# Patient Record
Sex: FEMALE | Race: BLACK OR AFRICAN AMERICAN | NOT HISPANIC OR LATINO | Employment: FULL TIME | URBAN - METROPOLITAN AREA
[De-identification: names, ages, dates, MRNs, and addresses within clinical notes are randomized per-mention and may not be internally consistent; named-entity substitution may affect disease eponyms.]

---

## 2017-11-08 ENCOUNTER — ALLSCRIPTS OFFICE VISIT (OUTPATIENT)
Dept: OTHER | Facility: OTHER | Age: 37
End: 2017-11-08

## 2017-11-08 ENCOUNTER — APPOINTMENT (OUTPATIENT)
Dept: LAB | Facility: CLINIC | Age: 37
End: 2017-11-08
Payer: COMMERCIAL

## 2017-11-08 DIAGNOSIS — Z34.90 ENCOUNTER FOR SUPERVISION OF NORMAL PREGNANCY: ICD-10-CM

## 2017-11-08 LAB — B-HCG SERPL-ACNC: 2156 MIU/ML

## 2017-11-08 PROCEDURE — 84702 CHORIONIC GONADOTROPIN TEST: CPT

## 2017-11-08 PROCEDURE — 36415 COLL VENOUS BLD VENIPUNCTURE: CPT

## 2017-11-09 ENCOUNTER — GENERIC CONVERSION - ENCOUNTER (OUTPATIENT)
Dept: OTHER | Facility: OTHER | Age: 37
End: 2017-11-09

## 2017-11-10 NOTE — PROCEDURES
Assessment    1  Pregnancy, location unknown (V22 2) (Z34 90)    Plan  FamHx: Family history of breast cancer    · Genetics Counselor Referral Co-Management  Genetic Counselor  Consult:Breast Cancer  Status: Hold For - Scheduling,Retrospective By Protocol Authorization Requested for: [id number]   Ordered; For: FamHx: Family history of breast cancer; Ordered By: Davis Pearce Performed:  Due: [id number]; Last Updated By: Nohemy Bundy; 11/8/2017 4:43:26 PM  Care Summary provided  : Yes  Pregnancy, location unknown    · (1) HCG QUANT; Status:Active; Requested ZXZ:[id number];    Perform:[facility]; VCR:[id number]; Ordered;location unknown; Ordered By:Emelina Mercedes;    Discussion/Summary  Discussion Summary: This patient had her  It at the end of August or possibly in the middle of September  she has a history of ectopic pregnancy with left salpingectomy  at todays examination by ultrasound no intrauterine pregnancy was noted  plan is to check an HCG level next patient is aware that there is a possibility of an ectopic pregnancy  Active Problems  1  Migraine (346 90) (G43 909)   2  Pregnancy, location unknown (V22 2) (Z34 90)    Current Meds    1  Dulera 100-5 MCG/ACT Inhalation Aerosol; Therapy: (Recorded:08Nov2017) to Recorded   2  Promethazine-Codeine 6 25-10 MG/5ML Oral Syrup; Therapy: (Recorded:08Nov2017) to Recorded   3  Symbicort 80-4 5 MCG/ACT Inhalation Aerosol; Therapy: (Recorded:08Nov2017) to Recorded    Allergies    1  No Known Drug Allergies   2  No Known Drug Allergies    Results/Data  [id number] Transvaginal Ultrasound OB [facility]:  Procedure: [id number] pregnant uterus real time with image documentation, fetal and maternal evaluation, first trimester (<14 weeks 0 days), transvaginal approach: single or first gestation  -- The study was done today in the office  Indication: EDC gestational age 9 3/8 weeks  Exam indication: amenorrhea     Findings:  Number of gestational sacs and fetuses: no sac  Gestational sac/fetal measurements appropriate for gestation: no sac  Exam of maternal uterus and adnexa: prominent endometrial stripe  Impression: unknown location of pregnancy        Signatures   Electronically signed by : JOVANNY Mayberry ; Nov 8 2017  4:47PM EST                       (Author)

## 2017-11-11 ENCOUNTER — APPOINTMENT (OUTPATIENT)
Dept: LAB | Facility: CLINIC | Age: 37
End: 2017-11-11
Payer: COMMERCIAL

## 2017-11-11 DIAGNOSIS — Z34.90 ENCOUNTER FOR SUPERVISION OF NORMAL PREGNANCY: ICD-10-CM

## 2017-11-11 LAB — B-HCG SERPL-ACNC: 4370 MIU/ML

## 2017-11-11 PROCEDURE — 36415 COLL VENOUS BLD VENIPUNCTURE: CPT

## 2017-11-11 PROCEDURE — 84702 CHORIONIC GONADOTROPIN TEST: CPT

## 2017-11-14 ENCOUNTER — HOSPITAL ENCOUNTER (OUTPATIENT)
Dept: ULTRASOUND IMAGING | Facility: HOSPITAL | Age: 37
Discharge: HOME | End: 2017-11-14
Payer: COMMERCIAL

## 2017-11-14 DIAGNOSIS — Z34.90 ENCOUNTER FOR SUPERVISION OF NORMAL PREGNANCY: ICD-10-CM

## 2017-11-14 PROCEDURE — 76801 OB US < 14 WKS SINGLE FETUS: CPT

## 2017-11-15 ENCOUNTER — GENERIC CONVERSION - ENCOUNTER (OUTPATIENT)
Dept: OTHER | Facility: OTHER | Age: 37
End: 2017-11-15

## 2017-12-14 ENCOUNTER — GENERIC CONVERSION - ENCOUNTER (OUTPATIENT)
Dept: OTHER | Facility: OTHER | Age: 37
End: 2017-12-14

## 2017-12-29 ENCOUNTER — ALLSCRIPTS OFFICE VISIT (OUTPATIENT)
Dept: OTHER | Facility: OTHER | Age: 37
End: 2017-12-29

## 2017-12-29 ENCOUNTER — GENERIC CONVERSION - ENCOUNTER (OUTPATIENT)
Dept: OTHER | Facility: OTHER | Age: 37
End: 2017-12-29

## 2017-12-29 DIAGNOSIS — Z87.59 PERSONAL HISTORY OF OTHER COMPLICATIONS OF PREGNANCY, CHILDBIRTH AND THE PUERPERIUM: ICD-10-CM

## 2017-12-29 DIAGNOSIS — Z34.90 ENCOUNTER FOR SUPERVISION OF NORMAL PREGNANCY: ICD-10-CM

## 2018-01-03 ENCOUNTER — GENERIC CONVERSION - ENCOUNTER (OUTPATIENT)
Dept: OTHER | Facility: OTHER | Age: 38
End: 2018-01-03

## 2018-01-03 PROCEDURE — 87491 CHLMYD TRACH DNA AMP PROBE: CPT | Performed by: OBSTETRICS & GYNECOLOGY

## 2018-01-03 PROCEDURE — G0145 SCR C/V CYTO,THINLAYER,RESCR: HCPCS | Performed by: OBSTETRICS & GYNECOLOGY

## 2018-01-03 PROCEDURE — 87591 N.GONORRHOEAE DNA AMP PROB: CPT | Performed by: OBSTETRICS & GYNECOLOGY

## 2018-01-03 PROCEDURE — 87624 HPV HI-RISK TYP POOLED RSLT: CPT | Performed by: OBSTETRICS & GYNECOLOGY

## 2018-01-04 ENCOUNTER — LAB REQUISITION (OUTPATIENT)
Dept: LAB | Facility: HOSPITAL | Age: 38
End: 2018-01-04
Payer: COMMERCIAL

## 2018-01-04 DIAGNOSIS — Z34.91 ENCOUNTER FOR SUPERVISION OF NORMAL PREGNANCY IN FIRST TRIMESTER: ICD-10-CM

## 2018-01-08 ENCOUNTER — GENERIC CONVERSION - ENCOUNTER (OUTPATIENT)
Dept: OTHER | Facility: OTHER | Age: 38
End: 2018-01-08

## 2018-01-09 ENCOUNTER — HOSPITAL ENCOUNTER (EMERGENCY)
Facility: HOSPITAL | Age: 38
Discharge: HOME | End: 2018-01-09
Attending: EMERGENCY MEDICINE | Admitting: EMERGENCY MEDICINE
Payer: COMMERCIAL

## 2018-01-09 VITALS
OXYGEN SATURATION: 97 % | TEMPERATURE: 98.4 F | WEIGHT: 187 LBS | HEART RATE: 124 BPM | SYSTOLIC BLOOD PRESSURE: 137 MMHG | RESPIRATION RATE: 20 BRPM | DIASTOLIC BLOOD PRESSURE: 77 MMHG

## 2018-01-09 DIAGNOSIS — J45.901 ASTHMA EXACERBATION: ICD-10-CM

## 2018-01-09 DIAGNOSIS — J06.9 UPPER RESPIRATORY INFECTION: Primary | ICD-10-CM

## 2018-01-09 LAB
CHLAMYDIA DNA CVX QL NAA+PROBE: NORMAL
N GONORRHOEA DNA GENITAL QL NAA+PROBE: NORMAL

## 2018-01-09 PROCEDURE — 99284 EMERGENCY DEPT VISIT MOD MDM: CPT

## 2018-01-09 PROCEDURE — 94640 AIRWAY INHALATION TREATMENT: CPT

## 2018-01-09 RX ORDER — ALBUTEROL SULFATE 2.5 MG/3ML
5 SOLUTION RESPIRATORY (INHALATION) ONCE
Status: COMPLETED | OUTPATIENT
Start: 2018-01-09 | End: 2018-01-09

## 2018-01-09 RX ADMIN — ALBUTEROL SULFATE 5 MG: 2.5 SOLUTION RESPIRATORY (INHALATION) at 20:28

## 2018-01-09 RX ADMIN — IPRATROPIUM BROMIDE 0.5 MG: 0.5 SOLUTION RESPIRATORY (INHALATION) at 20:28

## 2018-01-10 LAB — HPV RRNA GENITAL QL NAA+PROBE: NORMAL

## 2018-01-10 NOTE — ED PROVIDER NOTES
History  Chief Complaint   Patient presents with    Shortness of Breath     Pt states she has been having increased SOB for 2 weeks ago with a productive cough  Pt went to PCP and was given Augmentin and Prednisone with no relief  Pt has hx of asthma  Pt has not had any relief with medications at home     Patient presents to the emergency department with a history of asthma with 2 weeks of sob  She has also had URI symptoms and was placed on Augmentin and prednisone by a family physician  She states the medicines do not seem to be working  She denies fever chills or rash  She denies nausea vomiting  She has a nonproductive cough  She also denies diarrhea  She is also approximately 14 weeks pregnant and denies vaginal discharge or bleeding but does state since she has been coughing that she has mild chest discomfort and belly pain with coughing  She denies back pain and also denies trauma fall or injury to her abdomen  She had an ultrasound in mid December which showed a 10 week single intrauterine pregnancy  None       Past Medical History:   Diagnosis Date    Asthma     Meningitis     Migraines        Past Surgical History:   Procedure Laterality Date    TUBAL LIGATION Left        History reviewed  No pertinent family history  I have reviewed and agree with the history as documented  Social History   Substance Use Topics    Smoking status: Never Smoker    Smokeless tobacco: Never Used    Alcohol use No        Review of Systems   Constitutional: Negative  Negative for activity change, appetite change, chills, diaphoresis, fatigue and fever  HENT: Positive for rhinorrhea  Negative for congestion, drooling, sinus pain, sinus pressure, sneezing and trouble swallowing  Eyes: Negative  Negative for photophobia and visual disturbance  Respiratory: Negative  Negative for chest tightness, shortness of breath, wheezing and stridor  Cardiovascular: Positive for chest pain  Negative for palpitations and leg swelling  Gastrointestinal: Positive for abdominal pain  Negative for anal bleeding, blood in stool, constipation, diarrhea, nausea, rectal pain and vomiting  Endocrine: Negative  Genitourinary: Negative  Negative for difficulty urinating, dysuria, flank pain, frequency, hematuria, pelvic pain, urgency, vaginal bleeding, vaginal discharge and vaginal pain  Musculoskeletal: Negative  Negative for back pain, myalgias, neck pain and neck stiffness  Skin: Negative  Negative for rash and wound  Allergic/Immunologic: Negative  Neurological: Negative  Negative for dizziness, tremors, seizures, syncope, facial asymmetry, speech difficulty, weakness, light-headedness, numbness and headaches  Hematological: Negative  Psychiatric/Behavioral: Negative  Physical Exam  ED Triage Vitals   Temperature Pulse Respirations Blood Pressure SpO2   01/09/18 1851 01/09/18 1850 01/09/18 1850 01/09/18 1850 01/09/18 1850   98 4 °F (36 9 °C) (!) 124 20 137/77 97 %      Temp Source Heart Rate Source Patient Position - Orthostatic VS BP Location FiO2 (%)   01/09/18 1851 01/09/18 1850 01/09/18 1850 01/09/18 1850 --   Oral Monitor Sitting Left arm       Pain Score       01/09/18 1850       7           Orthostatic Vital Signs  Vitals:    01/09/18 1850   BP: 137/77   Pulse: (!) 124   Patient Position - Orthostatic VS: Sitting       Physical Exam   Constitutional: She is oriented to person, place, and time  She appears well-developed and well-nourished  Nontoxic appearance without obvious respiratory distress  Patient looks comfortable sitting upright in the stretcher  HENT:   Head: Normocephalic and atraumatic  Right Ear: External ear normal    Left Ear: External ear normal    Mouth/Throat: Oropharynx is clear and moist    Eyes: Conjunctivae and EOM are normal  Pupils are equal, round, and reactive to light  Neck: Normal range of motion  Neck supple     Cardiovascular: Normal rate, regular rhythm, normal heart sounds and intact distal pulses  Pulmonary/Chest: Effort normal  No respiratory distress  She has wheezes  She has no rales  She exhibits no tenderness  Abdominal: Soft  Bowel sounds are normal  She exhibits no distension and no mass  There is tenderness  There is no rebound and no guarding  No hernia  Mild generalized abdominal tenderness but no peritoneal signs   Musculoskeletal: Normal range of motion  She exhibits no edema, tenderness or deformity  Neurological: She is alert and oriented to person, place, and time  She has normal reflexes  She displays normal reflexes  No cranial nerve deficit or sensory deficit  She exhibits normal muscle tone  Coordination normal    Skin: Skin is warm and dry  No rash noted  No erythema  No pallor  Psychiatric: She has a normal mood and affect  Her behavior is normal  Judgment and thought content normal    Nursing note and vitals reviewed  ED Medications  Medications   albuterol inhalation solution 5 mg (5 mg Nebulization Given 1/9/18 2028)   ipratropium (ATROVENT) 0 02 % inhalation solution 0 5 mg (0 5 mg Nebulization Given 1/9/18 2028)       Diagnostic Studies  Results Reviewed     None                 No orders to display              Procedures  Procedures       Phone Contacts  ED Phone Contact    ED Course  ED Course as of Jan 09 2057   Tue Jan 09, 2018 2047 Patient is stable forDischarge  She feels significantly improved after the albuterol treatment  I discussed signs and symptoms that would require return to the emergency department                                  MDM  CritCare Time    Disposition  Final diagnoses:   Upper respiratory infection   Asthma exacerbation     Time reflects when diagnosis was documented in both MDM as applicable and the Disposition within this note     Time User Action Codes Description Comment    1/9/2018  8:47 PM Guillermina Haley [J06 9] Upper respiratory infection     1/9/2018  8:47 Myra Stewart Add [J45 901] Asthma exacerbation       ED Disposition     ED Disposition Condition Comment    Discharge  Erinn Gunderson discharge to home/self care  Condition at discharge: Stable        Follow-up Information     Follow up With Specialties Details Why Contact Info    Private physicians  Schedule an appointment as soon as possible for a visit          Patient's Medications   Discharge Prescriptions    IPRATROPIUM (ATROVENT) 0 02 % NEBULIZER SOLUTION    Take 2 5 mL by nebulization 4 (four) times a day       Start Date: 1/9/2018  End Date: --       Order Dose: 0 5 mg       Quantity: 75 mL    Refills: 0     No discharge procedures on file      ED Provider  Electronically Signed by           Nallely Marquez MD  01/09/18 9681       Nallely Marquez MD  01/09/18 1523

## 2018-01-10 NOTE — DISCHARGE INSTRUCTIONS
Asthma, Ambulatory Care   GENERAL INFORMATION:   Asthma  is a lung disease that makes breathing difficult  Chronic inflammation and reactions to triggers narrow the airways in your lungs  Asthma can become life-threatening if it is not managed  Common symptoms include the following:   · Coughing     · Wheezing     · Shortness of breath     · Chest tightness  Seek immediate care for the following symptoms:   · Severe shortness of breath    · Blue or gray lips or nails    · Skin around your neck and ribs pulls in with each breath    · Shortness of breath, even after you take your short-term medicine as directed     · Peak flow numbers in the red zone of your asthma action plan  Treatment for asthma  will depend on how severe it is  Medicine may decrease inflammation, open airways, and make it easier to breathe  Medicines may be inhaled, taken as a pill, or injected  Short-term medicines relieve your symptoms quickly  Long-term medicines are used to prevent future attacks  You may also need medicine to help control your allergies  Manage and prevent future asthma attacks:   · Follow your asthma action pan  This is a written plan that you and your healthcare provider create  It explains which medicine you need and when to change doses if necessary  It also explains how you can monitor symptoms and use a peak flow meter  The meter measures how well your lungs are working  · Manage other health conditions , such as allergies, acid reflux, and sleep apnea  · Identify and avoid triggers  These may include pets, dust mites, mold, and cockroaches  · Do not smoke and avoid others who smoke  If you smoke, it is never too late to quit  Ask your healthcare provider if you need help quitting  · Ask about a flu vaccine  The flu can make your asthma worse  You may need a yearly flu shot  Follow up with your healthcare provider as directed:   You will need to return to make sure your medicine is working and your symptoms are controlled  You may be referred to an asthma or allergy specialist  Mleecio Haider may be asked to keep a record of your peak flow values and bring it with you to your appointments  Write down your questions so you remember to ask them during your visits  CARE AGREEMENT:   You have the right to help plan your care  Learn about your health condition and how it may be treated  Discuss treatment options with your caregivers to decide what care you want to receive  You always have the right to refuse treatment  The above information is an  only  It is not intended as medical advice for individual conditions or treatments  Talk to your doctor, nurse or pharmacist before following any medical regimen to see if it is safe and effective for you  © 2014 [address] is for End User's use only and may not be sold, redistributed or otherwise used for commercial purposes  All illustrations and images included in CareNotes® are the copyrighted property of [employer] D [employer] M [employer]  or Anatoly Varma  Upper Respiratory Infection, Ambulatory Care   GENERAL INFORMATION:   An upper respiratory infection  is also called a common cold  It can affect your nose, throat, ears, and sinuses  Common symptoms include the following:   · Runny or stuffy nose    · Sneezing and coughing    · Sore throat or hoarseness    · Red, watery, and sore eyes    · Tiredness or restlessness    · Chills and fever    · Headache, body aches, or sore muscles  Seek immediate care for the following symptoms:   · Headaches or a stiff neck    · Bright lights hurt your eyes    · Chest pain or trouble breathing  Treatment for an upper respiratory infection  may include any of the following:  · Decongestants  help decrease nasal congestion and improve your breathing  Do not use decongestant sprays for more than a few days  · Cough suppressants  help decrease coughing   Ask your healthcare provider which type of cough medicine is best for you  Some cough medicines need a doctor's order  · NSAIDs  help decrease swelling and pain or fever  This medicine is available with or without a doctor's order  NSAIDs can cause stomach bleeding or kidney problems in certain people  If you take blood thinner medicine, always ask your healthcare provider if NSAIDs are safe for you  Always read the medicine label and follow directions  Care for an upper respiratory infection:   · Rest  until your fever is gone or you feel better  · Drink liquids as directed to prevent dehydration  You may need to drink 8 to 10 cups of liquid each day  Good liquids to drink include water, ginger ale, tea, or fruit juices  · Gargle  with warm salt water to help your sore throat feel better  Mix ¼ teaspoon salt with 1 cup warm water  You may also suck on hard candy or throat lozenges  · Saline nasal drops  help loosen your nasal congestion  They can be bought without a doctor's order  · Take a warm bath or shower  to help decrease body aches and help you breathe easier  · Use a cool-mist humidifier  to increase air moisture and make it easier for you to breathe  Prevent the spread of germs:   · Avoid others for the first 2 to 3 days of your cold  Germs are easily spread during this time  · Do not share food, drinks,  towels, or personal items with others  · Wash your hands often  Use soap and water  Wash your hands after you use the bathroom, change a child's diapers, or sneeze  Wash your hands before you prepare or eat food  Cover your mouth and nose with a tissue when you sneeze or cough  Follow up with your healthcare provider as directed:  Write down your questions so you remember to ask them during your visits  CARE AGREEMENT:   You have the right to help plan your care  Learn about your health condition and how it may be treated  Discuss treatment options with your caregivers to decide what care you want to receive   You always have the right to refuse treatment  The above information is an  only  It is not intended as medical advice for individual conditions or treatments  Talk to your doctor, nurse or pharmacist before following any medical regimen to see if it is safe and effective for you  © 2014 [address] is for End User's use only and may not be sold, redistributed or otherwise used for commercial purposes  All illustrations and images included in CareNotes® are the copyrighted property of A [employer] M [employer] Inc  or Anatoly Varma

## 2018-01-10 NOTE — ED NOTES
D/c instructions reviewed with pt, pt verbalized understanding and has no further questions at this time       Claudetta Allen, RN  01/09/18 8471

## 2018-01-10 NOTE — ED NOTES
D/c and medication reviewed with pt  Pt verbalized understanding and has no further questions or complaints at this time  Pt ambulatory off unit with steady gait       Carmen Araujo RN  01/09/18 0731

## 2018-01-11 LAB
LAB AP GYN PRIMARY INTERPRETATION: NORMAL
Lab: NORMAL
PATH INTERP SPEC-IMP: NORMAL

## 2018-01-13 NOTE — MISCELLANEOUS
Message   Recorded as Task   Date: 11/08/2017 04:47 PM, Created By: Trisha Dose   Task Name: Review Note   Assigned To: Jorge Gayle   Regarding Patient: Allyson Redmond, Status: In Progress   FranAnder Osgood - 08 Nov 2017 4:47 PM     TASK CREATED  Eve Sanders - 08 Nov 2017 4:56 PM     TASK IN PROGRESS   Andrew Boateng - 08 Nov 2017 4:57 PM     TASK EDITED  Unknown location of patients pregnancy  She has hx of Ectopic  Will go for HCG  Shira,Jan - 09 Nov 2017 10:01 AM     TASK EDITED  pts' quant from 11/8/17 is 2156  Dr Manasa Roblero notified  Active Problems    1  Migraine (346 90) (G43 909)   2  Pregnancy, location unknown (V22 2) (Z34 90)    Current Meds   1  Dulera 100-5 MCG/ACT Inhalation Aerosol; Therapy: (Recorded:08Nov2017) to Recorded   2  Promethazine-Codeine 6 25-10 MG/5ML Oral Syrup; Therapy: (Recorded:08Nov2017) to Recorded   3  Symbicort 80-4 5 MCG/ACT Inhalation Aerosol; Therapy: (Recorded:08Nov2017) to Recorded    Allergies    1  No Known Drug Allergies   2   No Known Drug Allergies    Signatures   Electronically signed by : Stefany Stewart RN; Nov 9 2017 10:02AM EST                       (Author)

## 2018-01-14 VITALS
DIASTOLIC BLOOD PRESSURE: 74 MMHG | SYSTOLIC BLOOD PRESSURE: 126 MMHG | HEIGHT: 66 IN | WEIGHT: 184 LBS | BODY MASS INDEX: 29.57 KG/M2

## 2018-01-15 NOTE — MISCELLANEOUS
Message  per dr Jessa Espinal to go for repeat HCG sat 11/11/17 -ordered entered into [employer]  called pt, advised to report to [facility] this sat prior to 12 noon  pt verbalized understanding  Active Problems    1  Migraine (346 90) (G43 909)   2  Pregnancy, location unknown (V22 2) (Z34 90)    Current Meds   1  Dulera 100-5 MCG/ACT Inhalation Aerosol; Therapy: (Recorded:08Nov2017) to Recorded   2  Promethazine-Codeine 6 25-10 MG/5ML Oral Syrup; Therapy: (Recorded:08Nov2017) to Recorded   3  Symbicort 80-4 5 MCG/ACT Inhalation Aerosol; Therapy: (Recorded:08Nov2017) to Recorded    Allergies    1  No Known Drug Allergies   2   No Known Drug Allergies    Signatures   Electronically signed by : Gil Nuno RN; Nov 9 2017  1:02PM EST                       (Author)

## 2018-01-23 NOTE — MISCELLANEOUS
Message  Per patient request, faxed dental letter to her place of employment at [phone]  Active Problems    1  Amenorrhea (626 0) (N91 2)   2  Asthma (493 90) (J90 689)   3  Encounter for supervision of normal pregnancy in first trimester (V22 1) (Z34 91)   4  Migraine (346 90) (D73 049)   5  PCOS (polycystic ovarian syndrome) (256 4) (E28 2)   6  Pregnancy, location unknown (V22 2) (Z34 90)   7  Pregnancy, obstetrical care (V22 1) (Z34 90)   8  Yeast vaginitis (112 1) (Q27 3)    Current Meds   1  Aspirin 81 MG TABS; Therapy: (AQWFRTDI:[id number]) to Recorded   2  Augmentin TABS (Amoxicillin-Pot Clavulanate); Therapy: (Recorded:56Qoq8096) to Recorded   3  Dulera 100-5 MCG/ACT Inhalation Aerosol; Therapy: (9577 9424) to Recorded   4  PredniSONE 10 MG Oral Tablet; Therapy: 09MQQ3865 to Recorded   5  Prenate DHA 28-0 6-0 4-300 MG Oral Capsule; Therapy: 40JIU2325 to Recorded   6  Spiriva HandiHaler 18 MCG Inhalation Capsule; Therapy: (Keisha Ringer) to Recorded   7  Terconazole 0 8 % Vaginal Cream; INSERT 1 APPLICATORFUL INTRAVAGINALLY AT   BEDTIME; Therapy: 84OAW2428 to (Evaluate:06Jan2018)  Requested for: 11SWS6093; Last   Rx:03Jan2018 Ordered   8  Tessalon Perles 100 MG Oral Capsule (Benzonatate); Therapy: (Keisha Ringer) to Recorded   9  Ventolin 90 MCG/ACT AERS (Albuterol); Therapy: (Recorded:08Hnv1131) to Recorded    Allergies    1  No Known Drug Allergies   2   No Known Drug Allergies    Signatures   Electronically signed by : Tena Ward, ; Jan 8 2018  3:45PM EST                       (Author)

## 2018-01-24 VITALS
WEIGHT: 193 LBS | HEIGHT: 66 IN | SYSTOLIC BLOOD PRESSURE: 128 MMHG | BODY MASS INDEX: 31.02 KG/M2 | HEIGHT: 66 IN | DIASTOLIC BLOOD PRESSURE: 76 MMHG | SYSTOLIC BLOOD PRESSURE: 126 MMHG | WEIGHT: 192 LBS | BODY MASS INDEX: 30.86 KG/M2 | DIASTOLIC BLOOD PRESSURE: 84 MMHG

## 2018-01-24 VITALS
SYSTOLIC BLOOD PRESSURE: 130 MMHG | BODY MASS INDEX: 30.7 KG/M2 | WEIGHT: 191 LBS | DIASTOLIC BLOOD PRESSURE: 90 MMHG | HEIGHT: 66 IN

## 2018-01-31 ENCOUNTER — ROUTINE PRENATAL (OUTPATIENT)
Dept: OBGYN CLINIC | Facility: MEDICAL CENTER | Age: 38
End: 2018-01-31

## 2018-01-31 VITALS — BODY MASS INDEX: 30.67 KG/M2 | WEIGHT: 190 LBS | DIASTOLIC BLOOD PRESSURE: 78 MMHG | SYSTOLIC BLOOD PRESSURE: 120 MMHG

## 2018-01-31 DIAGNOSIS — Z87.59 HISTORY OF ECTOPIC PREGNANCY: ICD-10-CM

## 2018-01-31 DIAGNOSIS — J45.909 MODERATE ASTHMA, UNSPECIFIED WHETHER COMPLICATED, UNSPECIFIED WHETHER PERSISTENT: ICD-10-CM

## 2018-01-31 DIAGNOSIS — O09.522 ADVANCED MATERNAL AGE IN MULTIGRAVIDA, SECOND TRIMESTER: ICD-10-CM

## 2018-01-31 DIAGNOSIS — Z34.82 ENCOUNTER FOR SUPERVISION OF NORMAL PREGNANCY IN MULTIGRAVIDA IN SECOND TRIMESTER: Primary | ICD-10-CM

## 2018-01-31 DIAGNOSIS — O09.292 HISTORY OF PRE-ECLAMPSIA IN PRIOR PREGNANCY, CURRENTLY PREGNANT IN SECOND TRIMESTER: ICD-10-CM

## 2018-01-31 DIAGNOSIS — O09.892 HISTORY OF PRETERM DELIVERY, CURRENTLY PREGNANT IN SECOND TRIMESTER: ICD-10-CM

## 2018-01-31 PROBLEM — G43.909 MIGRAINE: Status: ACTIVE | Noted: 2017-11-08

## 2018-01-31 PROBLEM — E28.2 PCOS (POLYCYSTIC OVARIAN SYNDROME): Status: ACTIVE | Noted: 2018-01-03

## 2018-01-31 PROCEDURE — PNV: Performed by: NURSE PRACTITIONER

## 2018-01-31 RX ORDER — ALBUTEROL SULFATE 90 UG/1
1 AEROSOL, METERED RESPIRATORY (INHALATION) EVERY 6 HOURS
COMMUNITY
Start: 2017-12-19 | End: 2018-06-08 | Stop reason: HOSPADM

## 2018-01-31 RX ORDER — PREDNISONE 10 MG/1
TABLET ORAL
Refills: 0 | Status: ON HOLD | COMMUNITY
Start: 2018-01-09 | End: 2018-06-04 | Stop reason: DRUGHIGH

## 2018-01-31 RX ORDER — MONTELUKAST SODIUM 10 MG/1
TABLET ORAL
COMMUNITY
Start: 2017-07-18 | End: 2018-06-08 | Stop reason: HOSPADM

## 2018-01-31 RX ORDER — ALBUTEROL SULFATE 2.5 MG/3ML
SOLUTION RESPIRATORY (INHALATION) EVERY 6 HOURS PRN
COMMUNITY
Start: 2017-07-18

## 2018-01-31 RX ORDER — ALBUTEROL SULFATE 90 UG/1
AEROSOL, METERED RESPIRATORY (INHALATION)
COMMUNITY
End: 2018-06-08 | Stop reason: HOSPADM

## 2018-01-31 NOTE — ASSESSMENT & PLAN NOTE
Denies OB complaints  Not yet aware of FM - reassurance provided  Denies ctx/LOF/VB  Encouraged to complete prenatal and baseline preE labs at her earliest convenience; also encouraged to schedule MFM consult and L2 - she agrees to do so  Declines flu vaccine  Reviewed sx to report

## 2018-01-31 NOTE — ASSESSMENT & PLAN NOTE
Reviewed risks of AMA  The patient has been counseled on aneuploidy screening options at prior visit and declines

## 2018-01-31 NOTE — ASSESSMENT & PLAN NOTE
H/o preE with preg #1 in 2003  Baseline labs ordered and she will complete  Reviewed sx of preE and reasons to call

## 2018-01-31 NOTE — ASSESSMENT & PLAN NOTE
The patient describe mod disease with frequent exacerbations and URI  Followed by Pulm  She reports she has rescue inhaler with refills   She DECLINES flu vaccine - reports this has caused asthma exacerbations in the past

## 2018-01-31 NOTE — PROGRESS NOTES
Problem List Items Addressed This Visit     Asthma     The patient describe mod disease with frequent exacerbations and URI  Followed by Pulm  She reports she has rescue inhaler with refills  She DECLINES flu vaccine - reports this has caused asthma exacerbations in the past           Relevant Medications    albuterol (2 5 mg/3 mL) 0 083 % nebulizer solution    albuterol (PROVENTIL HFA,VENTOLIN HFA) 90 mcg/act inhaler    Mometasone Furo-Formoterol Fum (DULERA) 100-5 MCG/ACT AERO    montelukast (SINGULAIR) 10 mg tablet    albuterol (VENTOLIN HFA) 90 mcg/act inhaler    Encounter for supervision of normal pregnancy in multigravida in second trimester - Primary     Denies OB complaints  Not yet aware of FM - reassurance provided  Denies ctx/LOF/VB  Encouraged to complete prenatal and baseline preE labs at her earliest convenience; also encouraged to schedule MFM consult and L2 - she agrees to do so  Declines flu vaccine  Reviewed sx to report  History of pre-eclampsia in prior pregnancy, currently pregnant in second trimester     H/o preE with preg #1 in   Baseline labs ordered and she will complete  Reviewed sx of preE and reasons to call  History of  delivery, currently pregnant in second trimester     H/o PROM ->  at 36w with preg #6 in 2016  Pt reports no h/o infection or preceding PTL  MFM consult encouraged  Advised low threshold for calling with complaints  Advanced maternal age in multigravida, second trimester     Reviewed risks of AMA  The patient has been counseled on aneuploidy screening options at prior visit and declines           History of ectopic pregnancy

## 2018-01-31 NOTE — PROGRESS NOTES
I have reviewed the notes, assessments, and/or procedures performed by Samreen Boss, I concur with her/his documentation of Nita Pham

## 2018-01-31 NOTE — ASSESSMENT & PLAN NOTE
H/o PROM ->  at 36w with preg #6 in 2016  Pt reports no h/o infection or preceding PTL  MFM consult encouraged  Advised low threshold for calling with complaints

## 2018-03-07 NOTE — PROGRESS NOTES
Education  Baby & Me Education 1st Trimester:   First Trimester Education provided:   benefits of breastfeeding, importance of exclusive breastfeeding, early initiation of breastfeeding, exclusive breastfeeding for the first 6 months and Pregnancy Essentials Reference Guide given   The patient is planning on breastfeeding  Prenatal education provided by: Nina Underwood MA      Signatures   Electronically signed by :  Ollie Bridges, ; Dec 29 2017  1:57PM EST                       (Co-author)

## 2018-06-04 ENCOUNTER — HOSPITAL ENCOUNTER (EMERGENCY)
Facility: HOSPITAL | Age: 38
End: 2018-06-04
Attending: EMERGENCY MEDICINE | Admitting: EMERGENCY MEDICINE
Payer: MEDICAID

## 2018-06-04 ENCOUNTER — HOSPITAL ENCOUNTER (INPATIENT)
Facility: HOSPITAL | Age: 38
LOS: 4 days | Discharge: HOME | DRG: 566 | End: 2018-06-08
Attending: OBSTETRICS & GYNECOLOGY | Admitting: OBSTETRICS & GYNECOLOGY
Payer: MEDICAID

## 2018-06-04 VITALS
WEIGHT: 206.79 LBS | OXYGEN SATURATION: 95 % | SYSTOLIC BLOOD PRESSURE: 143 MMHG | RESPIRATION RATE: 26 BRPM | TEMPERATURE: 98.4 F | HEART RATE: 140 BPM | BODY MASS INDEX: 33.38 KG/M2 | DIASTOLIC BLOOD PRESSURE: 81 MMHG

## 2018-06-04 DIAGNOSIS — J45.909 ASTHMA AFFECTING PREGNANCY, ANTEPARTUM: Primary | ICD-10-CM

## 2018-06-04 DIAGNOSIS — J45.51 SEVERE PERSISTENT ASTHMA WITH ACUTE EXACERBATION: ICD-10-CM

## 2018-06-04 DIAGNOSIS — D64.9 ANEMIA: ICD-10-CM

## 2018-06-04 DIAGNOSIS — J45.909 ASTHMA, UNSPECIFIED ASTHMA SEVERITY, UNSPECIFIED WHETHER COMPLICATED, UNSPECIFIED WHETHER PERSISTENT: Primary | ICD-10-CM

## 2018-06-04 DIAGNOSIS — O99.519 ASTHMA AFFECTING PREGNANCY, ANTEPARTUM: Primary | ICD-10-CM

## 2018-06-04 DIAGNOSIS — K21.9 GERD (GASTROESOPHAGEAL REFLUX DISEASE): ICD-10-CM

## 2018-06-04 DIAGNOSIS — J30.2 SEASONAL ALLERGIC RHINITIS: ICD-10-CM

## 2018-06-04 LAB
ABO GROUP BLD: NORMAL
ANION GAP SERPL CALCULATED.3IONS-SCNC: 14 MMOL/L (ref 4–13)
BASOPHILS # BLD AUTO: 0.02 THOUSANDS/ΜL (ref 0–0.1)
BASOPHILS NFR BLD AUTO: 0 % (ref 0–1)
BLD GP AB SCN SERPL QL: NEGATIVE
BUN SERPL-MCNC: 4 MG/DL (ref 5–25)
CALCIUM SERPL-MCNC: 9 MG/DL (ref 8.3–10.1)
CHLORIDE SERPL-SCNC: 106 MMOL/L (ref 100–108)
CO2 SERPL-SCNC: 21 MMOL/L (ref 21–32)
CREAT SERPL-MCNC: 0.81 MG/DL (ref 0.6–1.3)
EOSINOPHIL # BLD AUTO: 0.02 THOUSAND/ΜL (ref 0–0.61)
EOSINOPHIL NFR BLD AUTO: 0 % (ref 0–6)
ERYTHROCYTE [DISTWIDTH] IN BLOOD BY AUTOMATED COUNT: 13.8 % (ref 11.6–15.1)
GFR SERPL CREATININE-BSD FRML MDRD: 108 ML/MIN/1.73SQ M
GLUCOSE SERPL-MCNC: 89 MG/DL (ref 65–140)
HCT VFR BLD AUTO: 30.2 % (ref 34.8–46.1)
HGB BLD-MCNC: 9.7 G/DL (ref 11.5–15.4)
IMM GRANULOCYTES # BLD AUTO: 0.07 THOUSAND/UL (ref 0–0.2)
IMM GRANULOCYTES NFR BLD AUTO: 1 % (ref 0–2)
LYMPHOCYTES # BLD AUTO: 1.8 THOUSANDS/ΜL (ref 0.6–4.47)
LYMPHOCYTES NFR BLD AUTO: 15 % (ref 14–44)
MCH RBC QN AUTO: 27 PG (ref 26.8–34.3)
MCHC RBC AUTO-ENTMCNC: 32.1 G/DL (ref 31.4–37.4)
MCV RBC AUTO: 84 FL (ref 82–98)
MONOCYTES # BLD AUTO: 0.91 THOUSAND/ΜL (ref 0.17–1.22)
MONOCYTES NFR BLD AUTO: 8 % (ref 4–12)
NEUTROPHILS # BLD AUTO: 9.24 THOUSANDS/ΜL (ref 1.85–7.62)
NEUTS SEG NFR BLD AUTO: 76 % (ref 43–75)
NRBC BLD AUTO-RTO: 0 /100 WBCS
PLATELET # BLD AUTO: 222 THOUSANDS/UL (ref 149–390)
PMV BLD AUTO: 10.9 FL (ref 8.9–12.7)
POTASSIUM SERPL-SCNC: 3 MMOL/L (ref 3.5–5.3)
RBC # BLD AUTO: 3.59 MILLION/UL (ref 3.81–5.12)
RH BLD: POSITIVE
SODIUM SERPL-SCNC: 141 MMOL/L (ref 136–145)
SPECIMEN EXPIRATION DATE: NORMAL
TROPONIN I SERPL-MCNC: <0.02 NG/ML
WBC # BLD AUTO: 12.06 THOUSAND/UL (ref 4.31–10.16)

## 2018-06-04 PROCEDURE — 99254 IP/OBS CNSLTJ NEW/EST MOD 60: CPT | Performed by: INTERNAL MEDICINE

## 2018-06-04 PROCEDURE — 86900 BLOOD TYPING SEROLOGIC ABO: CPT | Performed by: OBSTETRICS & GYNECOLOGY

## 2018-06-04 PROCEDURE — 86850 RBC ANTIBODY SCREEN: CPT | Performed by: OBSTETRICS & GYNECOLOGY

## 2018-06-04 PROCEDURE — 80048 BASIC METABOLIC PNL TOTAL CA: CPT | Performed by: EMERGENCY MEDICINE

## 2018-06-04 PROCEDURE — 99285 EMERGENCY DEPT VISIT HI MDM: CPT

## 2018-06-04 PROCEDURE — 86901 BLOOD TYPING SEROLOGIC RH(D): CPT | Performed by: OBSTETRICS & GYNECOLOGY

## 2018-06-04 PROCEDURE — 99232 SBSQ HOSP IP/OBS MODERATE 35: CPT | Performed by: OBSTETRICS & GYNECOLOGY

## 2018-06-04 PROCEDURE — 94640 AIRWAY INHALATION TREATMENT: CPT

## 2018-06-04 PROCEDURE — 96375 TX/PRO/DX INJ NEW DRUG ADDON: CPT

## 2018-06-04 PROCEDURE — 96365 THER/PROPH/DIAG IV INF INIT: CPT

## 2018-06-04 PROCEDURE — 84484 ASSAY OF TROPONIN QUANT: CPT | Performed by: EMERGENCY MEDICINE

## 2018-06-04 PROCEDURE — 4A1HXCZ MONITORING OF PRODUCTS OF CONCEPTION, CARDIAC RATE, EXTERNAL APPROACH: ICD-10-PCS | Performed by: OBSTETRICS & GYNECOLOGY

## 2018-06-04 PROCEDURE — 94760 N-INVAS EAR/PLS OXIMETRY 1: CPT

## 2018-06-04 PROCEDURE — 93005 ELECTROCARDIOGRAM TRACING: CPT

## 2018-06-04 PROCEDURE — 36415 COLL VENOUS BLD VENIPUNCTURE: CPT | Performed by: EMERGENCY MEDICINE

## 2018-06-04 PROCEDURE — 85025 COMPLETE CBC W/AUTO DIFF WBC: CPT | Performed by: EMERGENCY MEDICINE

## 2018-06-04 RX ORDER — ALBUTEROL SULFATE 2.5 MG/3ML
2.5 SOLUTION RESPIRATORY (INHALATION) EVERY 4 HOURS PRN
Status: DISCONTINUED | OUTPATIENT
Start: 2018-06-04 | End: 2018-06-04

## 2018-06-04 RX ORDER — ALBUTEROL SULFATE 90 UG/1
2 AEROSOL, METERED RESPIRATORY (INHALATION) EVERY 6 HOURS PRN
COMMUNITY
Start: 2017-07-07 | End: 2018-06-08 | Stop reason: HOSPADM

## 2018-06-04 RX ORDER — SODIUM CHLORIDE, SODIUM LACTATE, POTASSIUM CHLORIDE, CALCIUM CHLORIDE 600; 310; 30; 20 MG/100ML; MG/100ML; MG/100ML; MG/100ML
125 INJECTION, SOLUTION INTRAVENOUS CONTINUOUS
Status: DISCONTINUED | OUTPATIENT
Start: 2018-06-04 | End: 2018-06-04

## 2018-06-04 RX ORDER — POTASSIUM CHLORIDE 20 MEQ/1
40 TABLET, EXTENDED RELEASE ORAL ONCE
Status: COMPLETED | OUTPATIENT
Start: 2018-06-04 | End: 2018-06-04

## 2018-06-04 RX ORDER — DOCUSATE SODIUM 100 MG/1
100 CAPSULE, LIQUID FILLED ORAL 2 TIMES DAILY
Status: DISCONTINUED | OUTPATIENT
Start: 2018-06-04 | End: 2018-06-08 | Stop reason: HOSPADM

## 2018-06-04 RX ORDER — ALBUTEROL SULFATE 2.5 MG/3ML
5 SOLUTION RESPIRATORY (INHALATION) ONCE
Status: COMPLETED | OUTPATIENT
Start: 2018-06-04 | End: 2018-06-04

## 2018-06-04 RX ORDER — LORATADINE 10 MG/1
10 TABLET ORAL DAILY
Status: DISCONTINUED | OUTPATIENT
Start: 2018-06-04 | End: 2018-06-08 | Stop reason: HOSPADM

## 2018-06-04 RX ORDER — MAGNESIUM SULFATE HEPTAHYDRATE 40 MG/ML
2 INJECTION, SOLUTION INTRAVENOUS ONCE
Status: COMPLETED | OUTPATIENT
Start: 2018-06-04 | End: 2018-06-04

## 2018-06-04 RX ORDER — BUDESONIDE AND FORMOTEROL FUMARATE DIHYDRATE 160; 4.5 UG/1; UG/1
2 AEROSOL RESPIRATORY (INHALATION) 2 TIMES DAILY
Status: DISCONTINUED | OUTPATIENT
Start: 2018-06-04 | End: 2018-06-04

## 2018-06-04 RX ORDER — 0.9 % SODIUM CHLORIDE 0.9 %
3 VIAL (ML) INJECTION AS NEEDED
Status: DISCONTINUED | OUTPATIENT
Start: 2018-06-04 | End: 2018-06-04 | Stop reason: HOSPADM

## 2018-06-04 RX ORDER — CALCIUM CARBONATE 200(500)MG
1000 TABLET,CHEWABLE ORAL 3 TIMES DAILY PRN
Status: DISCONTINUED | OUTPATIENT
Start: 2018-06-04 | End: 2018-06-08 | Stop reason: HOSPADM

## 2018-06-04 RX ORDER — PREDNISONE 20 MG/1
20 TABLET ORAL DAILY
Status: DISCONTINUED | OUTPATIENT
Start: 2018-06-04 | End: 2018-06-04

## 2018-06-04 RX ORDER — FERROUS SULFATE 325(65) MG
325 TABLET ORAL
Status: DISCONTINUED | OUTPATIENT
Start: 2018-06-04 | End: 2018-06-08 | Stop reason: HOSPADM

## 2018-06-04 RX ORDER — METHYLPREDNISOLONE SODIUM SUCCINATE 40 MG/ML
40 INJECTION, POWDER, LYOPHILIZED, FOR SOLUTION INTRAMUSCULAR; INTRAVENOUS EVERY 8 HOURS SCHEDULED
Status: DISCONTINUED | OUTPATIENT
Start: 2018-06-04 | End: 2018-06-07

## 2018-06-04 RX ORDER — BUDESONIDE 0.5 MG/2ML
0.5 INHALANT ORAL
Status: DISCONTINUED | OUTPATIENT
Start: 2018-06-04 | End: 2018-06-07

## 2018-06-04 RX ORDER — METHYLPREDNISOLONE SODIUM SUCCINATE 125 MG/2ML
125 INJECTION, POWDER, LYOPHILIZED, FOR SOLUTION INTRAMUSCULAR; INTRAVENOUS ONCE
Status: COMPLETED | OUTPATIENT
Start: 2018-06-04 | End: 2018-06-04

## 2018-06-04 RX ORDER — ACETAMINOPHEN 500 MG
1000 TABLET ORAL AS NEEDED
COMMUNITY
End: 2019-03-26

## 2018-06-04 RX ORDER — ALBUTEROL SULFATE 2.5 MG/3ML
5 SOLUTION RESPIRATORY (INHALATION) EVERY 4 HOURS PRN
Status: DISCONTINUED | OUTPATIENT
Start: 2018-06-04 | End: 2018-06-07

## 2018-06-04 RX ORDER — ACETAMINOPHEN 325 MG/1
650 TABLET ORAL EVERY 6 HOURS PRN
Status: DISCONTINUED | OUTPATIENT
Start: 2018-06-04 | End: 2018-06-08 | Stop reason: HOSPADM

## 2018-06-04 RX ORDER — LEVALBUTEROL 1.25 MG/.5ML
1.25 SOLUTION, CONCENTRATE RESPIRATORY (INHALATION)
Status: DISCONTINUED | OUTPATIENT
Start: 2018-06-04 | End: 2018-06-07

## 2018-06-04 RX ORDER — ONDANSETRON 2 MG/ML
4 INJECTION INTRAMUSCULAR; INTRAVENOUS EVERY 6 HOURS PRN
Status: DISCONTINUED | OUTPATIENT
Start: 2018-06-04 | End: 2018-06-08 | Stop reason: HOSPADM

## 2018-06-04 RX ORDER — SODIUM CHLORIDE, SODIUM LACTATE, POTASSIUM CHLORIDE, CALCIUM CHLORIDE 600; 310; 30; 20 MG/100ML; MG/100ML; MG/100ML; MG/100ML
125 INJECTION, SOLUTION INTRAVENOUS CONTINUOUS
Status: DISCONTINUED | OUTPATIENT
Start: 2018-06-04 | End: 2018-06-05

## 2018-06-04 RX ORDER — SODIUM CHLORIDE FOR INHALATION 0.9 %
3 VIAL, NEBULIZER (ML) INHALATION
Status: DISCONTINUED | OUTPATIENT
Start: 2018-06-04 | End: 2018-06-07

## 2018-06-04 RX ORDER — POTASSIUM CHLORIDE 20 MEQ/1
40 TABLET, EXTENDED RELEASE ORAL ONCE
Status: COMPLETED | OUTPATIENT
Start: 2018-06-05 | End: 2018-06-05

## 2018-06-04 RX ORDER — PREDNISONE 20 MG/1
20 TABLET ORAL DAILY
COMMUNITY
End: 2018-06-08 | Stop reason: HOSPADM

## 2018-06-04 RX ADMIN — ALBUTEROL SULFATE 5 MG: 2.5 SOLUTION RESPIRATORY (INHALATION) at 07:31

## 2018-06-04 RX ADMIN — ALBUTEROL SULFATE 5 MG: 2.5 SOLUTION RESPIRATORY (INHALATION) at 05:46

## 2018-06-04 RX ADMIN — SODIUM CHLORIDE, SODIUM LACTATE, POTASSIUM CHLORIDE, AND CALCIUM CHLORIDE 125 ML/HR: .6; .31; .03; .02 INJECTION, SOLUTION INTRAVENOUS at 13:46

## 2018-06-04 RX ADMIN — FERROUS SULFATE TAB 325 MG (65 MG ELEMENTAL FE) 325 MG: 325 (65 FE) TAB at 16:56

## 2018-06-04 RX ADMIN — LEVALBUTEROL HYDROCHLORIDE 1.25 MG: 1.25 SOLUTION, CONCENTRATE RESPIRATORY (INHALATION) at 19:31

## 2018-06-04 RX ADMIN — MAGNESIUM SULFATE HEPTAHYDRATE 2 G: 40 INJECTION, SOLUTION INTRAVENOUS at 05:50

## 2018-06-04 RX ADMIN — METHYLPREDNISOLONE SODIUM SUCCINATE 40 MG: 40 INJECTION, POWDER, FOR SOLUTION INTRAMUSCULAR; INTRAVENOUS at 17:00

## 2018-06-04 RX ADMIN — ACETAMINOPHEN 650 MG: 325 TABLET ORAL at 21:11

## 2018-06-04 RX ADMIN — BUDESONIDE 0.5 MG: 0.5 INHALANT RESPIRATORY (INHALATION) at 19:31

## 2018-06-04 RX ADMIN — IPRATROPIUM BROMIDE 0.5 MG: 0.5 SOLUTION RESPIRATORY (INHALATION) at 05:46

## 2018-06-04 RX ADMIN — IPRATROPIUM BROMIDE 0.5 MG: 0.5 SOLUTION RESPIRATORY (INHALATION) at 14:22

## 2018-06-04 RX ADMIN — Medication 1 TABLET: at 16:57

## 2018-06-04 RX ADMIN — SODIUM CHLORIDE, SODIUM LACTATE, POTASSIUM CHLORIDE, AND CALCIUM CHLORIDE 125 ML/HR: .6; .31; .03; .02 INJECTION, SOLUTION INTRAVENOUS at 22:07

## 2018-06-04 RX ADMIN — LORATADINE 10 MG: 10 TABLET ORAL at 16:56

## 2018-06-04 RX ADMIN — ACETAMINOPHEN 650 MG: 325 TABLET ORAL at 13:46

## 2018-06-04 RX ADMIN — METHYLPREDNISOLONE SODIUM SUCCINATE 40 MG: 40 INJECTION, POWDER, FOR SOLUTION INTRAMUSCULAR; INTRAVENOUS at 23:23

## 2018-06-04 RX ADMIN — METHYLPREDNISOLONE SODIUM SUCCINATE 125 MG: 125 INJECTION, POWDER, FOR SOLUTION INTRAMUSCULAR; INTRAVENOUS at 05:50

## 2018-06-04 RX ADMIN — ALBUTEROL SULFATE 5 MG: 2.5 SOLUTION RESPIRATORY (INHALATION) at 14:21

## 2018-06-04 RX ADMIN — POTASSIUM CHLORIDE 40 MEQ: 1500 TABLET, EXTENDED RELEASE ORAL at 06:31

## 2018-06-04 RX ADMIN — FAMOTIDINE 20 MG: 10 INJECTION, SOLUTION INTRAVENOUS at 18:55

## 2018-06-04 RX ADMIN — PREDNISONE 20 MG: 20 TABLET ORAL at 13:46

## 2018-06-04 NOTE — CONSULTS
Physician Consult Note - Maternal Fetal Medicine  Lyle Peraza 40 y o  female MRN: [id number]  Unit/Bed#: [facility] 815-01 Encounter: [id number]    Chief Complaint: "I am having an asthma attack"    ASSESSMENT  39 yo [id number] at 34 5 p/w acute asthma exacerbation secondary to very poorly controlled, severely persistent asthma, now HD # 1 and stable  PLAN:   Acute asthma exacerbation:    Albuterol neb prn   Start symbicort   Resume prednisone 20 mg q daily   pulseox 97 @ 11:45 am, maternal  bpm (just had neb tx), RR 28   Eosinophilia: 0% on CBC w/diff   Peak flow q6h   Continuous pulseox  Seasonal allergies: zyrtec  GI PPx: tums, pepcid  h/o 30 hospitalizations for asthma: pulmonary c/s  Anemia: Hb 9 8; start iron bid w/colace prn  IUP: prenatal vitamin, cEFM, external toco   Hypokalemia: K+ 3 0: s/p K-Dur 40; repeat again in am  Diet: regular  Pain mngmt: tylenol, aqua k  IV fluids: LR   DVT px: OOB, SCDs  Dispo: inpt until stabilized    SUBJECTIVE:      Pt presents with worsening SOB, cough and wheezing that has not responded to albuterol MDI or neb  Pt recently ran out of prednisone 20 mg q daily PO on 5/31/18 and was going to refill her Rx today but did not have a chance yet  She presented to the ED in [place] [address] this am for evaluation  Was given albuterol 5 mg neb x2 doses, 0 5 mg atrovent neb, 2 g Mag++, 125 mg solumedrol  She receives pulmonary care through Dr [facility])  She receives Obstetric care through Dr Kunal Burton () in [place]  She reports a h/o multiple hospitalizations for acute asthma exacerbation since moving from [place] to [facility] in 2009  She has never been intubated for asthma  She has tried multiple asthma medications with minimal improvement, including Spiriva, Symbicort, Montelukast, and Atrovent      SOB: yes  cough: yes  wheezing: yes  h/o previous asthma diagnosis: yes  current asthma medications:    Albuterol MDI   Albuterol neb   Cetirizine (to control seasonal allergies)   Prednisone (ran out on 5/31/18; intended to refill today but has not yet had a chance)   Does not take singulair, atrovent, or spiriva  active tobacco use: no  triggers:   allergens: yes - seasonal allergies   cold air: no   exercise: intermittently   viral infection: no   indoor pets: no     Mold visible in any part of your home? no  Have you seen cockroaches or rodents in his or her home in the past month? no  Do symptoms get worse in:   Early spring? (Trees): no   Late spring? (Grasses): no   Late summer to gillian? Ruby Jacey): no   Summer and fall? (Alternaria, Cladosporium, mites): no   Cold months in temperate climates? (Suggests indoor allergens, such as animal dander): no  Does anyone smoke at home or work? no  Does anyone smoke at the child's ? no  Do you use a wood burning stove or fireplace at home? no  Are there unvented stoves or heaters at home? no  Do you have contact with other smells or fumes from perfumes, cleaning agents, or sprays? no  Have there been recent renovations or painting in the home? no  Do you have constant or seasonal nasal congestion, runny nose, and/or postnasal drip? no  Do you have heartburn? yes  Does food sometimes come up into your throat? yes  Have you had coughing, wheezing, or shortness of breath at night in the past four weeks? yes  Do you have wheezing, coughing, or shortness of breath after eating shrimp, dried fruit, or processed potatoes or after drinking beer or wine? Yes - shellfish  Do you use eye drops?  What type? no      Vaginal Bleeding: no  Leaking of Fluid: no  Contractions: no  Fetal Movement: yes    Classification of asthma control:  Symptoms: throughout the day  Nighttime awakenings: 4x/wk  Interference with normal activity: yes  Short-acting beta2-agonist use for symptom control: 3-4x/wk  Diagnosis: Very poorly controlled asthma:    OBJECTIVE:     Spoke to her Obstetric care provider Dr Adriana Reece ([phone]) in [place]  Per Dr Bobby Vasquez, she had no prenatal care until 22 weeks  She has a previous diagnosis of obstructive sleep apnea  BMI 37  AMA  H/o Meningitis 5x (3 viral episodes, 2 bacterial episodes)  PCOS  h/o  @ 35 due to PPROM, Pre-e w/SF, not on 17-hydroxyprogesterone  Last  scan on 5/15/18: EFW 65% @ 30 6 EGA, normal NITO, 1010 BPP, vtx presentation  NST: reactive  Membranes: intact     Vitals:   Patient Vitals for the past 24 hrs:   BP Temp Temp src Pulse Resp SpO2 Height Weight   18 1423 - - - - - 97 % - -   18 1200 - - - (!) 120 - 97 % - -   18 1047 119/74 99 2 °F (37 3 °C) Oral (!) 131 20 95 % 5' 2" (1 575 m) 90 9 kg (200 lb 4 8 oz)         I/O     None          Physical Exam:  General:    No Acute Distress  Cardiovascular: Tachycardia, Regular Rhythm  Lungs:  Inspiratory and expiratory wheezes bilaterally, Tachypnic (RR 28)   Not sitting in tripod position   No visible use of accessory muscles (i e  SCM) while breathing  Abdomen:    soft, non-distended    abdominal tenderness: absent    rebound: absent    guarding: absent   Lower Extremities: Non-Tender  Peak expiratory flow:    Predicted: 490 L / min   Actual: 305 L / min      OB History    Para Term  AB Living   7 2 1   4 2   SAB TAB Ectopic Multiple Live Births   1 2 1 1 2      # Outcome Date GA Lbr Mayur/2nd Weight Sex Delivery Anes PTL Lv   7A             7B Current            6 Term 2016     Vag-Spont   CHAD      Complications: PROM (premature rupture of membranes)   5 SAB 2012           4 TAB 2011           3 Ectopic 2010           2 TAB 2004           1 Para 2003   1928 g (4 lb 4 oz)  Vag-Spont  N CHAD      Complications: Preeclampsia      Obstetric Comments   SPONTANEOUS  COMPLETE   HISTORY OF PRE-ECLAMPSIA     Past Medical History:   Diagnosis Date    Anemia     Asthma     Meningitis     Migraines      Past Surgical History:   Procedure Laterality Date    DILATION AND CURETTAGE OF UTERUS      SURGICALLY INDUCED  BY D&C    ECTOPIC PREGNANCY SURGERY Left     SALPINGECTOMY Left     RESOLVED        There is no immunization history on file for this patient    Allergies   Allergen Reactions    Shellfish-Derived Products Anaphylaxis    Pollen Extract Other (See Comments)         Results from last 7 days  Lab Units 18  0538   WBC Thousand/uL 12 06*   HEMOGLOBIN g/dL 9 7*   MCV fL 84   PLATELETS Thousands/uL 222       Results from last 7 days  Lab Units 18  0538   NEUTROS PCT % 76*   MONOS PCT % 8   EOS PCT % 0         Results from last 7 days  Lab Units 18  0538   SODIUM mmol/L 141   POTASSIUM mmol/L 3 0*   CHLORIDE mmol/L 106   CO2 mmol/L 21   ANION GAP mmol/L 14*   GLUCOSE RANDOM mg/dL 89   BUN mg/dL 4*   CREATININE mg/dL 0 81   EGFR ml/min/1 73sq m 108               Results from last 7 days  Lab Units 18  0538   TROPONIN I ng/mL <0 02           Signature / Title: Mary Lou Sanders MD, Resident - Ob/Gyn    Date: 2018  Time: 11:33 AM

## 2018-06-04 NOTE — H&P
History  & Physical - OB/GYN   Odin Mars 40 y o  (1980) - MRN: [id number]  Unit/Bed#: [facility] 815-01 Encounter: [id number]    ASSESSMENT  39 yo [id number] at 27 8 p/w acute asthma exacerbation secondary to very poorly controlled, severely persistent asthma, now HD # 1 and stable  PLAN:  Acute asthma exacerbation:               Albuterol neb prn              Start symbicort              Resume prednisone 20 mg q daily              pulseox 97 @ 11:45 am, maternal  bpm (just had neb tx), RR 28              Eosinophilia: 0% on CBC w/diff              Peak flow q6h              Continuous pulseox  Seasonal allergies: zyrtec  GI PPx: tums, pepcid  h/o multiple hospitalizations for asthma: pulmonary c/s  Anemia: Hb 9 8; start iron bid w/colace prn  IUP: prenatal vitamin, cEFM, external toco   Hypokalemia: K+ 3 0: s/p K-Dur 40; repeat again in am  Diet: regular  Pain mngmt: tylenol, aqua k  IV fluids: LR   DVT px: OOB, SCDs  Dispo: inpt until stabilized            Chief complaint:  "I am having an asthma attack"    SUBJECTIVE:  HPI:  Please see MFM c/s      OBJECTIVE:    Patient Active Problem List   Diagnosis    Asthma    Migraine    PCOS (polycystic ovarian syndrome)    Encounter for supervision of normal pregnancy in multigravida in second trimester    History of pre-eclampsia in prior pregnancy, currently pregnant in second trimester    History of  delivery, currently pregnant in second trimester    Advanced maternal age in multigravida, second trimester    History of ectopic pregnancy       OB History    Para Term  AB Living   7 2 1   4 2   SAB TAB Ectopic Multiple Live Births   1 2 1 1 2      # Outcome Date GA Lbr Mayur/2nd Weight Sex Delivery Anes PTL Lv   7A             7B Current            6 Term 2016     Vag-Spont   CHAD      Complications: PROM (premature rupture of membranes)   5 SAB            4 TAB 2011           3 Ectopic            2 TAB  1 Para    1928 g (4 lb 4 oz)  Vag-Spont  N CHAD      Complications: Preeclampsia      Obstetric Comments   SPONTANEOUS  COMPLETE   HISTORY OF PRE-ECLAMPSIA       Past Medical History:   Diagnosis Date    Anemia     Asthma     Meningitis     Migraines        Past Surgical History:   Procedure Laterality Date    DILATION AND CURETTAGE OF UTERUS      SURGICALLY INDUCED  BY D&C    ECTOPIC PREGNANCY SURGERY Left     SALPINGECTOMY Left     RESOLVED        Current Facility-Administered Medications on File Prior to Encounter   Medication Dose Route Frequency Provider Last Rate Last Dose    [COMPLETED] albuterol inhalation solution 5 mg  5 mg Nebulization Once Leyla Mauricio MD   5 mg at 18 0546    [COMPLETED] albuterol inhalation solution 5 mg  5 mg Nebulization Once Dwaine Escobedo DO   5 mg at 18 0731    [COMPLETED] ipratropium (ATROVENT) 0 02 % inhalation solution 0 5 mg  0 5 mg Nebulization Once Leyla Mauricio MD   0 5 mg at 18 0546    [COMPLETED] magnesium sulfate 2 g/50 mL IVPB (premix) 2 g  2 g Intravenous Once Leyla Mauricio MD   Stopped at 18 0640    [COMPLETED] methylPREDNISolone sodium succinate (Solu-MEDROL) injection 125 mg  125 mg Intravenous Once Leyla Mauricio MD   125 mg at 18 0550    [COMPLETED] potassium chloride (K-DUR,KLOR-CON) CR tablet 40 mEq  40 mEq Oral Once Leyla Mauricio MD   40 mEq at 18 0631    [DISCONTINUED] sodium chloride (PF) 0 9 % injection 3 mL  3 mL Intravenous PRN Leyla Mauricio MD         Current Outpatient Prescriptions on File Prior to Encounter   Medication Sig Dispense Refill    albuterol (2 5 mg/3 mL) 0 083 % nebulizer solution 3 ml  dx:[id number]      montelukast (SINGULAIR) 10 mg tablet 1 tab(s)      Prenat w/o Z-[id number]-FA-DHA (PRENATE DHA) 28-0 6-0 4-300 MG CAPS Take by mouth      [DISCONTINUED] predniSONE 10 mg tablet 4 TABS DAILY FOR 2 DAYS, 3 TABS DAILY FOR 2 DAYS, 2 TABS DAILY FOR 2 DAYS, 1 TAB DAILY FOR 2 DAYS  0    albuterol (PROVENTIL HFA,VENTOLIN HFA) 90 mcg/act inhaler Inhale 1 puff every 6 (six) hours      albuterol (VENTOLIN HFA) 90 mcg/act inhaler Inhale      ipratropium (ATROVENT) 0 02 % nebulizer solution Take 2 5 mL by nebulization 4 (four) times a day 75 mL 0    tiotropium (SPIRIVA) 18 mcg inhalation capsule Place 18 mcg into inhaler and inhale daily      [DISCONTINUED] Mometasone Furo-Formoterol Fum (DULERA) 100-5 MCG/ACT AERO Inhale         Allergies   Allergen Reactions    Shellfish-Derived Products Anaphylaxis    Pollen Extract Other (See Comments)       Social History     Social History    Marital status: Single     Spouse name: N/A    Number of children: N/A    Years of education: N/A     Occupational History    Not on file  Social History Main Topics    Smoking status: Never Smoker    Smokeless tobacco: Never Used    Alcohol use No    Drug use: No    Sexual activity: Yes     Partners: Male     Other Topics Concern    Not on file     Social History Narrative    ENGAGED TO ME        Labs:     Infectious:    N gonorrhoeae, DNA Probe   Date Value Ref Range Status   01/03/2018 N  gonorrhoeae Amplified DNA Negative N  gonorrhoeae Amplified DNA Negative Final     Chlamydia, DNA Probe   Date Value Ref Range Status   01/03/2018 C  trachomatis Amplified DNA Negative C  trachomatis Amplified DNA Negative Final                       Results from last 7 days  Lab Units 06/04/18  0538   WBC Thousand/uL 12 06*   HEMOGLOBIN g/dL 9 7*   MCV fL 84   PLATELETS Thousands/uL 222       Results from last 7 days  Lab Units 06/04/18  0538   NEUTROS PCT % 76*   MONOS PCT % 8   EOS PCT % 0         Results from last 7 days  Lab Units 06/04/18  0538   SODIUM mmol/L 141   POTASSIUM mmol/L 3 0*   CHLORIDE mmol/L 106   CO2 mmol/L 21   ANION GAP mmol/L 14*   GLUCOSE RANDOM mg/dL 89   BUN mg/dL 4*   CREATININE mg/dL 0 81   EGFR ml/min/1 73sq m 108       Physical Exam:  Vital signs:  Vitals: 06/04/18 1047 06/04/18 1200 06/04/18 1423   BP: 119/74     BP Location: Left arm     Pulse: (!) 131 (!) 120    Resp: 20     Temp: 99 2 °F (37 3 °C)     TempSrc: Oral     SpO2: 95% 97% 97%   Weight: 90 9 kg (200 lb 4 8 oz)     Height: 5' 2" (1 575 m)         General:               No Acute Distress  Cardiovascular: Tachycardia, Regular Rhythm  Lungs:  Inspiratory and expiratory wheezes bilaterally, Tachypnic (RR 28)              Not sitting in tripod position              No visible use of accessory muscles (i e  SCM) while breathing  Abdomen:    gravid              soft, non-distended               abdominal tenderness: absent               rebound: absent               guarding: absent   Lower Extremities: Non-Tender  Peak expiratory flow:               Predicted: 490 L / min              Actual: 305 L / min    NST: reactive  Membranes: intact

## 2018-06-04 NOTE — PROGRESS NOTES
Per EMS with [facility], patient received 2 Albuterol treatment on her way from [facility] to [place]

## 2018-06-04 NOTE — ED PROVIDER NOTES
History  Chief Complaint   Patient presents with    Shortness of Breath     Pt c/o SOB for a few days and is 35 weeks pregnant  Pt states treatments at home aren't working "feels like I'm drowning "      40year-old female  with a history of severe asthma and history of pre-eclampsia, one episode of  presents with worsening dyspnea that has been refractory to her albuterol that she used repeatedly without improvement  Patient has received prenatal care including ultrasounds but is visiting the area from [place]  Patient has been hospitalized numerous times in the past for her asthma but denies any history of intubations  Patient finished a course of corticosteroids a few days ago for her asthma  Patient noted diaphrosis yesterday, no other symptoms  Impression and plan:  Dyspnea with a broad differential   Based on patient's history and physical, concerning for potential acute exacerbation of patient's asthma  Possibly secondary to environmental triggers considering recent visit to the area  Patient has no history of venous thrombus, risk factors for pregnancy but no history makes this less likely considering patient's symptoms are consistent with her prior history of asthma exacerbations  Will treat patient symptomatically for asthma exacerbation and consult with OBGYN for continued management and evaluation as there is no obstetrics availability at [place] and patient will likely require admission considering recent completion of corticosteroids and increasing use of nebulized medications  Patient is mildly hypertensive upon arrival and has a history of preeclampsia  No headache or visual changes  Will continue to monitor and discussed this with OBGYN          Shortness of Breath   Severity:  Severe  Onset quality:  Gradual  Timing:  Constant  Progression:  Worsening  Chronicity:  New  Worsened by:  Exertion  Ineffective treatments:  Inhaler  Associated symptoms: wheezing    Associated symptoms: no abdominal pain, no chest pain, no claudication, no cough, no diaphoresis ( yesterday, resolved), no ear pain, no fever, no headaches, no hemoptysis, no neck pain, no PND, no rash, no sore throat, no sputum production, no syncope, no swollen glands and no vomiting        Prior to Admission Medications   Prescriptions Last Dose Informant Patient Reported? Taking? Prenat w/o E-NJ-Odojemm-FA-DHA (PRENATE DHA) 28-0 6-0 4-300 MG CAPS  Self Yes No   Sig: Take by mouth   albuterol (2 5 mg/3 mL) 0 083 % nebulizer solution  Self Yes No   Sig: 3 ml  dx:[id number]   albuterol (PROVENTIL HFA,VENTOLIN HFA) 90 mcg/act inhaler  Self Yes No   Sig: Inhale 1 puff every 6 (six) hours   albuterol (VENTOLIN HFA) 90 mcg/act inhaler  Self Yes No   Sig: Inhale   ipratropium (ATROVENT) 0 02 % nebulizer solution  Self No No   Sig: Take 2 5 mL by nebulization 4 (four) times a day   montelukast (SINGULAIR) 10 mg tablet  Self Yes No   Si tab(s)   tiotropium (SPIRIVA) 18 mcg inhalation capsule   Yes Yes   Sig: Place 18 mcg into inhaler and inhale daily      Facility-Administered Medications: None       Past Medical History:   Diagnosis Date    Anemia     Asthma     Meningitis     Migraines        Past Surgical History:   Procedure Laterality Date    DILATION AND CURETTAGE OF UTERUS      SURGICALLY INDUCED  BY D&C    ECTOPIC PREGNANCY SURGERY Left     SALPINGECTOMY Left     RESOLVED        Family History   Problem Relation Age of Onset    Breast cancer Mother     Diabetes Mother     Hypertension Mother     Hypertension Father     Asthma Brother     Breast cancer Maternal Grandmother     Leukemia Maternal Grandmother     Hypertension Maternal Grandmother     Hypertension Paternal Grandmother     Heart disease Paternal Grandmother     Alzheimer's disease Paternal Grandmother     Dementia Paternal Grandmother     No Known Problems Paternal Grandfather     Breast cancer Maternal Aunt     Hypertension Family     Leukemia Daughter      I have reviewed and agree with the history as documented  Social History   Substance Use Topics    Smoking status: Never Smoker    Smokeless tobacco: Never Used    Alcohol use No        Review of Systems   Constitutional: Negative for diaphoresis ( yesterday, resolved) and fever  HENT: Negative for ear pain and sore throat  Respiratory: Positive for shortness of breath and wheezing  Negative for cough, hemoptysis and sputum production  Cardiovascular: Negative for chest pain, claudication, syncope and PND  Gastrointestinal: Negative for abdominal pain and vomiting  Musculoskeletal: Negative for neck pain  Skin: Negative for rash  Neurological: Negative for headaches  Physical Exam  Physical Exam   Constitutional: She appears well-developed and well-nourished  HENT:   Head: Normocephalic and atraumatic  Eyes: Pupils are equal, round, and reactive to light  Neck: Neck supple  Cardiovascular: Normal rate  Pulmonary/Chest: No respiratory distress  She has wheezes  She has no rales  Mildly tachypneic  No use of accessory muscles  Abdominal: Soft  She exhibits no distension and no mass  There is no tenderness  There is no guarding  Gravid  Musculoskeletal: She exhibits no edema, tenderness or deformity  No clinical signs of DVT  Neurological: She is alert  Skin: Skin is warm and dry  Psychiatric: She has a normal mood and affect  Vitals reviewed        Vital Signs  ED Triage Vitals   Temperature Pulse Respirations Blood Pressure SpO2   06/04/18 0528 06/04/18 0528 06/04/18 0528 06/04/18 0528 06/04/18 0528   98 4 °F (36 9 °C) (!) 115 21 144/80 97 %      Temp Source Heart Rate Source Patient Position - Orthostatic VS BP Location FiO2 (%)   06/04/18 0528 06/04/18 0528 06/04/18 0615 06/04/18 0615 --   Oral Monitor Lying Right arm       Pain Score       06/04/18 0528       No Pain           Vitals:    06/04/18 0545 06/04/18 0615 06/04/18 0730 06/04/18 0800   BP: 154/93 152/91 147/88 143/81   Pulse: (!) 114 (!) 116 (!) 121 (!) 140   Patient Position - Orthostatic VS:  Lying Lying        Visual Acuity      ED Medications  Medications   albuterol inhalation solution 5 mg (5 mg Nebulization Given 6/4/18 0546)   ipratropium (ATROVENT) 0 02 % inhalation solution 0 5 mg (0 5 mg Nebulization Given 6/4/18 0546)   methylPREDNISolone sodium succinate (Solu-MEDROL) injection 125 mg (125 mg Intravenous Given 6/4/18 0550)   magnesium sulfate 2 g/50 mL IVPB (premix) 2 g (0 g Intravenous Stopped 6/4/18 0640)   potassium chloride (K-DUR,KLOR-CON) CR tablet 40 mEq (40 mEq Oral Given 6/4/18 0631)   albuterol inhalation solution 5 mg (5 mg Nebulization Given 6/4/18 0731)       Diagnostic Studies  Results Reviewed     Procedure Component Value Units Date/Time    Troponin I [[id number]]  (Normal) Collected:  06/04/18 0538    Lab Status:  Final result Specimen:  Blood from Arm, Right Updated:  06/04/18 0611     Troponin I <0 02 ng/mL     Narrative:         [employer] Chemistry analyzer 99% cutoff is > 0 04 ng/mL in network labs    o cTnI 99% cutoff is useful only when applied to patients in the clinical setting of myocardial ischemia  o cTnI 99% cutoff should be interpreted in the context of clinical history, ECG findings and possibly cardiac imaging to establish correct diagnosis  o cTnI 99% cutoff may be suggestive but clearly not indicative of a coronary event without the clinical setting of myocardial ischemia      Basic metabolic panel [[id number]]  (Abnormal) Collected:  06/04/18 0538    Lab Status:  Final result Specimen:  Blood from Arm, Right Updated:  06/04/18 0603     Sodium 141 mmol/L      Potassium 3 0 (L) mmol/L      Chloride 106 mmol/L      CO2 21 mmol/L      Anion Gap 14 (H) mmol/L      BUN 4 (L) mg/dL      Creatinine 0 81 mg/dL      Glucose 89 mg/dL      Calcium 9 0 mg/dL      eGFR 108 ml/min/1 73sq m     Narrative:         National Kidney Disease Education Program recommendations are as follows:  GFR calculation is accurate only with a steady state creatinine  Chronic Kidney disease less than 60 ml/min/1 73 sq  meters  Kidney failure less than 15 ml/min/1 73 sq  meters  CBC and differential [[id number]]  (Abnormal) Collected:  06/04/18 0538    Lab Status:  Final result Specimen:  Blood from Arm, Right Updated:  06/04/18 0550     WBC 12 06 (H) Thousand/uL      RBC 3 59 (L) Million/uL      Hemoglobin 9 7 (L) g/dL      Hematocrit 30 2 (L) %      MCV 84 fL      MCH 27 0 pg      MCHC 32 1 g/dL      RDW 13 8 %      MPV 10 9 fL      Platelets 157 Thousands/uL      nRBC 0 /100 WBCs      Neutrophils Relative 76 (H) %      Immat GRANS % 1 %      Lymphocytes Relative 15 %      Monocytes Relative 8 %      Eosinophils Relative 0 %      Basophils Relative 0 %      Neutrophils Absolute 9 24 (H) Thousands/µL      Immature Grans Absolute 0 07 Thousand/uL      Lymphocytes Absolute 1 80 Thousands/µL      Monocytes Absolute 0 91 Thousand/µL      Eosinophils Absolute 0 02 Thousand/µL      Basophils Absolute 0 02 Thousands/µL                  No orders to display              Procedures  Procedures       Phone Contacts  ED Phone Contact    ED Course  ED Course as of Jun 05 0643   Mon Jun 04, 2018   5287 [facility] and [facility]  Was okay with corticosteroids and magnesium  Suggested holding any antihypertensives and just continuing to monitor blood pressure at present                                   MDM  CritCare Time    Disposition  Final diagnoses:   Asthma affecting pregnancy, antepartum     Time reflects when diagnosis was documented in both MDM as applicable and the Disposition within this note     Time User Action Codes Description Comment    6/4/2018  5:43 AM Keron Oropeza Add [O99 [id number],  J45 [id number]] Asthma affecting pregnancy, antepartum       ED Disposition     ED Disposition Condition Comment Transfer to Another [address] [place] [address] should be transferred out to B  MD Documentation      Most Recent Value   Patient Condition  The patient has been stabilized such that within reasonable medical probability, no material deterioration of the patient condition or the condition of the unborn child(elinor) is likely to result from the transfer   Reason for Transfer  Level of Care needed not available at this facility   Benefits of Transfer  Specialized equipment and/or services available at the receiving facility (Include comment)________________________ [OBGYN]   Risks of Transfer  Potential for delay in receiving treatment, Potential deterioration of medical condition, Loss of IV, Increased discomfort during transfer, Possible worsening of condition or death during transfer   Accepting Physician  [address], [address]    (Name & Tel number)  PACS   Transported by [employer] and Unit #)  [facility]   Sending MD  [facility]   Provider Certification  General risk, such as traffic hazards, adverse weather conditions, rough terrain or turbulence, possible failure of equipment (including vehicle or aircraft), or consequences of actions of persons outside the control of the transport personnel      RN Documentation      [address], [address]   [facility] [place]    (Name & Tel number)  PACS   Transported by (Company and Unit #)  [employer]      Follow-up Information    None         Discharge Medication List as of 6/4/2018  9:24 AM      CONTINUE these medications which have NOT CHANGED    Details   albuterol (2 5 mg/3 mL) 0 083 % nebulizer solution 3 ml  dx:[id number] 9, Historical Med      montelukast (SINGULAIR) 10 mg tablet 1 tab(s), Historical Med      Prenat w/o A-WI-Owiscar-FA-DHA (PRENATE DHA) 28-0 6-0 4-300 MG CAPS Take by mouth, Starting Thu 12/14/2017, Historical Med      predniSONE 10 mg tablet 4 TABS DAILY FOR 2 DAYS, 3 TABS DAILY FOR 2 DAYS, 2 TABS DAILY FOR 2 DAYS, 1 TAB DAILY FOR 2 DAYS, Historical Med      !! albuterol (PROVENTIL HFA,VENTOLIN HFA) 90 mcg/act inhaler Inhale 1 puff every 6 (six) hours, Starting Tue 12/19/2017, Until Wed 12/19/2018, Historical Med      !! albuterol (VENTOLIN HFA) 90 mcg/act inhaler Inhale, Historical Med      ipratropium (ATROVENT) 0 02 % nebulizer solution Take 2 5 mL by nebulization 4 (four) times a day, Starting Tue 1/9/2018, Print      tiotropium (SPIRIVA) 18 mcg inhalation capsule Place 18 mcg into inhaler and inhale daily, Historical Med       !! - Potential duplicate medications found  Please discuss with provider  No discharge procedures on file      ED Provider  Electronically Signed by           Marlyn Terrazas MD  06/05/18 3310

## 2018-06-04 NOTE — PROGRESS NOTES
From 1129-2112 maternal pulse was tracing on the fetal monitor  u/s was adjusted and fht's were tracing in the 140's  Pt sitting in a chair  Baby active  Denies contractions, cramping or vaginal leakage

## 2018-06-04 NOTE — ED NOTES
Pt reports feeling that she is having a more difficult time breathing  Provider notified       Lola yLnch RN  06/04/18 0350

## 2018-06-04 NOTE — CONSULTS
Pulmonary Consultation   Yaa Winslow 40 y o  female MRN: [id number]  Unit/Bed#: [facility] 815-01 Encounter: [id number]      Reason for consultation: Asthma exacerbation    Requesting physician: Paulina Banerjee    Impressions/Plan:   1  Severe persistent asthma with acute exacerbation        *  Increase steroids to solumedrol 40mg Q8hrs        *  D/C symbicort and start pulmicort Q12hrs        *  Continue Xopenex Q6hrs  Stop Atrovent        *  Will need outpatient PFTs as well as RAST/IgE level once post partum  May be candidate for injectable therapy  2  Allergic rhinitis        *  Continue Claritin        *  May need outpatient referral to allergist pending results of above testing  3  GERD        *  Continue IV pepcid per primary team        *  Likely major contributing factor to worsening asthma    ~Will need outpatient pulmonary follow up in our [facility] once baby is delivered and PFTs/blood work can be done    History of Present Illness   HPI:  Yaa Winslow is a 40 y o  female who initially presented to [facility] Emergency room earlier today with increasing shortness of breath, dry cough and bronchospasm  She does have a known history of asthma and recently ran out of her prednisone on May 31, 2018  She was going to get this prescription refilled today but unfortunately due to her worsening symptoms she was not able to  She notes that over the past 3 years she has been on some dosage of prednisone  Over the past few weeks to months she has been maintained on 20 mg daily  She is followed with a pulmonologist in the [facility]  Prior to her move here, she was followed by a pulmonologist in [place]  At home, she is maintained on as needed albuterol as well as as-needed nebulizer with albuterol and Atrovent  She has poor asthma control and uses her rescue inhaler upwards of 8 times daily  She does use her Atrovent only when her symptoms are extremely bad    She has required multiple hospitalizations for her asthma, but has never required intubation  She also has been seen by an allergist in the past   She never required immunotherapy  She was told she was allergic to everything    This is the reason why she has moved  She has been on multiple medications for her asthma with minimal improvement  She has been on Spiriva, Symbicort, Singulair as well as the above for mentioned medications  She also gives a history of seasonal allergies as well as ongoing reflux  She is a never smoker and denies any significant occupational exposures  Since becoming pregnant, her asthma has worsened  In the emergency room, she was given albuterol nebulizer x2 along with Atrovent, magnesium and Solu-Medrol  She was transferred to [facility] as she is 29 & 1/2 weeks pregnant  Currently, she denies chest pain, resting shortness of breath or fevers  She denies any recent travel or sick contacts  A pulmonary consultation has been requested due to her underlying asthma exacerbation  Review of systems:  12 point review of systems was completed and was otherwise negative except as listed in HPI        Historical Information   Past Medical History:   Diagnosis Date    Anemia     Asthma     Meningitis     Migraines      Past Surgical History:   Procedure Laterality Date    DILATION AND CURETTAGE OF UTERUS      SURGICALLY INDUCED  BY D&C    ECTOPIC PREGNANCY SURGERY Left     SALPINGECTOMY Left     RESOLVED      Family History   Problem Relation Age of Onset    Breast cancer Mother     Diabetes Mother     Hypertension Mother     Hypertension Father     Asthma Brother     Breast cancer Maternal Grandmother     Leukemia Maternal Grandmother     Hypertension Maternal Grandmother     Hypertension Paternal Grandmother     Heart disease Paternal Grandmother     Alzheimer's disease Paternal Grandmother     Dementia Paternal Grandmother     No Known Problems Paternal Grandfather     Breast cancer Maternal Aunt     Hypertension Family     Leukemia Daughter        Occupational history: No known occupational exposures    Tobacco history: Never smoker    Meds/Allergies   Current Facility-Administered Medications   Medication Dose Route Frequency    acetaminophen (TYLENOL) tablet 650 mg  650 mg Oral Q6H PRN    albuterol inhalation solution 5 mg  5 mg Nebulization Q4H PRN    budesonide-formoterol (SYMBICORT) 160-4 5 mcg/act inhaler 2 puff  2 puff Inhalation BID    calcium carbonate (TUMS) chewable tablet 1,000 mg  1,000 mg Oral TID PRN    docusate sodium (COLACE) capsule 100 mg  100 mg Oral BID    famotidine (PEPCID) injection 20 mg  20 mg Intravenous Q24H FAVIOLA    ferrous sulfate tablet 325 mg  325 mg Oral BID AC    ipratropium (ATROVENT) 0 02 % inhalation solution 0 5 mg  0 5 mg Nebulization Q6H    lactated ringers infusion  125 mL/hr Intravenous Continuous    levalbuterol (XOPENEX) inhalation solution 1 25 mg  1 25 mg Nebulization Q6H    loratadine (CLARITIN) tablet 10 mg  10 mg Oral Daily    ondansetron (ZOFRAN) injection 4 mg  4 mg Intravenous Q6H PRN    [START ON 6/5/2018] potassium chloride (K-DUR,KLOR-CON) CR tablet 40 mEq  40 mEq Oral Once    predniSONE tablet 20 mg  20 mg Oral Daily    prenatal multivitamin tablet 1 tablet  1 tablet Oral Daily     Prescriptions Prior to Admission   Medication    acetaminophen (TYLENOL) 500 mg tablet    albuterol (2 5 mg/3 mL) 0 083 % nebulizer solution    albuterol (PROVENTIL HFA,VENTOLIN HFA) 90 mcg/act inhaler    montelukast (SINGULAIR) 10 mg tablet    predniSONE 20 mg tablet    Prenat w/o K-KM-Yrjxnov-FA-DHA (PRENATE DHA) 28-0 6-0 4-300 MG CAPS    albuterol (PROVENTIL HFA,VENTOLIN HFA) 90 mcg/act inhaler    albuterol (VENTOLIN HFA) 90 mcg/act inhaler    ipratropium (ATROVENT) 0 02 % nebulizer solution    tiotropium (SPIRIVA) 18 mcg inhalation capsule     Allergies   Allergen Reactions    Shellfish-Derived Products Anaphylaxis    Pollen Extract Other (See Comments)       Vitals: Blood pressure 119/74, pulse (!) 120, temperature 99 2 °F (37 3 °C), temperature source Oral, resp  rate 20, height 5' 2" (1 575 m), weight 90 9 kg (200 lb 4 8 oz), SpO2 97 %, unknown if currently breastfeeding , RA, Body mass index is 36 64 kg/m²  Intake/Output Summary (Last 24 hours) at 06/04/18 1556  Last data filed at 06/04/18 1200   Gross per 24 hour   Intake              325 ml   Output                0 ml   Net              325 ml       Physical exam:    General Appearance:    Alert, cooperative, no conversational dyspnea or accessory     muscle use       Head/eyes:    Normocephalic, without obvious abnormality, atraumatic,         PERRL, extraocular muscles intact, no scleral icterus    Nose:   Nares normal, septum midline, mucosa normal, no drainage    or sinus tenderness   Throat:   Moist mucous membranes, no thrush   Neck:   Supple, trachea midline, no adenopathy; no carotid    bruit or JVD   Lungs:     Decreased breath sounds throughout bilaterally with expiratory wheezes noted bilaterally  No rales or rhonchi heard  Chest Wall:    No tenderness or deformity    Heart:    Regular rate and rhythm, tachycardic, S1 and S2 normal, no murmur, rub   or gallop   Abdomen:     Soft, non-tender, bowel sounds active all four quadrants,     no masses, no organomegaly  +34 5wk IUP   Extremities:   Extremities normal, atraumatic, no cyanosis or edema   Skin:   Warm, dry, turgor normal, no rashes or lesions   Lymph nodes:   Cervical and supraclavicular nodes normal   Neurologic:   CNII-XII intact, normal strength, non-focal         Labs: I have personally reviewed pertinent lab results  , ABG: No results found for: PHART, JPH4AQC, PO2ART, ASP7NBA, J8JOSCUS, BEART, SOURCE, BNP: No results found for: BNP, CBC:   Lab Results   Component Value Date    WBC 12 06 (H) 06/04/2018    HGB 9 7 (L) 06/04/2018    HCT 30 2 (L) 06/04/2018    MCV 84 06/04/2018     06/04/2018    MCH 27 0 06/04/2018    MCHC 32 1 06/04/2018    RDW 13 8 06/04/2018    MPV 10 9 06/04/2018    NRBC 0 06/04/2018   , CMP:   Lab Results   Component Value Date     06/04/2018    K 3 0 (L) 06/04/2018     06/04/2018    CO2 21 06/04/2018    ANIONGAP 14 (H) 06/04/2018    BUN 4 (L) 06/04/2018    CREATININE 0 81 06/04/2018    GLUCOSE 89 06/04/2018    CALCIUM 9 0 06/04/2018    EGFR 108 06/04/2018   , PT/INR: No results found for: PT, INR, Troponin:   Lab Results   Component Value Date    TROPONINI <0 02 06/04/2018       Imaging and other studies: I have personally reviewed pertinent films in PACS     No pulmonary imaging in system to be reviewed    Pulmonary function testing: No PFTs in our system to be reviewed  Tells me she did have PFTs in [place] 2-3 years ago    EKG, Pathology, and Other Studies: I have personally reviewed pertinent reports        Code Status: Level 1 - Full Code      Isela Harper PA-C

## 2018-06-04 NOTE — SOCIAL WORK
Cm met with patient to explain Cm role and discuss discharge planning  Patient presented as alert and oriented  Patient is 34 5 weeks pregnant  Patient lives with fiance and 2 children in 2 story home with 3STE and 12 steps to get to 2nd floor  Patient's emergency contact is her mother, Seth Fuller [phone]  Patient has no POA/living will  Patient denied any SNF stays, VNA services, and ETOH treatment in the past   Patient stated she was depressed after her nephew committed suicide; patient stated she did not receive inpatient mental health treatment or see a psychiatrist but was prescribed medications through her PCP  Pharmacy: [facility]  PCP: located in [place]  Patient had concerns about her car, which is parked in the emergency room parking lot (she drove herself)  Cm informed security of patient's car make and model to be aware of vehicle  Cm following  CM reviewed d/c planning process including the following: identifying help at home, patient preference for d/c planning needs, Discharge Lounge, [facility] Meds to Bed program, availability of treatment team to discuss questions or concerns patient and/or family may have regarding understanding medications and recognizing signs and symptoms once discharged  CM also encouraged patient to follow up with all recommended appointments after discharge  Patient advised of importance for patient and family to participate in managing patients medical well being

## 2018-06-04 NOTE — ED NOTES
PACs called  Patient will be transferred to  in [place]  PACs will call back with a ETA of transport        Praveen Bowling RN  06/04/18 7955

## 2018-06-04 NOTE — EMTALA/ACUTE CARE TRANSFER
[address] [address]  Dept: [phone]      [facility] TRANSFER CONSENT    NAME Suad Elizabeth                                         1980                              MRN [id number]    I have been informed of my rights regarding examination, treatment, and transfer   by Dr Esther Navarrete MD    Benefits: Specialized equipment and/or services available at the receiving facility (Include comment)________________________ (OBGYN)    Risks: Potential for delay in receiving treatment, Potential deterioration of medical condition, Loss of IV, Increased discomfort during transfer, Possible worsening of condition or death during transfer      Consent for Transfer:  I acknowledge that my medical condition has been evaluated and explained to me by the emergency department physician or other qualified medical person and/or my attending physician, who has recommended that I be transferred to the service of  Accepting Physician: Chioma Terrazas at [address] Name, [address] : [facility]  The above potential benefits of such transfer, the potential risks associated with such transfer, and the probable risks of not being transferred have been explained to me, and I fully understand them  The doctor has explained that, in my case, the benefits of transfer outweigh the risks  I agree to be transferred  I authorize the performance of emergency medical procedures and treatments upon me in both transit and upon arrival at the receiving facility  Additionally, I authorize the release of any and all medical records to the receiving facility and request they be transported with me, if possible  I understand that the safest mode of transportation during a medical emergency is an ambulance and that the Hospital advocates the use of this mode of transport   Risks of traveling to the receiving facility by car, including absence of medical control, life sustaining equipment, such as oxygen, and medical personnel has been explained to me and I fully understand them  (VINOD CORRECT BOX BELOW)  [  ]  I consent to the stated transfer and to be transported by ambulance/helicopter  [  ]  I consent to the stated transfer, but refuse transportation by ambulance and accept full responsibility for my transportation by car  I understand the risks of non-ambulance transfers and I exonerate the Hospital and its staff from any deterioration in my condition that results from this refusal     X___________________________________________    DATE  18  TIME________  Signature of patient or legally responsible individual signing on patient behalf           RELATIONSHIP TO PATIENT_________________________          Provider Certification    NAME Suad Elizabeth                                         1980                              MRN [id number]    A medical screening exam was performed on the above named patient  Based on the examination:    Condition Necessitating Transfer The encounter diagnosis was Asthma affecting pregnancy, antepartum      Patient Condition: The patient has been stabilized such that within reasonable medical probability, no material deterioration of the patient condition or the condition of the unborn child(elinor) is likely to result from the transfer    Reason for Transfer: Level of Care needed not available at this facility    Transfer Requirements: Facility [facility]   · Space available and qualified personnel available for treatment as acknowledged by PACS  · Agreed to accept transfer and to provide appropriate medical treatment as acknowledged by       Chioma Terrazas  · Appropriate medical records of the examination and treatment of the patient are provided at the time of transfer   [address] _______  · Transfer will be performed by qualified personnel from [facility]  and appropriate transfer equipment as required, including the use of necessary and appropriate life support measures  Provider Certification: I have examined the patient and explained the following risks and benefits of being transferred/refusing transfer to the patient/family:  General risk, such as traffic hazards, adverse weather conditions, rough terrain or turbulence, possible failure of equipment (including vehicle or aircraft), or consequences of actions of persons outside the control of the transport personnel      Based on these reasonable risks and benefits to the patient and/or the unborn child(elinor), and based upon the information available at the time of the patients examination, I certify that the medical benefits reasonably to be expected from the provision of appropriate medical treatments at another medical facility outweigh the increasing risks, if any, to the individuals medical condition, and in the case of labor to the unborn child, from effecting the transfer      X____________________________________________ DATE 06/04/18        TIME_______      ORIGINAL - SEND TO MEDICAL RECORDS   COPY - SEND WITH PATIENT DURING TRANSFER

## 2018-06-05 LAB
ATRIAL RATE: 106 BPM
P AXIS: 85 DEGREES
PR INTERVAL: 132 MS
QRS AXIS: 89 DEGREES
QRSD INTERVAL: 80 MS
QT INTERVAL: 322 MS
QTC INTERVAL: 427 MS
T WAVE AXIS: 13 DEGREES
VENTRICULAR RATE: 106 BPM

## 2018-06-05 PROCEDURE — 94760 N-INVAS EAR/PLS OXIMETRY 1: CPT

## 2018-06-05 PROCEDURE — 94640 AIRWAY INHALATION TREATMENT: CPT

## 2018-06-05 PROCEDURE — 59025 FETAL NON-STRESS TEST: CPT | Performed by: OBSTETRICS & GYNECOLOGY

## 2018-06-05 PROCEDURE — 99232 SBSQ HOSP IP/OBS MODERATE 35: CPT | Performed by: OBSTETRICS & GYNECOLOGY

## 2018-06-05 PROCEDURE — 94150 VITAL CAPACITY TEST: CPT

## 2018-06-05 PROCEDURE — 87070 CULTURE OTHR SPECIMN AEROBIC: CPT | Performed by: INTERNAL MEDICINE

## 2018-06-05 PROCEDURE — 99232 SBSQ HOSP IP/OBS MODERATE 35: CPT | Performed by: INTERNAL MEDICINE

## 2018-06-05 PROCEDURE — 93010 ELECTROCARDIOGRAM REPORT: CPT | Performed by: INTERNAL MEDICINE

## 2018-06-05 PROCEDURE — 87205 SMEAR GRAM STAIN: CPT | Performed by: INTERNAL MEDICINE

## 2018-06-05 RX ORDER — GUAIFENESIN 600 MG
600 TABLET, EXTENDED RELEASE 12 HR ORAL EVERY 12 HOURS PRN
Status: DISCONTINUED | OUTPATIENT
Start: 2018-06-05 | End: 2018-06-08 | Stop reason: HOSPADM

## 2018-06-05 RX ORDER — POTASSIUM CHLORIDE 20 MEQ/1
40 TABLET, EXTENDED RELEASE ORAL ONCE
Status: COMPLETED | OUTPATIENT
Start: 2018-06-06 | End: 2018-06-06

## 2018-06-05 RX ADMIN — Medication 1 TABLET: at 08:45

## 2018-06-05 RX ADMIN — LEVALBUTEROL HYDROCHLORIDE 1.25 MG: 1.25 SOLUTION, CONCENTRATE RESPIRATORY (INHALATION) at 13:24

## 2018-06-05 RX ADMIN — LORATADINE 10 MG: 10 TABLET ORAL at 08:45

## 2018-06-05 RX ADMIN — ACETAMINOPHEN 650 MG: 325 TABLET ORAL at 22:32

## 2018-06-05 RX ADMIN — FERROUS SULFATE TAB 325 MG (65 MG ELEMENTAL FE) 325 MG: 325 (65 FE) TAB at 17:02

## 2018-06-05 RX ADMIN — FERROUS SULFATE TAB 325 MG (65 MG ELEMENTAL FE) 325 MG: 325 (65 FE) TAB at 06:08

## 2018-06-05 RX ADMIN — METHYLPREDNISOLONE SODIUM SUCCINATE 40 MG: 40 INJECTION, POWDER, FOR SOLUTION INTRAMUSCULAR; INTRAVENOUS at 21:37

## 2018-06-05 RX ADMIN — SODIUM CHLORIDE, SODIUM LACTATE, POTASSIUM CHLORIDE, AND CALCIUM CHLORIDE 125 ML/HR: .6; .31; .03; .02 INJECTION, SOLUTION INTRAVENOUS at 05:32

## 2018-06-05 RX ADMIN — ISODIUM CHLORIDE 3 ML: 0.03 SOLUTION RESPIRATORY (INHALATION) at 13:24

## 2018-06-05 RX ADMIN — ISODIUM CHLORIDE 3 ML: 0.03 SOLUTION RESPIRATORY (INHALATION) at 07:27

## 2018-06-05 RX ADMIN — LEVALBUTEROL HYDROCHLORIDE 1.25 MG: 1.25 SOLUTION, CONCENTRATE RESPIRATORY (INHALATION) at 19:29

## 2018-06-05 RX ADMIN — ONDANSETRON 4 MG: 2 INJECTION INTRAMUSCULAR; INTRAVENOUS at 22:32

## 2018-06-05 RX ADMIN — LEVALBUTEROL HYDROCHLORIDE 1.25 MG: 1.25 SOLUTION, CONCENTRATE RESPIRATORY (INHALATION) at 07:27

## 2018-06-05 RX ADMIN — DOCUSATE SODIUM 100 MG: 100 CAPSULE, LIQUID FILLED ORAL at 17:02

## 2018-06-05 RX ADMIN — DOCUSATE SODIUM 100 MG: 100 CAPSULE, LIQUID FILLED ORAL at 08:45

## 2018-06-05 RX ADMIN — LEVALBUTEROL HYDROCHLORIDE 1.25 MG: 1.25 SOLUTION, CONCENTRATE RESPIRATORY (INHALATION) at 00:45

## 2018-06-05 RX ADMIN — METHYLPREDNISOLONE SODIUM SUCCINATE 40 MG: 40 INJECTION, POWDER, FOR SOLUTION INTRAMUSCULAR; INTRAVENOUS at 06:08

## 2018-06-05 RX ADMIN — BUDESONIDE 0.5 MG: 0.5 INHALANT RESPIRATORY (INHALATION) at 07:29

## 2018-06-05 RX ADMIN — GUAIFENESIN 600 MG: 600 TABLET, EXTENDED RELEASE ORAL at 10:23

## 2018-06-05 RX ADMIN — BUDESONIDE 0.5 MG: 0.5 INHALANT RESPIRATORY (INHALATION) at 19:29

## 2018-06-05 RX ADMIN — POTASSIUM CHLORIDE 40 MEQ: 1500 TABLET, EXTENDED RELEASE ORAL at 08:45

## 2018-06-05 RX ADMIN — ISODIUM CHLORIDE 3 ML: 0.03 SOLUTION RESPIRATORY (INHALATION) at 00:45

## 2018-06-05 RX ADMIN — METHYLPREDNISOLONE SODIUM SUCCINATE 40 MG: 40 INJECTION, POWDER, FOR SOLUTION INTRAMUSCULAR; INTRAVENOUS at 14:41

## 2018-06-05 RX ADMIN — FAMOTIDINE 20 MG: 10 INJECTION, SOLUTION INTRAVENOUS at 08:48

## 2018-06-05 NOTE — SOCIAL WORK
Cm reviewed patient  Patient not stable for discharge today  Lung exam showed bilateral expiratory wheezes but less than yesterday  Patient to get sputum cultures  IVF to possibly be discontinued  Cm following

## 2018-06-05 NOTE — PLAN OF CARE
DISCHARGE PLANNING     Discharge to home or other facility with appropriate resources Progressing        DISCHARGE PLANNING - CARE MANAGEMENT     Discharge to post-acute care or home with appropriate resources Progressing        Knowledge Deficit     Patient/family/caregiver demonstrates understanding of disease process, treatment plan, medications, and discharge instructions Progressing        PAIN - ADULT     Verbalizes/displays adequate comfort level or baseline comfort level Progressing        Potential for Falls     Patient will remain free of falls Progressing        RESPIRATORY - ADULT     Achieves optimal ventilation and oxygenation Progressing

## 2018-06-05 NOTE — CASE MANAGEMENT
Initial Clinical Review    Admission: Date/Time/Statement: 6/4/18 @ 1025  TRANSFER FROM [facility] TO [facility]     Orders Placed This Encounter   Procedures    Inpatient Admission     Standing Status:   Standing     Number of Occurrences:   1     Order Specific Question:   Admitting Physician     Answer:   Porfirio Davis     Order Specific Question:   Level of Care     Answer:   Med Surg [16]     Order Specific Question:   Estimated length of stay     Answer:   More than 2 Midnights     Order Specific Question:   Certification     Answer:   I certify that inpatient services are medically necessary for this patient for a duration of greater than two midnights  See H&P and MD Progress Notes for additional information about the patient's course of treatment  History of Illness: 39 yo [id number] at 34 5 p/w acute asthma exacerbation secondary to very poorly controlled, severely persistent asthma, now HD # 1 and stable      ED Vital Signs:   ED Triage Vitals   Temperature Pulse Respirations Blood Pressure SpO2   06/04/18 1047 06/04/18 1047 06/04/18 1047 06/04/18 1047 06/04/18 1047   99 2 °F (37 3 °C) (!) 131 20 119/74 95 %      Temp Source Heart Rate Source Patient Position - Orthostatic VS BP Location FiO2 (%)   06/04/18 1047 06/04/18 1630 06/04/18 1047 06/04/18 1047 --   Oral Monitor Sitting Left arm       Pain Score       06/04/18 1030       No Pain        Wt Readings from Last 1 Encounters:   06/04/18 90 9 kg (200 lb 4 8 oz)     Vital Signs (abnormal):   06/04/18 2332  98 6 °F (37 °C)   114  18  125/76  96 %  None (Room air)  Lying   06/04/18 1630  98 5 °F (36 9 °C)   120   24  139/85  96 %  None (Room air)  Lying   06/04/18 1200  --   120  --  --  97 %  --  --   Comment rows:   OBSERV: Pt sitting upright, leaning forward at 06/04/18 1105   06/04/18 1047  99 2 °F (37 3 °C)   131  20  119/74  95 %  None (Room air)  Sitting     PEAK FLOW  6/4 Pre - 250   Post - 300    6/5 Pre - 210   Post - 250  Inspiraotory wheezes bilat     Abnormal Labs:    K 3 0  Anion gap 14  BUN 4   WBC 12 06  RBC 3 59  H/H 9 7/30 2    Past Medical/Surgical History:     Diagnosis    Asthma    Migraine    PCOS (polycystic ovarian syndrome)    Encounter for supervision of normal pregnancy in multigravida in second trimester    History of pre-eclampsia in prior pregnancy, currently pregnant in second trimester    History of  delivery, currently pregnant in second trimester    Advanced maternal age in multigravida, second trimester    History of ectopic pregnancy     Resolved Ambulatory Problems     Diagnosis Date Noted    No Resolved Ambulatory Problems     Diagnosis    Anemia    Asthma    Meningitis    Migraines     Admitting Diagnosis: Asthma in adult, unspecified asthma severity, with status asthmaticus [J45 902]    Age/Sex: 40 y o  female    Assessment/Plan:   39 yo [id number] at 27 8 IUP with acute asthma exacerbation secondary to very poorly controlled, severely persistent asthma, now HD # 1 and stable      PLAN:  Acute asthma exacerbation:               Albuterol neb prn              Start symbicort              Resume prednisone 20 mg q daily              pulseox 97 @ 11:45 am, maternal  bpm (just had neb tx), RR 28              Eosinophilia: 0% on CBC w/diff              Peak flow q6h              Continuous pulseox  Seasonal allergies: zyrtec  GI PPx: tums, pepcid  h/o multiple hospitalizations for asthma: pulmonary c/s  Anemia: Hb 9 8; start iron bid w/colace prn  IUP: prenatal vitamin, cEFM, external toco   Hypokalemia: K+ 3 0: s/p K-Dur 40; repeat again in am  Diet: regular  Pain mngmt: tylenol, aqua k  IV fluids: LR   DVT px: OOB, SCDs  Dispo: inpt until stabilized    Admission Orders:  Scheduled Meds:   Current Facility-Administered Medications:  acetaminophen 650 mg Oral Q6H PRN   albuterol 5 mg Nebulization Q4H PRN   budesonide 0 5 mg Nebulization Q12H   calcium carbonate 1,000 mg Oral TID PRN   docusate sodium 100 mg Oral BID   famotidine 20 mg Intravenous Q24H FAVIOLA   ferrous sulfate 325 mg Oral BID AC   guaiFENesin 600 mg Oral Q12H PRN   levalbuterol 1 25 mg Nebulization Q6H   loratadine 10 mg Oral Daily   methylPREDNISolone sodium succinate 40 mg Intravenous Q8H [address]   ondansetron 4 mg Intravenous Q6H PRN   [START ON 6/6/2018] potassium chloride 40 mEq Oral Once   prenatal multivitamin 1 tablet Oral Daily   sodium chloride 3 mL Nebulization Q6H     Continuous Infusions:  IV LR @ 125 ml/hr from admission to 6/5 @ 0845  PRN Meds:   Acetaminophen x2    Albuterol x1    calcium carbonate    guaiFENesin x1    ondansetron    SCDs  Sputum C&S   Diet regular  Cons Pulmonology   Cons Perinatology   Peak flow q 6 hr   Fetal nonstress test q shift  _______________________  6/4 Maternal Fetal Medicine Consult   41 yo [id number] at 27 8 p/w acute asthma exacerbation secondary to very poorly controlled, severely persistent asthma, now HD # 1 and stable  PLAN:   Acute asthma exacerbation:               Albuterol neb prn              Start symbicort              Resume prednisone 20 mg q daily              pulseox 97 @ 11:45 am, maternal  bpm (just had neb tx), RR 28              Eosinophilia: 0% on CBC w/diff              Peak flow q6h              Continuous pulseox  Seasonal allergies: zyrtec  GI PPx: tums, pepcid  h/o 30 hospitalizations for asthma: pulmonary c/s  Anemia: Hb 9 8; start iron bid w/colace prn  IUP: prenatal vitamin, cEFM, external toco   Hypokalemia: K+ 3 0: s/p K-Dur 40; repeat again in am  Diet: regular  Pain mngmt: tylenol, aqua k  IV fluids: LR   DVT px: OOB, SCDs   Dispo: inpt until stabilized  ____________________  6/4 Pulmonary Consult   1  Severe persistent asthma with acute exacerbation        *  Increase steroids to solumedrol 40mg Q8hrs        *  D/C symbicort and start pulmicort Q12hrs        *  Continue Xopenex Q6hrs    Stop Atrovent        *  Will need outpatient PFTs as well as RAST/IgE level once post partum  May be candidate for injectable therapy  2  Allergic rhinitis        *  Continue Claritin        *  May need outpatient referral to allergist pending results of above testing  3  GERD        *  Continue IV pepcid per primary team        *  Likely major contributing factor to worsening asthma     ~Will need [facility] once baby is delivered and PFTs/blood work can be done  __________________  6/5 Maternal Fetal Medicine Progress Note  Good fetal movement   Time of NST #1: 0600  NST: reactive   Variability: Moderate 6-25 bpm  Accelerations: 15 bpm x 15 secs or greater   Decelerations: none   Contraction Frequency (minutes): 3 over 60 mins  ________________  6/5 Pulmonary Progress Note  1  Severe persistent asthma with acute exacerbation        *  Continue solumedrol 40mg Q8hrs & hope to decrease in AM        *  Continue pulmicort Q12hrs & Xopenex Q6hrs        *  Will need outpatient PFTs as well as RAST/IgE level once post partum   May be candidate for injectable therapy  2  Allergic rhinitis        *  Continue Claritin        *  May need outpatient referral to allergist pending results of above testing  3    GERD        *  Continue IV pepcid per primary team        *  Likely major contributing factor to worsening asthma     ~Check sputum culture      Thank you,  [facility] in the [facility] by [address] for 2017  Network Utilization Review Department  Phone: [phone]; Fax [phone]  ATTENTION: The Network Utilization Review Department is now centralized for our 7 Facilities  Make a note that we have a new phone and fax numbers for our Department  Please call with any questions or concerns to [phone] and carefully follow the prompts so that you are directed to the right person   All voicemails are confidential  Fax any determinations, approvals, denials, and requests for initial or continue stay review clinical to [phone]  Due to HIGH CALL volume, it would be easier if you could please send faxed requests to expedite your requests and in part, help us provide discharge notifications faster

## 2018-06-05 NOTE — PLAN OF CARE
DISCHARGE PLANNING     Discharge to home or other facility with appropriate resources Progressing        DISCHARGE PLANNING - CARE MANAGEMENT     Discharge to post-acute care or home with appropriate resources Progressing        Knowledge Deficit     Patient/family/caregiver demonstrates understanding of disease process, treatment plan, medications, and discharge instructions Progressing        PAIN - ADULT     Verbalizes/displays adequate comfort level or baseline comfort level Progressing        RESPIRATORY - ADULT     Achieves optimal ventilation and oxygenation Progressing

## 2018-06-05 NOTE — PROGRESS NOTES
Antepartum Maternal-Fetal Medicine Physician Progress Note   Artist Martha 40 y o  female MRN: [id number]  Unit/Bed#: [facility] 815-01 Encounter: [id number]      ASSESSMENT:  41 yo [id number] at 27 10 p/w acute asthma exacerbation secondary to very poorly controlled, severely persistent asthma, now HD # 2 and stable      PLAN:  Acute asthma exacerbation:               Albuterol neb prn              Start pulmicort on 6/4/18              Stop prednisone 20 mg q daily;    Start solumedrol 40 q8h              Eosinophilia: 0% on CBC w/diff              Peak flow q6h              Continuous pulseox  Seasonal allergies: stop zyrtec; start claritin  GI PPx: tums, pepcid  h/o multiple hospitalizations for asthma: pulmonary c/s  Anemia: Hb 9 8; start iron bid w/colace prn  IUP: prenatal vitamin, NST q shift  Hypokalemia: K+ 3 0: s/p K-Dur 40; repeat again this am; f/u am BMP  FEN: regular, LR  Pain mngmt: tylenol, aqua k  DVT px: OOB, SCDs  Dispo: inpt until stabilized    SUBJECTIVE:    Pain: 0/10  Tolerating Oral Intake: yes  Voiding: yes - spontaneously  Flatus: yes  Bowel Movement: no  Ambulating: yes  Chest Pain: no  Leg Pain/Discomfort: no    Vaginal Bleeding: no  Leaking of Fluid: no  Contractions: no  Fetal Movement: yes    (asthma)  coughing: yes  wheezing: yes  shortness of breath: yes      OBJECTIVE:     Vitals:   Patient Vitals for the past 24 hrs:   BP Temp Temp src Pulse Resp SpO2 Height Weight   06/05/18 0629 137/82 98 6 °F (37 °C) Oral 100 22 95 % - -   06/05/18 0045 - - - - - 97 % - -   06/04/18 2332 125/76 98 6 °F (37 °C) Oral (!) 114 18 96 % - -   06/04/18 1931 - - - - - 97 % - -   06/04/18 1630 139/85 98 5 °F (36 9 °C) Oral (!) 120 (!) 24 96 % - -   06/04/18 1552 - - - 99 - 97 % - -   06/04/18 1423 - - - - - 97 % - -   06/04/18 1200 - - - (!) 120 - 97 % - -   06/04/18 1047 119/74 99 2 °F (37 3 °C) Oral (!) 131 20 95 % 5' 2" (1 575 m) 90 9 kg (200 lb 4 8 oz)       I/O       06/03 0701 - 06/04 0700 06/04 0701 - 06/05 0700    P  O   855    I V  (mL/kg)  1966 7 (21 6)    Total Intake(mL/kg)  2821 7 (31)    Urine (mL/kg/hr)  1150    Stool  0    Total Output   1150    Net   +1671 7          Unmeasured Urine Occurrence  0 x    Unmeasured Stool Occurrence  0 x            Results from last 7 days  Lab Units 06/04/18  0538   WBC Thousand/uL 12 06*   NEUTROS ABS Thousands/µL 9 24*   HEMOGLOBIN g/dL 9 7*   MCV fL 84   PLATELETS Thousands/uL 222       Results from last 7 days  Lab Units 06/04/18  0538   NEUTROS PCT % 76*   MONOS PCT % 8   EOS PCT % 0           Results from last 7 days  Lab Units 06/04/18  0538   SODIUM mmol/L 141   POTASSIUM mmol/L 3 0*   CHLORIDE mmol/L 106   CO2 mmol/L 21   BUN mg/dL 4*   CREATININE mg/dL 0 81             Results from last 7 days  Lab Units 06/04/18  1906   RH FACTOR  Positive       There is no immunization history on file for this patient  Physical exam:  General: No Acute Distress  Cardiovascular: Tachycardia, Regular rhythm  Lungs:  Inspiratory and expiratory wheezes Bilaterally, expiratory rhonchi bilaterally  Abdomen: Soft,  non-tender gravid uterus  Lower Extremities: negative Lurdes's sign bilaterally    Non-stress Tests in Trailing 24 hours:    Time of NST #1: 0600  NST: reactive   Variability: Moderate 6-25 bpm  Accelerations: 15 bpm x 15 secs or greater   Decelerations: none   Contraction Frequency (minutes): 3 over 60 mins      MEDS:   Medication Administration - last 24 hours from 06/04/2018 0643 to 06/05/2018 9486       Date/Time Order Dose Route Action Action by     06/04/2018 1346 predniSONE tablet 20 mg 20 mg Oral Given Lele Clark, RN     06/04/2018 1656 budesonide-formoterol (SYMBICORT) 160-4 5 mcg/act inhaler 2 puff 2 puff Inhalation Not Given Ritu Ryan, RADHA     06/04/2018 1421 albuterol inhalation solution 5 mg 5 mg Nebulization Given Kimberli Chan, RT     06/04/2018 1422 ipratropium (ATROVENT) 0 02 % inhalation solution 0 5 mg 0 5 mg Nebulization Given Soo Cerna RT     06/04/2018 1657 prenatal multivitamin tablet 1 tablet 1 tablet Oral Given Eboni Dickinson, RN     06/05/2018 0532 lactated ringers infusion 125 mL/hr Intravenous [address] Santiago Baker, RN     06/05/2018 1384 lactated ringers infusion 0 mL/hr Intravenous [address] Allyssa Rico, RN     06/04/2018 2207 lactated ringers infusion 125 mL/hr Intravenous [address] Otf Fernandes, RN     06/04/2018 2206 lactated ringers infusion 0 mL/hr Intravenous Stopped Katina Teague, RN     06/04/2018 1346 lactated ringers infusion 125 mL/hr Intravenous David Carlyn [address], RN     06/04/2018 2111 acetaminophen (TYLENOL) tablet 650 mg 650 mg Oral Given Eboni Dickinson, RN     06/04/2018 1346 acetaminophen (TYLENOL) tablet 650 mg 650 mg Oral Given Damian Conrad, RN     06/05/2018 6049 ferrous sulfate tablet 325 mg 325 mg Oral Given Santiago Baker, RN     06/04/2018 1656 ferrous sulfate tablet 325 mg 325 mg Oral Given Eboni Diciknson, RN     06/04/2018 1854 docusate sodium (COLACE) capsule 100 mg 100 mg Oral Not Given Eboni Dickinson, RN     06/04/2018 1329 docusate sodium (COLACE) capsule 100 mg 100 mg Oral Not Given Damian Conrad, RN     06/04/2018 1656 loratadine (CLARITIN) tablet 10 mg 10 mg Oral Given Eboni Dickinson, RN     06/04/2018 1855 famotidine (PEPCID) injection 20 mg 20 mg Intravenous Given Trinidad Dale, RN     06/05/2018 0045 levalbuterol (Norvel Peaks) inhalation solution 1 25 mg 1 25 mg Nebulization Given Samir Douglas, RT     06/04/2018 1931 levalbuterol (Norvel Peaks) inhalation solution 1 25 mg 1 25 mg Nebulization Given Papa Stoll, RT     06/04/2018 1702 levalbuterol (XOPENEX) inhalation solution 1 25 mg 1 25 mg Nebulization Not Given Kimberli Chan, RT     06/04/2018 1931 budesonide (PULMICORT) inhalation solution 0 5 mg 0 5 mg Nebulization Given Papa Stoll, RT     06/05/2018 8351 methylPREDNISolone sodium succinate (Solu-MEDROL) injection 40 mg 40 mg Intravenous Given Santiago Baker RN     06/04/2018 2323 methylPREDNISolone sodium succinate (Solu-MEDROL) injection 40 mg 40 mg Intravenous Given Brenda Rivera RN     06/04/2018 1700 methylPREDNISolone sodium succinate (Solu-MEDROL) injection 40 mg 40 mg Intravenous Given Trinidad Dale RN     06/05/2018 0045 sodium chloride 0 9 % inhalation solution 3 mL 3 mL Nebulization Given Herberth Vicente,      06/04/2018 1933 sodium chloride 0 9 % inhalation solution 3 mL 3 mL Nebulization Not Given Herberth Vicente, RT        Current Facility-Administered Medications   Medication Dose Route Frequency    acetaminophen (TYLENOL) tablet 650 mg  650 mg Oral Q6H PRN    albuterol inhalation solution 5 mg  5 mg Nebulization Q4H PRN    budesonide (PULMICORT) inhalation solution 0 5 mg  0 5 mg Nebulization Q12H    calcium carbonate (TUMS) chewable tablet 1,000 mg  1,000 mg Oral TID PRN    docusate sodium (COLACE) capsule 100 mg  100 mg Oral BID    famotidine (PEPCID) injection 20 mg  20 mg Intravenous Q24H FAVIOLA    ferrous sulfate tablet 325 mg  325 mg Oral BID AC    lactated ringers infusion  125 mL/hr Intravenous Continuous    levalbuterol (XOPENEX) inhalation solution 1 25 mg  1 25 mg Nebulization Q6H    loratadine (CLARITIN) tablet 10 mg  10 mg Oral Daily    methylPREDNISolone sodium succinate (Solu-MEDROL) injection 40 mg  40 mg Intravenous Q8H [address]    ondansetron (ZOFRAN) injection 4 mg  4 mg Intravenous Q6H PRN    potassium chloride (K-DUR,KLOR-CON) CR tablet 40 mEq  40 mEq Oral Once    prenatal multivitamin tablet 1 tablet  1 tablet Oral Daily    sodium chloride 0 9 % inhalation solution 3 mL  3 mL Nebulization Q6H     Invasive Devices     Peripheral Intravenous Line            Peripheral IV 06/04/18 Right Antecubital 1 day                        Signature / Title: Skyler Buckley MD, Resident - Ob/Gyn    Date: 6/5/2018  Time: 6:43 AM

## 2018-06-05 NOTE — PROGRESS NOTES
Progress Note - Pulmonary   Saul Monzon 40 y o  female MRN: [id number]  Unit/Bed#: [facility] 815-01 Encounter: [id number]      Assessment/Plan:  1  Severe persistent asthma with acute exacerbation        *  Continue solumedrol 40mg Q8hrs & hope to decrease in AM        *  Continue pulmicort Q12hrs & Xopenex Q6hrs        *  Will need outpatient PFTs as well as RAST/IgE level once post partum  May be candidate for injectable therapy  2  Allergic rhinitis        *  Continue Claritin        *  May need outpatient referral to allergist pending results of above testing  3  GERD        *  Continue IV pepcid per primary team        *  Likely major contributing factor to worsening asthma    ~Check sputum culture  ~Ok to D/C IVF from our standpoint  ~Discussed with RN at bedside      Subjective:   Ms Wilder Freeman is seen sitting on edge of bed this morning  She is feeling a bit better with decreased shortness of breath and bronchospasm  She continues to have cough and notes that earlier this morning it was greenish in color  She has been afebrile  She is able to ambulate in the room a bit more  She is eating and drinking fine  She wishes to pursue pulmonary follow-up in our [place] office  Objective:     Vitals: Blood pressure 137/82, pulse 100, temperature 98 6 °F (37 °C), temperature source Oral, resp  rate 22, height 5' 2" (1 575 m), weight 90 9 kg (200 lb 4 8 oz), SpO2 97 %, unknown if currently breastfeeding , RA, Body mass index is 36 64 kg/m²        Intake/Output Summary (Last 24 hours) at 06/05/18 0835  Last data filed at 06/05/18 0531   Gross per 24 hour   Intake          2821 67 ml   Output             1150 ml   Net          1671 67 ml         Physical Exam  Gen: Awake, alert, oriented x 3, no acute distress  HEENT: Mucous membranes moist, no oral lesions, no thrush  NECK: No accessory muscle use, JVP not elevated  Cardiac: Regular, single S1, single S2, tachycardic, no murmurs, no rubs, no gallops  Lungs: Decreased breath sounds throughout bilaterally with scattered expiratory wheezes, most prominent in MERLY at present  No rales or rhonchi noted  Abdomen: + 34 5 wk IUP, normoactive bowel sounds, soft nontender, nondistended, no rebound or rigidity, no guarding  Extremities: no cyanosis, no clubbing, no edema    Labs: I have personally reviewed pertinent lab results  , ABG: No results found for: PHART, ZZY2CAV, PO2ART, WSP1RGM, T6DELKSS, BEART, SOURCE, BNP: No results found for: BNP, CBC: No results found for: WBC, HGB, HCT, MCV, PLT, ADJUSTEDWBC, MCH, MCHC, RDW, MPV, NRBC, CMP: No results found for: NA, K, CL, CO2, ANIONGAP, BUN, CREATININE, GLUCOSE, CALCIUM, AST, ALT, ALKPHOS, PROT, ALBUMIN, BILITOT, EGFR, PT/INR: No results found for: PT, INR, Troponin: No results found for: TROPONINI     Imaging and other studies: I have personally reviewed pertinent films in PACS    No new pulmonary imaging in system to be reviewed    Paresh Vasquez PA-C

## 2018-06-06 LAB
ANION GAP SERPL CALCULATED.3IONS-SCNC: 8 MMOL/L (ref 4–13)
BUN SERPL-MCNC: 5 MG/DL (ref 5–25)
CALCIUM SERPL-MCNC: 8.7 MG/DL (ref 8.3–10.1)
CHLORIDE SERPL-SCNC: 109 MMOL/L (ref 100–108)
CO2 SERPL-SCNC: 23 MMOL/L (ref 21–32)
CREAT SERPL-MCNC: 0.69 MG/DL (ref 0.6–1.3)
GFR SERPL CREATININE-BSD FRML MDRD: 130 ML/MIN/1.73SQ M
GLUCOSE SERPL-MCNC: 100 MG/DL (ref 65–140)
POTASSIUM SERPL-SCNC: 4 MMOL/L (ref 3.5–5.3)
SODIUM SERPL-SCNC: 140 MMOL/L (ref 136–145)

## 2018-06-06 PROCEDURE — 94150 VITAL CAPACITY TEST: CPT

## 2018-06-06 PROCEDURE — 94760 N-INVAS EAR/PLS OXIMETRY 1: CPT

## 2018-06-06 PROCEDURE — 59025 FETAL NON-STRESS TEST: CPT | Performed by: OBSTETRICS & GYNECOLOGY

## 2018-06-06 PROCEDURE — 99232 SBSQ HOSP IP/OBS MODERATE 35: CPT | Performed by: INTERNAL MEDICINE

## 2018-06-06 PROCEDURE — 99232 SBSQ HOSP IP/OBS MODERATE 35: CPT | Performed by: OBSTETRICS & GYNECOLOGY

## 2018-06-06 PROCEDURE — 80048 BASIC METABOLIC PNL TOTAL CA: CPT | Performed by: OBSTETRICS & GYNECOLOGY

## 2018-06-06 PROCEDURE — 94668 MNPJ CHEST WALL SBSQ: CPT

## 2018-06-06 PROCEDURE — 94640 AIRWAY INHALATION TREATMENT: CPT

## 2018-06-06 PROCEDURE — 94664 DEMO&/EVAL PT USE INHALER: CPT

## 2018-06-06 RX ADMIN — FERROUS SULFATE TAB 325 MG (65 MG ELEMENTAL FE) 325 MG: 325 (65 FE) TAB at 06:09

## 2018-06-06 RX ADMIN — METHYLPREDNISOLONE SODIUM SUCCINATE 40 MG: 40 INJECTION, POWDER, FOR SOLUTION INTRAMUSCULAR; INTRAVENOUS at 06:09

## 2018-06-06 RX ADMIN — ISODIUM CHLORIDE 3 ML: 0.03 SOLUTION RESPIRATORY (INHALATION) at 07:22

## 2018-06-06 RX ADMIN — BUDESONIDE 0.5 MG: 0.5 INHALANT RESPIRATORY (INHALATION) at 19:23

## 2018-06-06 RX ADMIN — METHYLPREDNISOLONE SODIUM SUCCINATE 40 MG: 40 INJECTION, POWDER, FOR SOLUTION INTRAMUSCULAR; INTRAVENOUS at 22:41

## 2018-06-06 RX ADMIN — LEVALBUTEROL HYDROCHLORIDE 1.25 MG: 1.25 SOLUTION, CONCENTRATE RESPIRATORY (INHALATION) at 13:51

## 2018-06-06 RX ADMIN — LEVALBUTEROL HYDROCHLORIDE 1.25 MG: 1.25 SOLUTION, CONCENTRATE RESPIRATORY (INHALATION) at 07:22

## 2018-06-06 RX ADMIN — FERROUS SULFATE TAB 325 MG (65 MG ELEMENTAL FE) 325 MG: 325 (65 FE) TAB at 16:57

## 2018-06-06 RX ADMIN — BUDESONIDE 0.5 MG: 0.5 INHALANT RESPIRATORY (INHALATION) at 07:22

## 2018-06-06 RX ADMIN — METHYLPREDNISOLONE SODIUM SUCCINATE 40 MG: 40 INJECTION, POWDER, FOR SOLUTION INTRAMUSCULAR; INTRAVENOUS at 14:38

## 2018-06-06 RX ADMIN — LORATADINE 10 MG: 10 TABLET ORAL at 08:21

## 2018-06-06 RX ADMIN — ISODIUM CHLORIDE 3 ML: 0.03 SOLUTION RESPIRATORY (INHALATION) at 01:27

## 2018-06-06 RX ADMIN — LEVALBUTEROL HYDROCHLORIDE 1.25 MG: 1.25 SOLUTION, CONCENTRATE RESPIRATORY (INHALATION) at 19:23

## 2018-06-06 RX ADMIN — POTASSIUM CHLORIDE 40 MEQ: 1500 TABLET, EXTENDED RELEASE ORAL at 08:21

## 2018-06-06 RX ADMIN — ACETAMINOPHEN 650 MG: 325 TABLET ORAL at 09:44

## 2018-06-06 RX ADMIN — DOCUSATE SODIUM 100 MG: 100 CAPSULE, LIQUID FILLED ORAL at 16:57

## 2018-06-06 RX ADMIN — FAMOTIDINE 20 MG: 10 INJECTION, SOLUTION INTRAVENOUS at 08:23

## 2018-06-06 RX ADMIN — ACETAMINOPHEN 650 MG: 325 TABLET ORAL at 16:57

## 2018-06-06 RX ADMIN — ISODIUM CHLORIDE 3 ML: 0.03 SOLUTION RESPIRATORY (INHALATION) at 13:51

## 2018-06-06 RX ADMIN — LEVALBUTEROL HYDROCHLORIDE 1.25 MG: 1.25 SOLUTION, CONCENTRATE RESPIRATORY (INHALATION) at 01:27

## 2018-06-06 RX ADMIN — Medication 1 TABLET: at 08:21

## 2018-06-06 RX ADMIN — DOCUSATE SODIUM 100 MG: 100 CAPSULE, LIQUID FILLED ORAL at 08:21

## 2018-06-06 NOTE — PLAN OF CARE
DISCHARGE PLANNING     Discharge to home or other facility with appropriate resources Not Progressing        DISCHARGE PLANNING - CARE MANAGEMENT     Discharge to post-acute care or home with appropriate resources Not Progressing        Knowledge Deficit     Patient/family/caregiver demonstrates understanding of disease process, treatment plan, medications, and discharge instructions Not Progressing        PAIN - ADULT     Verbalizes/displays adequate comfort level or baseline comfort level Not Progressing        Potential for Falls     Patient will remain free of falls Not Progressing        RESPIRATORY - ADULT     Achieves optimal ventilation and oxygenation Not Progressing

## 2018-06-06 NOTE — PROGRESS NOTES
Progress Note - Pulmonary   Lyle Peraza 40 y o  female MRN: [id number]  Unit/Bed#: [facility] 815-01 Encounter: [id number]      Assessment/Plan:  1  Severe persistent asthma with acute exacerbation        *  Continue solumedrol 40mg Q8hrs         *  Continue pulmicort Q12hrs & Xopenex Q6hrs        *  Will need outpatient PFTs as well as RAST/IgE level once post partum   May be candidate for injectable therapy        *  Needs OOB, increase activity  Incentive spirometry Q1hr  2  Allergic rhinitis        *  Continue Claritin        *  May need outpatient referral to allergist pending results of above testing  3    GERD        *  Continue IV pepcid per primary team        *  Likely major contributing factor to worsening asthma       ~Await sputum culture  ~Discussed with RT --> add flutter valve  ~Outpatient pulmonary follow up in our [facility] office as per discharge instructions    Subjective:   Ms Tiera Escobar is seen bed eating breakfast this morning  She tells me her breathing is about the same    She does not feel much different than on admission  She still has considerable dyspnea on exertion  While showering, she needed to stop multiple times to catch her breath  She has harsh cough with occasional clear to yellow to green sputum production  She denies hemoptysis  She has been afebrile  She denies resting shortness of breath or chest pain  She has been walking in her room but has not been out to the hallways  Objective:     Vitals: Blood pressure 105/57, pulse 80, temperature 98 1 °F (36 7 °C), temperature source Oral, resp  rate 18, height 5' 2" (1 575 m), weight 90 9 kg (200 lb 4 8 oz), SpO2 97 %, unknown if currently breastfeeding , RA, Body mass index is 36 64 kg/m²        Intake/Output Summary (Last 24 hours) at 06/06/18 0914  Last data filed at 06/06/18 0508   Gross per 24 hour   Intake             1000 ml   Output             1900 ml   Net             -900 ml         Physical Exam  Gen: Awake, alert, oriented x 3, no acute distress  HEENT: Mucous membranes moist, no oral lesions, no thrush  NECK: No accessory muscle use, JVP not elevated  Cardiac: Regular, single S1, single S2, no murmurs, no rubs, no gallops  Lungs:  Decreased breath sounds throughout bilaterally  There are scattered inspiratory and expiratory wheezes throughout  No rales or rhonchi noted  Abdomen: normoactive bowel sounds, soft nontender, nondistended, no rebound or rigidity, no guarding  +35wk IUP  Extremities: no cyanosis, no clubbing, no edema    Labs: I have personally reviewed pertinent lab results  , ABG: No results found for: PHART, MNQ8HOJ, PO2ART, MVY4HKM, W8UHDCZF, BEART, SOURCE, BNP: No results found for: BNP, CBC: No results found for: WBC, HGB, HCT, MCV, PLT, ADJUSTEDWBC, MCH, MCHC, RDW, MPV, NRBC, CMP:   Lab Results   Component Value Date     06/06/2018    K 4 0 06/06/2018     (H) 06/06/2018    CO2 23 06/06/2018    ANIONGAP 8 06/06/2018    BUN 5 06/06/2018    CREATININE 0 69 06/06/2018    GLUCOSE 100 06/06/2018    CALCIUM 8 7 06/06/2018    EGFR 130 06/06/2018   , PT/INR: No results found for: PT, INR, Troponin: No results found for: TROPONINI     Sputum culture pending    Imaging and other studies: I have personally reviewed pertinent films in PACS    No new pulmonary imaging in system to be reviewed      Tesfaye Cowan PA-C

## 2018-06-07 PROCEDURE — 94760 N-INVAS EAR/PLS OXIMETRY 1: CPT

## 2018-06-07 PROCEDURE — 99232 SBSQ HOSP IP/OBS MODERATE 35: CPT | Performed by: INTERNAL MEDICINE

## 2018-06-07 PROCEDURE — 94668 MNPJ CHEST WALL SBSQ: CPT

## 2018-06-07 PROCEDURE — 99231 SBSQ HOSP IP/OBS SF/LOW 25: CPT | Performed by: OBSTETRICS & GYNECOLOGY

## 2018-06-07 PROCEDURE — 94150 VITAL CAPACITY TEST: CPT

## 2018-06-07 PROCEDURE — 94640 AIRWAY INHALATION TREATMENT: CPT

## 2018-06-07 RX ORDER — BUDESONIDE AND FORMOTEROL FUMARATE DIHYDRATE 160; 4.5 UG/1; UG/1
2 AEROSOL RESPIRATORY (INHALATION) 2 TIMES DAILY
Status: DISCONTINUED | OUTPATIENT
Start: 2018-06-07 | End: 2018-06-08 | Stop reason: HOSPADM

## 2018-06-07 RX ORDER — ALBUTEROL SULFATE 90 UG/1
2 AEROSOL, METERED RESPIRATORY (INHALATION) EVERY 4 HOURS PRN
Status: DISCONTINUED | OUTPATIENT
Start: 2018-06-07 | End: 2018-06-08 | Stop reason: HOSPADM

## 2018-06-07 RX ORDER — METHYLPREDNISOLONE SODIUM SUCCINATE 40 MG/ML
40 INJECTION, POWDER, LYOPHILIZED, FOR SOLUTION INTRAMUSCULAR; INTRAVENOUS EVERY 12 HOURS SCHEDULED
Status: DISCONTINUED | OUTPATIENT
Start: 2018-06-07 | End: 2018-06-08

## 2018-06-07 RX ADMIN — ISODIUM CHLORIDE 3 ML: 0.03 SOLUTION RESPIRATORY (INHALATION) at 13:22

## 2018-06-07 RX ADMIN — DOCUSATE SODIUM 100 MG: 100 CAPSULE, LIQUID FILLED ORAL at 08:07

## 2018-06-07 RX ADMIN — BUDESONIDE 0.5 MG: 0.5 INHALANT RESPIRATORY (INHALATION) at 07:19

## 2018-06-07 RX ADMIN — BUDESONIDE AND FORMOTEROL FUMARATE DIHYDRATE 2 PUFF: 160; 4.5 AEROSOL RESPIRATORY (INHALATION) at 21:01

## 2018-06-07 RX ADMIN — FERROUS SULFATE TAB 325 MG (65 MG ELEMENTAL FE) 325 MG: 325 (65 FE) TAB at 06:12

## 2018-06-07 RX ADMIN — ACETAMINOPHEN 650 MG: 325 TABLET ORAL at 16:54

## 2018-06-07 RX ADMIN — LEVALBUTEROL HYDROCHLORIDE 1.25 MG: 1.25 SOLUTION, CONCENTRATE RESPIRATORY (INHALATION) at 00:46

## 2018-06-07 RX ADMIN — ISODIUM CHLORIDE 3 ML: 0.03 SOLUTION RESPIRATORY (INHALATION) at 07:19

## 2018-06-07 RX ADMIN — LEVALBUTEROL HYDROCHLORIDE 1.25 MG: 1.25 SOLUTION, CONCENTRATE RESPIRATORY (INHALATION) at 07:19

## 2018-06-07 RX ADMIN — LEVALBUTEROL HYDROCHLORIDE 1.25 MG: 1.25 SOLUTION, CONCENTRATE RESPIRATORY (INHALATION) at 13:22

## 2018-06-07 RX ADMIN — BUDESONIDE AND FORMOTEROL FUMARATE DIHYDRATE 2 PUFF: 160; 4.5 AEROSOL RESPIRATORY (INHALATION) at 14:21

## 2018-06-07 RX ADMIN — METHYLPREDNISOLONE SODIUM SUCCINATE 40 MG: 40 INJECTION, POWDER, FOR SOLUTION INTRAMUSCULAR; INTRAVENOUS at 06:12

## 2018-06-07 RX ADMIN — Medication 1 TABLET: at 08:07

## 2018-06-07 RX ADMIN — METHYLPREDNISOLONE SODIUM SUCCINATE 40 MG: 40 INJECTION, POWDER, FOR SOLUTION INTRAMUSCULAR; INTRAVENOUS at 21:00

## 2018-06-07 RX ADMIN — LORATADINE 10 MG: 10 TABLET ORAL at 08:07

## 2018-06-07 RX ADMIN — FERROUS SULFATE TAB 325 MG (65 MG ELEMENTAL FE) 325 MG: 325 (65 FE) TAB at 16:55

## 2018-06-07 RX ADMIN — ISODIUM CHLORIDE 3 ML: 0.03 SOLUTION RESPIRATORY (INHALATION) at 00:46

## 2018-06-07 RX ADMIN — FAMOTIDINE 20 MG: 10 INJECTION, SOLUTION INTRAVENOUS at 08:07

## 2018-06-07 RX ADMIN — ACETAMINOPHEN 650 MG: 325 TABLET ORAL at 10:11

## 2018-06-07 RX ADMIN — DOCUSATE SODIUM 100 MG: 100 CAPSULE, LIQUID FILLED ORAL at 16:55

## 2018-06-07 NOTE — PROGRESS NOTES
Progress Note - Pulmonary   Angie Bath 40 y o  female MRN: [id number]  Unit/Bed#: [facility] 815-01 Encounter: [id number]      Assessment:  1  Severe persistent asthma with acute exacerbation-improving  2  Allergic rhinitis  3  GERD    Plan:  1  Solu-Medrol was weaned to 40 mg q 12 today  Would discharge on slow prednisone taper  2   Continue Pulmicort q 12 hours and Xopenex Q 6   3   Out of bed and increase activity as tolerated  4   Incentive spirometry  5  Continue Claritin  6  Okay for discharge from pulmonary standpoint with slow prednisone taper  Patient has her own pulmonologist who she states she will likely follow up with however if she wishes to see us information is in discharge instructions  Needs outpatient PFT as well as IgE/RAST testing once postpartum  May be candidate for injectable therapy  7   Sputum culture pending    Subjective:   Patient sitting up in bed appears comfortable states her breathing has improved quite significantly since admission  Asking to go home  Positive cough with some green sputum  No dyspnea at rest   Denies fever, chills, headache, chest pain, abdominal pain, nausea, vomiting, hemoptysis  ROS otherwise negative or noncontributory    Objective:       Vitals: Blood pressure 128/88, pulse (!) 110, temperature 98 7 °F (37 1 °C), temperature source Oral, resp  rate 18, height 5' 2" (1 575 m), weight 90 9 kg (200 lb 4 8 oz), SpO2 95 %, unknown if currently breastfeeding  , room-air, Body mass index is 36 64 kg/m²        Intake/Output Summary (Last 24 hours) at 06/07/18 1213  Last data filed at 06/07/18 0940   Gross per 24 hour   Intake              585 ml   Output              625 ml   Net              -40 ml         Physical Exam  Gen: Awake, alert, oriented x 3, no acute distress  HEENT: Mucous membranes moist, no oral lesions, no thrush  NECK: No accessory muscle use, JVP not elevated  Cardiac: Regular, single S1, single S2, no murmurs, no rubs, no gallops  Lungs:  Mild expiratory wheeze add  Abdomen: normoactive bowel sounds, soft nontender, nondistended, no rebound or rigidity, no guarding  Thirty-five weeks IUP  Extremities: no cyanosis, no clubbing, no edema    Labs: I have personally reviewed pertinent lab results  , ABG: No results found for: PHART, QFC6LTZ, PO2ART, GII9QMN, M3LJESGE, BEART, SOURCE, BNP: No results found for: BNP, CBC: No results found for: WBC, HGB, HCT, MCV, PLT, ADJUSTEDWBC, MCH, MCHC, RDW, MPV, NRBC, CMP: No results found for: NA, K, CL, CO2, ANIONGAP, BUN, CREATININE, GLUCOSE, CALCIUM, AST, ALT, ALKPHOS, PROT, ALBUMIN, BILITOT, EGFR, PT/INR: No results found for: PT, INR, Troponin: No results found for: TROPONINI  Imaging and other studies: I have personally reviewed pertinent reports     and I have personally reviewed pertinent films in PACS      Louis Hagan PA-C

## 2018-06-07 NOTE — PROGRESS NOTES
Antepartum Maternal-Fetal Medicine Physician Progress Note   Nita Prima 40 y o  female MRN: [id number]  Unit/Bed#: [facility] 815-01 Encounter: [id number]      ASSESSMENT:    39 yo [id number] at 35 1 p/w acute asthma exacerbation secondary to very poorly controlled, severely persistent asthma, now HD # 4 and stable        PLAN:     Acute asthma exacerbation:               Albuterol neb prn              Start pulmicort on 6/4/18              Cont solumedrol but taper from q8 to q12 starting today;               Eosinophilia: 0% on CBC w/diff on admit              Peak flow q6h; most recent 250 -> 300 @ 1930              Vitals: RR 18, HR 80 this am              Continuous pulseox              f/u sputum cx final results (initial showed gram+ cocci, gram+ rods)              d/w Pulmonary: consider ordering CXR if fevers, disparity in findings on auscultation, sputum cx results   h/o multiple hospitalizations for asthma: pulmonary c/s  Seasonal allergies: claritin  GI PPx: tums, pepcid  Anemia: Hb 9 8; start iron bid w/colace prn  IUP: prenatal vitamin, NST q shift  Hypokalemia: resolved  FEN: regular, LR  Pain mngmt: tylenol, aqua k  DVT px: OOB, SCDs        SUBJECTIVE:    Pain: 0/10  Tolerating Oral Intake: yes  Voiding: yes - spontaneously  Flatus: yes  Bowel Movement: yes  Ambulating: yes  Chest Pain: no  Shortness of Breath: no  Leg Pain/Discomfort: no    Vaginal Bleeding: no  Leaking of Fluid: no  Contractions: no  Fetal Movement: yes    (asthma)  coughing: yes - improved from yesterday  wheezing: yes - improved from yesterday  shortness of breath: yes - improved from yesterday    OBJECTIVE:     Vitals:   Patient Vitals for the past 24 hrs:   BP Temp Temp src Pulse Resp SpO2   06/07/18 0719 - - - - - 97 %   06/07/18 0049 - - - - - 97 %   06/06/18 2233 145/79 98 4 °F (36 9 °C) Oral 95 18 96 %   06/06/18 1926 - - - - - 96 %   06/06/18 1451 118/64 98 4 °F (36 9 °C) Oral (!) 106 18 97 %   06/06/18 1355 - - - - - 98 % I/O       06/05 0701 - 06/06 0700 06/06 0701 - 06/07 0700 06/07 0701 - 06/08 0700    P  O  1240 870     I V  (mL/kg)       Total Intake(mL/kg) 1240 (13 6) 870 (9 6)     Urine (mL/kg/hr) 1900 (0 9) 925 (0 4)     Stool 0 (0) 0 (0)     Total Output 1900 925      Net -660 -55             Unmeasured Stool Occurrence 1 x 1 x             Results from last 7 days  Lab Units 06/04/18  0538   WBC Thousand/uL 12 06*   NEUTROS ABS Thousands/µL 9 24*   HEMOGLOBIN g/dL 9 7*   MCV fL 84   PLATELETS Thousands/uL 222       Results from last 7 days  Lab Units 06/04/18  0538   NEUTROS PCT % 76*   MONOS PCT % 8   EOS PCT % 0       Results from last 7 days  Lab Units 06/06/18  0554 06/04/18  0538   SODIUM mmol/L 140 141   POTASSIUM mmol/L 4 0 3 0*   CHLORIDE mmol/L 109* 106   CO2 mmol/L 23 21   BUN mg/dL 5 4*   CREATININE mg/dL 0 69 0 81       Results from last 7 days  Lab Units 06/04/18  1906   RH FACTOR  Positive         Physical exam:  General:  No Acute Distress  Cardiovascular:  Regular Rate, Regular  rhythm  Lungs: inspiratory wheezing, expiratory rhonchi  Abdomen: Soft,  non-tender gravid uterus  Lower Extremities:  negative Lurdes's sign bilaterally    Non-stress Tests in [place] 24 hours:    Time of NST: 2120 on 6/6/18  NST: reactive    Variability: Moderate 6-25 bpm  Accelerations: 15 bpm x 15 secs or greater   Decelerations: none   Contraction Frequency (minutes): absent     MEDS:   Medication Administration - last 24 hours from 06/06/2018 0732 to 06/07/2018 0732       Date/Time Order Dose Route Action Action by     06/06/2018 0821 prenatal multivitamin tablet 1 tablet 1 tablet Oral Given Shahla Greenwood RN     06/06/2018 1657 acetaminophen (TYLENOL) tablet 650 mg 650 mg Oral Given Shahla Greenwood RN     06/06/2018 0944 acetaminophen (TYLENOL) tablet 650 mg 650 mg Oral Given Shahla Greenwood RN     06/07/2018 0612 ferrous sulfate tablet 325 mg 325 mg Oral Given Lisa Quintanilla RN     06/06/2018 2131 ferrous sulfate tablet 325 mg 325 mg Oral Given Stephanie Cummins, RN     06/06/2018 1800 docusate sodium (COLACE) capsule 100 mg   Oral Canceled Entry Stephanie Cummins RN     06/06/2018 1657 docusate sodium (COLACE) capsule 100 mg 100 mg Oral Given Stephanie Cummins, RN     06/06/2018 1759 docusate sodium (COLACE) capsule 100 mg 100 mg Oral Given Stephanie Cummins, RN     06/06/2018 6172 loratadine (CLARITIN) tablet 10 mg 10 mg Oral Given Stephanie Cummins, RN     06/06/2018 0223 famotidine (PEPCID) injection 20 mg 20 mg Intravenous Given Stephanie Cummins, RN     06/07/2018 0719 levalbuterol ([place]) inhalation solution 1 25 mg 1 25 mg Nebulization Given Vonne Bidding, RT     06/07/2018 0046 levalbuterol (XOPENEX) inhalation solution 1 25 mg 1 25 mg Nebulization Given Lexi Shows, RT     06/06/2018 1923 levalbuterol ([place]) inhalation solution 1 25 mg 1 25 mg Nebulization Given Lexi Shows, RT     06/06/2018 1351 levalbuterol (XOPENEX) inhalation solution 1 25 mg 1 25 mg Nebulization Given Sherrie Walters, RT     06/07/2018 0719 budesonide (PULMICORT) inhalation solution 0 5 mg 0 5 mg Nebulization Given Vonne Bidding, RT     06/06/2018 1923 budesonide (PULMICORT) inhalation solution 0 5 mg 0 5 mg Nebulization Given Lexi Shows, RT     06/07/2018 0612 methylPREDNISolone sodium succinate (Solu-MEDROL) injection 40 mg 40 mg Intravenous Given Thomas Olvera, RN     06/06/2018 2241 methylPREDNISolone sodium succinate (Solu-MEDROL) injection 40 mg 40 mg Intravenous Given Chuy Cole, RN     06/06/2018 1438 methylPREDNISolone sodium succinate (Solu-MEDROL) injection 40 mg 40 mg Intravenous Given Stephanie Cummins, RN     06/07/2018 0719 sodium chloride 0 9 % inhalation solution 3 mL 3 mL Nebulization Given Vonne Bidding, RT     06/07/2018 0046 sodium chloride 0 9 % inhalation solution 3 mL 3 mL Nebulization Given Lexi Shows, RT     06/06/2018 1924 sodium chloride 0 9 % inhalation solution 3 mL   Nebulization Canceled Entry Gary Marsh, RT     06/06/2018 1351 sodium chloride 0 9 % inhalation solution 3 mL 3 mL Nebulization Given Flavia Lockett, RT     06/06/2018 0821 potassium chloride (K-DUR,KLOR-CON) CR tablet 40 mEq 40 mEq Oral Given Jerry Orellana RN        Current Facility-Administered Medications   Medication Dose Route Frequency    acetaminophen (TYLENOL) tablet 650 mg  650 mg Oral Q6H PRN    albuterol inhalation solution 5 mg  5 mg Nebulization Q4H PRN    budesonide (PULMICORT) inhalation solution 0 5 mg  0 5 mg Nebulization Q12H    calcium carbonate (TUMS) chewable tablet 1,000 mg  1,000 mg Oral TID PRN    docusate sodium (COLACE) capsule 100 mg  100 mg Oral BID    famotidine (PEPCID) injection 20 mg  20 mg Intravenous Q24H FAVIOLA    ferrous sulfate tablet 325 mg  325 mg Oral BID AC    guaiFENesin (MUCINEX) 12 hr tablet 600 mg  600 mg Oral Q12H PRN    levalbuterol (XOPENEX) inhalation solution 1 25 mg  1 25 mg Nebulization Q6H    loratadine (CLARITIN) tablet 10 mg  10 mg Oral Daily    methylPREDNISolone sodium succinate (Solu-MEDROL) injection 40 mg  40 mg Intravenous Q8H [address]    ondansetron (ZOFRAN) injection 4 mg  4 mg Intravenous Q6H PRN    prenatal multivitamin tablet 1 tablet  1 tablet Oral Daily    sodium chloride 0 9 % inhalation solution 3 mL  3 mL Nebulization Q6H     Invasive Devices     Peripheral Intravenous Line            Peripheral IV 06/04/18 Right Antecubital 3 days                        Signature / Title: Severa Rom, MD, MD, Resident - Ob/Gyn    Date: 6/7/2018  Time: 7:32 AM

## 2018-06-07 NOTE — PROGRESS NOTES
Progress Note - MFM  Martha Olivera 40 y o  female MRN: [id number]  Unit/Bed#: [facility] 178-51 Encounter: [id number]      Martha Olivera has no current complaints  Denies contraction; has  no LOF, and no VB  Good FM  Breathing improved  /88 (BP Location: Right arm)   Pulse (!) 110   Temp 98 7 °F (37 1 °C) (Oral)   Resp 18   Ht 5' 2" (1 575 m)   Wt 90 9 kg (200 lb 4 8 oz)   SpO2 98%   BMI 36 64 kg/m²     Physical Exam:   General: AAOx3  No acute distress  Abdominal: Soft, non-tender gravid uterus  Extremities: No TTP  Lower limbs symmetric bilaterally  FHT:  Baseline Rate: 125 bpm  Variability: Moderate 6-25 bpm  Accelerations: 15 x 15 or greater  Decelerations: None  FHR Category: Category I  TOCO:   Contraction Frequency (minutes): none  Contraction Duration (seconds): n/a    Lab Results   Component Value Date    WBC 12 06 (H) 06/04/2018    HGB 9 7 (L) 06/04/2018    HCT 30 2 (L) 06/04/2018     06/04/2018     Lab Results   Component Value Date     06/06/2018    K 4 0 06/06/2018     (H) 06/06/2018    CO2 23 06/06/2018    BUN 5 06/06/2018    CREATININE 0 69 06/06/2018    GLUCOSE 100 06/06/2018       A/P: Martha Olivera is a 40 y o  [id number] at 35w1d admitted with exacerbation of asthma  Currently in stable condition  Per pt, pulm completing IV steroid wean and will transition to PO tomorrow  Had discussion w/ pt regarding plans for delivery and subsequent OB care  Her primary OB is in [address] [place] [address] [facility]  She has a home in [facility]  I have suggested that arrange f/u w/ her OB ASAP after discharge

## 2018-06-08 VITALS
DIASTOLIC BLOOD PRESSURE: 69 MMHG | HEIGHT: 62 IN | OXYGEN SATURATION: 98 % | BODY MASS INDEX: 36.86 KG/M2 | HEART RATE: 103 BPM | WEIGHT: 200.3 LBS | TEMPERATURE: 98.1 F | RESPIRATION RATE: 18 BRPM | SYSTOLIC BLOOD PRESSURE: 120 MMHG

## 2018-06-08 LAB
BACTERIA SPT RESP CULT: NORMAL
GRAM STN SPEC: NORMAL

## 2018-06-08 PROCEDURE — 99232 SBSQ HOSP IP/OBS MODERATE 35: CPT | Performed by: OBSTETRICS & GYNECOLOGY

## 2018-06-08 PROCEDURE — 59025 FETAL NON-STRESS TEST: CPT | Performed by: OBSTETRICS & GYNECOLOGY

## 2018-06-08 PROCEDURE — 99232 SBSQ HOSP IP/OBS MODERATE 35: CPT | Performed by: INTERNAL MEDICINE

## 2018-06-08 RX ORDER — ALBUTEROL SULFATE 90 UG/1
2 AEROSOL, METERED RESPIRATORY (INHALATION) EVERY 4 HOURS PRN
Qty: 1 INHALER | Refills: 11 | Status: SHIPPED | OUTPATIENT
Start: 2018-06-08

## 2018-06-08 RX ORDER — LORATADINE 10 MG/1
10 TABLET ORAL DAILY
Qty: 30 TABLET | Refills: 11 | Status: SHIPPED | OUTPATIENT
Start: 2018-06-09 | End: 2019-01-12

## 2018-06-08 RX ORDER — PREDNISONE 10 MG/1
TABLET ORAL
Qty: 30 TABLET | Refills: 0 | Status: SHIPPED | OUTPATIENT
Start: 2018-06-08 | End: 2020-12-01

## 2018-06-08 RX ORDER — BUDESONIDE AND FORMOTEROL FUMARATE DIHYDRATE 160; 4.5 UG/1; UG/1
2 AEROSOL RESPIRATORY (INHALATION) 2 TIMES DAILY
Qty: 1 INHALER | Refills: 11 | Status: SHIPPED | OUTPATIENT
Start: 2018-06-08 | End: 2019-01-12

## 2018-06-08 RX ORDER — FAMOTIDINE 40 MG/1
40 TABLET, FILM COATED ORAL DAILY
Qty: 30 TABLET | Refills: 11 | Status: SHIPPED | OUTPATIENT
Start: 2018-06-08 | End: 2019-01-12

## 2018-06-08 RX ORDER — PREDNISONE 20 MG/1
40 TABLET ORAL DAILY
Status: DISCONTINUED | OUTPATIENT
Start: 2018-06-09 | End: 2018-06-08 | Stop reason: HOSPADM

## 2018-06-08 RX ORDER — FERROUS SULFATE 325(65) MG
325 TABLET ORAL
Qty: 60 TABLET | Refills: 5 | Status: SHIPPED | OUTPATIENT
Start: 2018-06-08 | End: 2019-01-12

## 2018-06-08 RX ORDER — CALCIUM CARBONATE 200(500)MG
1000 TABLET,CHEWABLE ORAL 3 TIMES DAILY
Qty: 90 TABLET | Refills: 11 | Status: SHIPPED | OUTPATIENT
Start: 2018-06-08 | End: 2019-01-12

## 2018-06-08 RX ORDER — PREDNISONE 20 MG/1
40 TABLET ORAL DAILY
Status: DISCONTINUED | OUTPATIENT
Start: 2018-06-08 | End: 2018-06-08

## 2018-06-08 RX ADMIN — FERROUS SULFATE TAB 325 MG (65 MG ELEMENTAL FE) 325 MG: 325 (65 FE) TAB at 08:19

## 2018-06-08 RX ADMIN — METHYLPREDNISOLONE SODIUM SUCCINATE 40 MG: 40 INJECTION, POWDER, FOR SOLUTION INTRAMUSCULAR; INTRAVENOUS at 08:19

## 2018-06-08 RX ADMIN — FAMOTIDINE 20 MG: 10 INJECTION, SOLUTION INTRAVENOUS at 08:19

## 2018-06-08 RX ADMIN — LORATADINE 10 MG: 10 TABLET ORAL at 08:19

## 2018-06-08 RX ADMIN — BUDESONIDE AND FORMOTEROL FUMARATE DIHYDRATE 2 PUFF: 160; 4.5 AEROSOL RESPIRATORY (INHALATION) at 08:19

## 2018-06-08 RX ADMIN — DOCUSATE SODIUM 100 MG: 100 CAPSULE, LIQUID FILLED ORAL at 08:19

## 2018-06-08 RX ADMIN — ACETAMINOPHEN 650 MG: 325 TABLET ORAL at 07:18

## 2018-06-08 RX ADMIN — Medication 1 TABLET: at 08:19

## 2018-06-08 NOTE — PROGRESS NOTES
Antepartum Maternal-Fetal Medicine Physician Progress Note   Miguel Fontana 40 y o  female MRN: [id number]  Unit/Bed#: [facility] 815-01 Encounter: [id number]      ASSESSMENT:  41 yo [id number] at 35 2 p/w acute asthma exacerbation secondary to very poorly controlled, severely persistent asthma, now HD # 5 and stable        PLAN:  Acute asthma exacerbation: per pulmonary  GI PPx: tums, pepcid  Anemia: Hb 9 8; start iron bid w/colace prn  IUP: prenatal vitamin, NST q shift  Hypokalemia: resolved  FEN: regular, LR  Pain mngmt: tylenol  DVT px: OOB, SCDs  Dispo: plan for d/c home today with prednisone taper, symbicort, albuterol    SUBJECTIVE:    Pain: 0/10  Tolerating Oral Intake: yes  Voiding: yes - spontaneously  Flatus: yes  Bowel Movement: yes  Ambulating: yes  Chest Pain: no  Shortness of Breath: no  Leg Pain/Discomfort: no    Vaginal Bleeding: no  Leaking of Fluid: no  Contractions: no  Fetal Movement: yes      (asthma)  coughing: improved; still present  wheezing: improved; still present  shortness of breath: improved; still present    OBJECTIVE:     Vitals:   Patient Vitals for the past 24 hrs:   BP Temp Temp src Pulse Resp SpO2   06/08/18 0300 129/70 - - - - -   06/07/18 2300 154/72 99 °F (37 2 °C) Oral 74 18 95 %   06/07/18 1500 130/80 99 1 °F (37 3 °C) Oral 105 18 97 %   06/07/18 1324 - - - - - 98 %       I/O       06/06 0701 - 06/07 0700 06/07 0701 - 06/08 0700 06/08 0701 - 06/09 0700    P  O  870 810     Total Intake(mL/kg) 870 (9 6) 810 (8 9)     Urine (mL/kg/hr) 925 (0 4) 800 (0 4)     Stool 0 (0) 0 (0)     Total Output 925 800      Net -55 +10             Unmeasured Stool Occurrence 1 x              Results from last 7 days  Lab Units 06/04/18  0538   WBC Thousand/uL 12 06*   NEUTROS ABS Thousands/µL 9 24*   HEMOGLOBIN g/dL 9 7*   MCV fL 84   PLATELETS Thousands/uL 222       Results from last 7 days  Lab Units 06/04/18  0538   NEUTROS PCT % 76*   MONOS PCT % 8   EOS PCT % 0       Results from last 7 days  Lab Units 06/06/18  0554 06/04/18  0538   SODIUM mmol/L 140 141   POTASSIUM mmol/L 4 0 3 0*   CHLORIDE mmol/L 109* 106   CO2 mmol/L 23 21   BUN mg/dL 5 4*   CREATININE mg/dL 0 69 0 81               Results from last 7 days  Lab Units 06/04/18  1906   RH FACTOR  Positive       There is no immunization history on file for this patient  Physical exam:  General:  No Acute Distress  Cardiovascular: Regular Rate , Regular  rhythm  Lungs:   Inspiratory wheezing,  Inspiratory rhonchi  bilaterally  Abdomen: Soft,  non-tender gravid uterus  Lower Extremities:  negative Lurdes's sign bilaterally    Non-stress Tests in Trailing 24 hours:    Time of NST: 0530 on 6/7/18  NST: reactive   FHR baseline: 130  Variability: Moderate 6-25 bpm  Accelerations: 15 bpm x 15 secs or greater   Decelerations: none   Contraction Frequency (minutes): absent     Time of NST: 1110 on 6/7/18  NST: reactive   FHR baseline: 135  Variability: Moderate 6-25 bpm  Accelerations: 15 bpm x 15 secs or greater   Decelerations: none   Contraction Frequency (minutes): absent       MEDS:   Medication Administration - last 24 hours from 06/07/2018 0844 to 06/08/2018 0844       Date/Time Order Dose Route Action Action by     06/08/2018 0819 prenatal multivitamin tablet 1 tablet 1 tablet Oral Given Doyle Galeano RN     06/08/2018 1741 acetaminophen (TYLENOL) tablet 650 mg 650 mg Oral Given Doyle Galeano RN     06/07/2018 1654 acetaminophen (TYLENOL) tablet 650 mg 650 mg Oral Given Doyle Galeano RN     06/07/2018 1011 acetaminophen (TYLENOL) tablet 650 mg 650 mg Oral Given Doyle Galeano RN     06/08/2018 4278 ferrous sulfate tablet 325 mg 325 mg Oral Given Doyle Galeano RN     06/07/2018 1655 ferrous sulfate tablet 325 mg 325 mg Oral Given Doyle Galeano RN     06/08/2018 5234 docusate sodium (COLACE) capsule 100 mg 100 mg Oral Given Doyle Galeano RN     06/07/2018 1800 docusate sodium (COLACE) capsule 100 mg   Oral Canceled Entry Wilner Charu, RN     06/07/2018 1655 docusate sodium (COLACE) capsule 100 mg 100 mg Oral Given Wilner Hyatts, RN     06/08/2018 3224 loratadine (CLARITIN) tablet 10 mg 10 mg Oral Given Wilner Hyatts, RN     06/08/2018 3510 famotidine (PEPCID) injection 20 mg 20 mg Intravenous Given Wilner Hyatts, RN     06/07/2018 1322 levalbuterol (Curlene Deer) inhalation solution 1 25 mg 1 25 mg Nebulization Given Sharyle Renay, RT     06/07/2018 1322 sodium chloride 0 9 % inhalation solution 3 mL 3 mL Nebulization Given Benedictyle Renay, RT     06/08/2018 0819 methylPREDNISolone sodium succinate (Solu-MEDROL) injection 40 mg 40 mg Intravenous Given Wilner Hyatts, RN     06/07/2018 2100 methylPREDNISolone sodium succinate (Solu-MEDROL) injection 40 mg 40 mg Intravenous Given Socorro Shashi, RN     06/08/2018 5054 budesonide-formoterol (SYMBICORT) 160-4 5 mcg/act inhaler 2 puff 2 puff Inhalation Given Wilner Hyatts, RN     06/07/2018 2101 budesonide-formoterol (SYMBICORT) 160-4 5 mcg/act inhaler 2 puff 2 puff Inhalation Given Socorro Danielle, RN     06/07/2018 1421 budesonide-formoterol (SYMBICORT) 160-4 5 mcg/act inhaler 2 puff 2 puff Inhalation Given Wilner RADHA Box        Current Facility-Administered Medications   Medication Dose Route Frequency    acetaminophen (TYLENOL) tablet 650 mg  650 mg Oral Q6H PRN    albuterol (PROVENTIL HFA,VENTOLIN HFA) inhaler 2 puff  2 puff Inhalation Q4H PRN    budesonide-formoterol (SYMBICORT) 160-4 5 mcg/act inhaler 2 puff  2 puff Inhalation BID    calcium carbonate (TUMS) chewable tablet 1,000 mg  1,000 mg Oral TID PRN    docusate sodium (COLACE) capsule 100 mg  100 mg Oral BID    famotidine (PEPCID) injection 20 mg  20 mg Intravenous Q24H FAVIOLA    ferrous sulfate tablet 325 mg  325 mg Oral BID AC    guaiFENesin (MUCINEX) 12 hr tablet 600 mg  600 mg Oral Q12H PRN    loratadine (CLARITIN) tablet 10 mg  10 mg Oral Daily    ondansetron (ZOFRAN) injection 4 mg  4 mg Intravenous Q6H PRN    [START ON 6/9/2018] predniSONE tablet 40 mg  40 mg Oral Daily    prenatal multivitamin tablet 1 tablet  1 tablet Oral Daily     Invasive Devices     Peripheral Intravenous Line            Peripheral IV 06/07/18 Right Antecubital less than 1 day                        Signature / Title: Krysten Rawls MD, MD, Resident - Ob/Gyn    Date: 6/8/2018  Time: 8:44 AM

## 2018-06-08 NOTE — DISCHARGE INSTRUCTIONS
Pt is a 40 y o  [id number] with an BARBARA of 7/11/2018  She presented to the ED triage on 6/4/18 complaining of worsening cough, wheezing and shortness of breath secondary to acute asthma exacerbation in the setting of very poorly controlled severe persistent asthma  She had completed a 30 day course of 40 mg prednisone daily on 5/30/2018, without a steroid taper  She was using only albuterol via MDI to manage her exacerbation symptoms  She reported "30" previous hospitalizations for acute asthma exacerbation [place] [address] [place] [address] in 2008  Auscultation findings initially showed inspiratory and expiratory wheezes and expiratory rhonchi  A sputum culture grew only mixed oral damon contaminants  She was always afebrile  During her hospital course, she was started on solumedrol 40 mg q8h and pulmicort 0 5 mg nebs q12h  Her steroids were then tapered to solumedrol 40 mg q12  She was transitioned from pulmicort nebs to symbicort 160/4 5 MDI  To control her seasonal allergic rhinitis, she was started on Claritin 10 mg q daily  To control her acid reflux symptoms, she was started on Pepcid 20 bid and Tums 1000 tid  She will follow up with Dr Luis Gomez office in 4 weeks time, or sooner if indicated  For her anemia, she was started on ferrous sulfate 325 mg bid  Asthma   WHAT YOU NEED TO KNOW:   Asthma is a lung disease that makes breathing difficult  Chronic inflammation and reactions to triggers narrow the airways in the lungs  Asthma can become life-threatening if it is not managed  DISCHARGE INSTRUCTIONS:   Seek care immediately if:   · You have severe shortness of breath  · Your lips or nails turn blue or gray  · The skin around your neck and ribs pulls in with each breath  · You have shortness of breath, even after you take your short-term medicine as directed  · Your peak flow numbers are in the red zone of your AAP    Contact your healthcare provider if:   · You run out of medicine before your next refill is due  · Your symptoms get worse  · You need to take more medicine than usual to control your symptoms  · You have questions or concerns about your condition or care  Medicines:   · Medicines  decrease inflammation, open airways, and make it easier to breathe  Medicines may be inhaled, taken as a pill, or injected  Short-term medicines relieve your symptoms quickly  Long-term medicines are used to prevent future attacks  You may also need medicine to help control your allergies  Ask your healthcare provider for more information about the medicine you are given and how to take it safely  · Take your medicine as directed  Contact your healthcare provider if you think your medicine is not helping or if you have side effects  Tell him or her if you are allergic to any medicine  Keep a list of the medicines, vitamins, and herbs you take  Include the amounts, and when and why you take them  Bring the list or the pill bottles to follow-up visits  Carry your medicine list with you in case of an emergency  Follow up with your healthcare provider as directed: You will need to return to make sure your medicine is working and your symptoms are controlled  You may be referred to an asthma specialist  Taylor Rick may be asked to keep a record of your peak flow values and bring it with you to your appointments  Write down your questions so you remember to ask them  Manage your symptoms and prevent future attacks:   · Follow your Asthma Action Plan (AAP)  This is a written plan that you and your healthcare provider create  It explains which medicine you need and when to change doses if necessary  It also explains how you can monitor symptoms and use a peak flow meter  The meter measures how well your lungs are working  · Manage other health conditions , such as allergies, acid reflux, and sleep apnea  · Identify and avoid triggers    These may include pets, dust mites, mold, and cockroaches  · Do not smoke or be around others who smoke  Nicotine and other chemicals in cigarettes and cigars can cause lung damage  Ask your healthcare provider for information if you currently smoke and need help to quit  E-cigarettes or smokeless tobacco still contain nicotine  Talk to your healthcare provider before you use these products  · Ask about the flu vaccine  The flu can make your asthma worse  You may need a yearly flu shot  © 2017 [employer] Information is for End User's use only and may not be sold, redistributed or otherwise used for commercial purposes  All illustrations and images included in CareNotes® are the copyrighted property of A D A M [employer]  or Anatoly Varma  The above information is an  only  It is not intended as medical advice for individual conditions or treatments  Talk to your doctor, nurse or pharmacist before following any medical regimen to see if it is safe and effective for you

## 2018-06-08 NOTE — CASE MANAGEMENT
Initial Clinical Review    Admission: Date/Time/Statement: 6/4/18 @ 1025  TRANSFER FROM [facility] TO [facility]     Orders Placed This Encounter   Procedures    Inpatient Admission     Standing Status:   Standing     Number of Occurrences:   1     Order Specific Question:   Admitting Physician     Answer:   Agustina Masters     Order Specific Question:   Level of Care     Answer:   Med Surg [16]     Order Specific Question:   Estimated length of stay     Answer:   More than 2 Midnights     Order Specific Question:   Certification     Answer:   I certify that inpatient services are medically necessary for this patient for a duration of greater than two midnights  See H&P and MD Progress Notes for additional information about the patient's course of treatment  History of Illness: 39 yo [id number] at 34 5 p/w acute asthma exacerbation secondary to very poorly controlled, severely persistent asthma, now HD # 1 and stable      ED Vital Signs:   ED Triage Vitals   Temperature Pulse Respirations Blood Pressure SpO2   06/04/18 1047 06/04/18 1047 06/04/18 1047 06/04/18 1047 06/04/18 1047   99 2 °F (37 3 °C) (!) 131 20 119/74 95 %      Temp Source Heart Rate Source Patient Position - Orthostatic VS BP Location FiO2 (%)   06/04/18 1047 06/04/18 1630 06/04/18 1047 06/04/18 1047 --   Oral Monitor Sitting Left arm       Pain Score       06/04/18 1030       No Pain          Wt Readings from Last 1 Encounters:   06/04/18 90 9 kg (200 lb 4 8 oz)     Vital Signs (abnormal):   06/04/18 2332  98 6 °F (37 °C)   114  18  125/76  96 %  None (Room air)  Lying   06/04/18 1630  98 5 °F (36 9 °C)   120   24  139/85  96 %  None (Room air)  Lying   06/04/18 1200  --   120  --  --  97 %  --  --   Comment rows:   OBSERV: Pt sitting upright, leaning forward at 06/04/18 1105   06/04/18 1047  99 2 °F (37 3 °C)   131  20  119/74  95 %  None (Room air)  Sitting     PEAK FLOW  6/4 Pre - 250   Post - 300    6/5 Pre - 210   Post - 250  Inspiraotory wheezes bilat     Abnormal Labs:    K 3 0  Anion gap 14  BUN 4   WBC 12 06  RBC 3 59  H/H 9 7/30 2    Past Medical/Surgical History:     Diagnosis    Asthma    Migraine    PCOS (polycystic ovarian syndrome)    Encounter for supervision of normal pregnancy in multigravida in second trimester    History of pre-eclampsia in prior pregnancy, currently pregnant in second trimester    History of  delivery, currently pregnant in second trimester    Advanced maternal age in multigravida, second trimester    History of ectopic pregnancy     Resolved Ambulatory Problems     Diagnosis Date Noted    No Resolved Ambulatory Problems     Diagnosis    Anemia    Asthma    Meningitis    Migraines     Admitting Diagnosis: Asthma in adult, unspecified asthma severity, with status asthmaticus [J45 902]    Age/Sex: 40 y o  female    Assessment/Plan:   39 yo [id number] at 27 8 IUP with acute asthma exacerbation secondary to very poorly controlled, severely persistent asthma, now HD # 1 and stable      PLAN:  Acute asthma exacerbation:               Albuterol neb prn              Start symbicort              Resume prednisone 20 mg q daily              pulseox 97 @ 11:45 am, maternal  bpm (just had neb tx), RR 28              Eosinophilia: 0% on CBC w/diff              Peak flow q6h              Continuous pulseox  Seasonal allergies: zyrtec  GI PPx: tums, pepcid  h/o multiple hospitalizations for asthma: pulmonary c/s  Anemia: Hb 9 8; start iron bid w/colace prn  IUP: prenatal vitamin, cEFM, external toco   Hypokalemia: K+ 3 0: s/p K-Dur 40; repeat again in am  Diet: regular  Pain mngmt: tylenol, aqua k  IV fluids: LR   DVT px: OOB, SCDs  Dispo: inpt until stabilized    Admission Orders:  Scheduled Meds:   Current Facility-Administered Medications:  acetaminophen 650 mg Oral Q6H PRN   albuterol 5 mg Nebulization Q4H PRN   budesonide 0 5 mg Nebulization Q12H   calcium carbonate 1,000 mg Oral TID PRN   docusate sodium 100 mg Oral BID   famotidine 20 mg Intravenous Q24H FAVIOLA   ferrous sulfate 325 mg Oral BID AC   guaiFENesin 600 mg Oral Q12H PRN   levalbuterol 1 25 mg Nebulization Q6H   loratadine 10 mg Oral Daily   methylPREDNISolone sodium succinate 40 mg Intravenous Q8H [facility]   ondansetron 4 mg Intravenous Q6H PRN   [START ON 6/6/2018] potassium chloride 40 mEq Oral Once   prenatal multivitamin 1 tablet Oral Daily   sodium chloride 3 mL Nebulization Q6H     Continuous Infusions:  IV LR @ 125 ml/hr from admission to 6/5 @ 0845  PRN Meds:   Acetaminophen x2    Albuterol x1    calcium carbonate    guaiFENesin x1    ondansetron    SCDs  Sputum C&S   Diet regular  Cons Pulmonology   Cons Perinatology   Peak flow q 6 hr   Fetal nonstress test q shift  _______________________  6/4 Maternal Fetal Medicine Consult   39 yo [id number] at 27 8 p/w acute asthma exacerbation secondary to very poorly controlled, severely persistent asthma, now HD # 1 and stable  PLAN:   Acute asthma exacerbation:               Albuterol neb prn              Start symbicort              Resume prednisone 20 mg q daily              pulseox 97 @ 11:45 am, maternal  bpm (just had neb tx), RR 28              Eosinophilia: 0% on CBC w/diff              Peak flow q6h              Continuous pulseox  Seasonal allergies: zyrtec  GI PPx: tums, pepcid  h/o 30 hospitalizations for asthma: pulmonary c/s  Anemia: Hb 9 8; start iron bid w/colace prn  IUP: prenatal vitamin, cEFM, external toco   Hypokalemia: K+ 3 0: s/p K-Dur 40; repeat again in am  Diet: regular  Pain mngmt: tylenol, aqua k  IV fluids: LR   DVT px: OOB, SCDs   Dispo: inpt until stabilized  ____________________  6/4 Pulmonary Consult   1  Severe persistent asthma with acute exacerbation        *  Increase steroids to solumedrol 40mg Q8hrs        *  D/C symbicort and start pulmicort Q12hrs        *  Continue Xopenex Q6hrs    Stop Atrovent        *  Will need outpatient PFTs as well as RAST/IgE level once post partum  May be candidate for injectable therapy  2  Allergic rhinitis        *  Continue Claritin        *  May need outpatient referral to allergist pending results of above testing  3  GERD        *  Continue IV pepcid per primary team        *  Likely major contributing factor to worsening asthma     ~Will need outpatient pulmonary follow up in our [facility] once baby is delivered and PFTs/blood work can be done  __________________  6/5 Maternal Fetal Medicine Progress Note  Good fetal movement   Time of NST #1: 0600  NST: reactive   Variability: Moderate 6-25 bpm  Accelerations: 15 bpm x 15 secs or greater   Decelerations: none   Contraction Frequency (minutes): 3 over 60 mins  ________________  6/5 Pulmonary Progress Note  1  Severe persistent asthma with acute exacerbation        *  Continue solumedrol 40mg Q8hrs & hope to decrease in AM        *  Continue pulmicort Q12hrs & Xopenex Q6hrs        *  Will need outpatient PFTs as well as RAST/IgE level once post partum   May be candidate for injectable therapy  2  Allergic rhinitis        *  Continue Claritin        *  May need outpatient referral to allergist pending results of above testing  3    GERD        *  Continue IV pepcid per primary team        *  Likely major contributing factor to worsening asthma     ~Check sputum culture      Thank you,  [facility] in the [facility] by Anatoly Varma for 2017  Network Utilization Review Department  Phone: [phone]; Fax [phone]  ATTENTION: The Network Utilization Review Department is now centralized for our 7 Facilities  Make a note that we have a new phone and fax numbers for our Department  Please call with any questions or concerns to [phone] and carefully follow the prompts so that you are directed to the right person   All voicemails are confidential  Fax any determinations, approvals, denials, and requests for initial or continue stay review clinical to [phone]  Due to HIGH CALL volume, it would be easier if you could please send faxed requests to expedite your requests and in part, help us provide discharge notifications faster    Continued Stay Review    Date: 6/8/18    Vital Signs: /69 (BP Location: Left arm)   Pulse 103   Temp 98 1 °F (36 7 °C) (Oral)   Resp 18   Ht 5' 2" (1 575 m)   Wt 90 9 kg (200 lb 4 8 oz)   SpO2 98%   BMI 36 64 kg/m²     Medications:   Scheduled Meds:     Current Facility-Administered Medications:  acetaminophen 650 mg Oral Q6H PRN Zerita Sever, MD   albuterol 2 puff Inhalation Q4H PRN Phillip Hubbard MD   budesonide-formoterol 2 puff Inhalation BID Phillip Hubbard MD   calcium carbonate 1,000 mg Oral TID PRN Zerita Sever, MD   docusate sodium 100 mg Oral BID Zerita Sever, MD   famotidine 20 mg Intravenous Q24H [address] Zerita Sever, MD   ferrous sulfate 325 mg Oral BID AC Zerita Sever, MD   guaiFENesin 600 mg Oral Q12H PRN Zerita Sever, MD   loratadine 10 mg Oral Daily Zerita Sever, MD   ondansetron 4 mg Intravenous Q6H PRN Zerita Sever, MD Erwin Bernhardt ON 6/9/2018] predniSONE 40 mg Oral Daily Cornelius Alexander PA-C   prenatal multivitamin 1 tablet Oral Daily Zerita Sever, MD     PRN Meds:   Acetaminophen x2 in last 24 hrs    albuterol    calcium carbonate    guaiFENesin    ondansetron    Abnormal Labs/Diagnostic Results:   Cl 109    Age/Sex: 40 y o  female     Assessment/Plan:   6/8 OB GYN Progress Note  39 yo [id number] at 35 2 p/w acute asthma exacerbation secondary to very poorly controlled, severely persistent asthma, now HD # 5 and stable       PLAN:  Acute asthma exacerbation: per pulmonary  GI PPx: tums, pepcid  Anemia: Hb 9 8; start iron bid w/colace prn  IUP: prenatal vitamin, NST q shift  Hypokalemia: resolved  FEN: regular, LR  Pain mngmt: tylenol  DVT px: OOB, SCDs  Dispo: plan for d/c home today with prednisone taper, symbicort, albuterol  ________________________  6/8 Pulmonary Progress Note  2  Severe persistent asthma with acute exacerbation        *  Start prednisone now  Taper by 10mg every 4 days down to baseline 10-20mg daily as she was taking prior to admission        *  Continue symbicort and would have her remain on this at D/C along with prn albuterol        *  Will need outpatient PFTs as well as RAST/IgE level once post partum   May be candidate for injectable therapy        *  Needs OOB, increase activity   Incentive spirometry Q1hr  3   Allergic rhinitis        *  Continue Claritin        *  May need outpatient referral to allergist pending results of above testing  3    GERD        *  Continue IV pepcid per primary team        *  Likely major contributing factor to worsening asthma     ~Outpatient pulmonary follow up in our [place] office as per D/C instructions    Discharge Plan: dc today

## 2018-06-08 NOTE — PROGRESS NOTES
Progress Note - Pulmonary   Dyan Spence 40 y o  female MRN: [id number]  Unit/Bed#: [facility] 815-01 Encounter: [id number]      Assessment/Plan:  1  Severe persistent asthma with acute exacerbation        *  Start prednisone now  Taper by 10mg every 4 days down to baseline 10-20mg daily as she was taking prior to admission        *  Continue symbicort and would have her remain on this at D/C along with prn albuterol        *  Will need outpatient PFTs as well as RAST/IgE level once post partum   May be candidate for injectable therapy        *  Needs OOB, increase activity  Incentive spirometry Q1hr  2  Allergic rhinitis        *  Continue Claritin        *  May need outpatient referral to allergist pending results of above testing  3    GERD        *  Continue IV pepcid per primary team        *  Likely major contributing factor to worsening asthma     ~Outpatient pulmonary follow up in our [facility] office as per D/C instructions  ~Stable for D/C from a pulmonary standpoint  Subjective:   Ms Arsenio Gomez is seen sitting in bed this morning eating her breakfast   She is feeling better and his hope full to go home later today  Cough and shortness of Breath are better  She continues to have occasional clear sputum production  She denies hemoptysis  Bronchospasm has improved  She has remained afebrile  She denies resting shortness of breath or chest pain  She is aware of the importance of timely pulmonary follow-up  Objective:     Vitals: Blood pressure 129/70, pulse 74, temperature 99 °F (37 2 °C), temperature source Oral, resp  rate 18, height 5' 2" (1 575 m), weight 90 9 kg (200 lb 4 8 oz), SpO2 95 %, unknown if currently breastfeeding , RA, Body mass index is 36 64 kg/m²        Intake/Output Summary (Last 24 hours) at 06/08/18 0817  Last data filed at 06/08/18 0341   Gross per 24 hour   Intake              810 ml   Output              800 ml   Net               10 ml         Physical Exam  Gen: Awake, alert, oriented x 3, no acute distress  HEENT: Mucous membranes moist, no oral lesions, no thrush  NECK: No accessory muscle use, JVP not elevated  Cardiac: Regular, single S1, single S2, no murmurs, no rubs, no gallops  Lungs:  Decreased breath sounds throughout bilaterally  There are no wheezes, rhonchi or rales noted  Abdomen:  + 35wk IUP, normoactive bowel sounds, soft nontender, nondistended, no rebound or rigidity, no guarding  Extremities: no cyanosis, no clubbing, no edema    Labs: I have personally reviewed pertinent lab results  , ABG: No results found for: PHART, JYR4COL, PO2ART, MEN9VXD, T3FMBQRL, BEART, SOURCE, BNP: No results found for: BNP, CBC: No results found for: WBC, HGB, HCT, MCV, PLT, ADJUSTEDWBC, MCH, MCHC, RDW, MPV, NRBC, CMP: No results found for: NA, K, CL, CO2, ANIONGAP, BUN, CREATININE, GLUCOSE, CALCIUM, AST, ALT, ALKPHOS, PROT, ALBUMIN, BILITOT, EGFR, PT/INR: No results found for: PT, INR, Troponin: No results found for: TROPONINI     Prelim Sputum Culture  GRAM STAIN RESULT  1+ Epithelial Cells      2+ Polys      3+ Gram positive cocci in pairs      1+ Gram positive rods            Imaging and other studies: I have personally reviewed pertinent films in PACS    No new pulmonary imaging in the system to be reviewed    Jenifer Peraza PA-C

## 2018-06-08 NOTE — DISCHARGE SUMMARY
Discharge Summary -   Daija Mcdermott 40 y o  female MRN: [id number]  Unit/Bed#: [facility] 882-38 Encounter: [id number]    Admission Date: 6/4/2018   Discharge Date: 6/8/2018    Attending on Day of Discharge: Dr Alba Collazo    Attending on Day of Discharge: Dr Rosa Husain    Primary Diagnosis:   Acute asthma exacerbation in the setting of very poorly controlled severe persistent asthma    Secondary Diagnosis:   Intrauterine pregnancy w/Single Fetus at 35 wks and 2 days on day of discharge    Procedures: None      Hospital Course:       Pt is a 40 y o  [id number] with an BARBARA of 7/11/2018  She presented to the ED triage on 6/4/18 complaining of worsening cough, wheezing and shortness of breath secondary to acute asthma exacerbation in the setting of very poorly controlled severe persistent asthma  She had completed a 30 day course of 40 mg prednisone daily on 5/30/2018, without a steroid taper  She was using only albuterol via MDI to manage her exacerbation symptoms  She reported "30" previous hospitalizations for acute asthma exacerbation since moving from [place] to [address] [place] [address] in 2008  Auscultation findings initially showed inspiratory and expiratory wheezes and expiratory rhonchi  A sputum culture grew only mixed oral damon contaminants  She was always afebrile  During her hospital course, she was started on solumedrol 40 mg q8h and pulmicort 0 5 mg nebs q12h  Her steroids were then tapered to solumedrol 40 mg q12  She was transitioned from pulmicort nebs to symbicort 160/4 5 MDI  To control her seasonal allergic rhinitis, she was started on Claritin 10 mg q daily  To control her acid reflux symptoms, she was started on Pepcid 20 bid and Tums 1000 tid  She will follow up with Dr Payne Ginette office in 4 weeks time, or sooner if indicated  For her anemia, she was started on ferrous sulfate 325 mg bid  DVT prophylaxis was accomplished via sequential compression devices           Results from last 7 days  Lab Units 06/04/18  0538   WBC Thousand/uL 12 06*   HEMOGLOBIN g/dL 9 7*   MCV fL 84   PLATELETS Thousands/uL 222       Results from last 7 days  Lab Units 06/04/18  0538   NEUTROS PCT % 76*   MONOS PCT % 8   EOS PCT % 0       Results from last 7 days  Lab Units 06/06/18  0554 06/04/18  0538   SODIUM mmol/L 140 141   POTASSIUM mmol/L 4 0 3 0*   CHLORIDE mmol/L 109* 106   CO2 mmol/L 23 21   ANION GAP mmol/L 8 14*   GLUCOSE RANDOM mg/dL 100 89   BUN mg/dL 5 4*   CREATININE mg/dL 0 69 0 81   EGFR ml/min/1 73sq m 130 108       On day of discharge, she was ambulating and able to reasonably perform all Activities of Daily Living  She was voiding and had appropriate bowel function  She denied pain  Condition at discharge: stable    Discharge Medications:  Prednisone 40 mg, to be tapered by 10 mg every 3 days;    Symbicort 160/4 5 2 puffs bid  Albuterol 90 2 puffs q4h prn  Pepcid 40 q daily  Claritin 10 q daily  Tums 1000 tid       Discharge instructions/Information:  Please see After Visit Summary (AVS)    Disposition: Stable    Planned Readmission: No     Temitope Bojorquez MD  6/8/2018  3:01 PM

## 2018-06-11 NOTE — CASE MANAGEMENT
Stefani Adjutant  Signed   Case Management Date of Service: 6/8/2018  4:07 PM         []Hide copied text  Initial Clinical Review     Admission: Date/Time/Statement: 6/4/18 @ 1025  TRANSFER FROM [facility] TO [facility]   PENDING REFERENCE #[id number]           Orders Placed This Encounter   Procedures    Inpatient Admission       Standing Status:   Standing       Number of Occurrences:   1       Order Specific Question:   Admitting Physician       Answer:   Meg Ortega       Order Specific Question:   Level of Care       Answer:   Med Surg [16]       Order Specific Question:   Estimated length of stay       Answer:   More than 2 Midnights       Order Specific Question:   Certification       Answer:   I certify that inpatient services are medically necessary for this patient for a duration of greater than two midnights   See H&P and MD Progress Notes for additional information about the patient's course of treatment       History of Illness: 39 yo [id number] at 34 5 p/w acute asthma exacerbation secondary to very poorly controlled, severely persistent asthma, now HD # 1 and stable      ED Vital Signs:          ED Triage Vitals   Temperature Pulse Respirations Blood Pressure SpO2   06/04/18 1047 06/04/18 1047 06/04/18 1047 06/04/18 1047 06/04/18 1047   99 2 °F (37 3 °C) (!) 131 20 119/74 95 %       Temp Source Heart Rate Source Patient Position - Orthostatic VS BP Location FiO2 (%)   06/04/18 1047 06/04/18 1630 06/04/18 1047 06/04/18 1047 --   Oral Monitor Sitting Left arm         Pain Score           06/04/18 1030           No Pain                  Wt Readings from Last 1 Encounters:   06/04/18 90 9 kg (200 lb 4 8 oz)      Vital Signs (abnormal):   06/04/18 2332   98 6 °F (37 °C)    114   18   125/76   96 %   None (Room air)   Lying   06/04/18 1630   98 5 °F (36 9 °C)    120    24   139/85   96 %   None (Room air)   Lying   06/04/18 1200   --    120   --   --   97 %   --   --   Comment rows:   OBSERV: Pt sitting upright, leaning forward at 18 1105   18 1047   99 2 °F (37 3 °C)    131   20   119/74   95 %   None (Room air)   Sitting      PEAK FLOW  6/4 Pre - 250   Post - 300                          6/5 Pre - 210   Post - 250  Inspiraotory  wheezes bilat      Abnormal Labs:     K 3 0  Anion gap 14  BUN 4   WBC 12 06  RBC 3 59  H/H 9 7/30 2     Past Medical/Surgical History:     Diagnosis    Asthma    Migraine    PCOS (polycystic ovarian syndrome)    Encounter for supervision of normal pregnancy in multigravida in second trimester    History of pre-eclampsia in prior pregnancy, currently pregnant in second trimester    History of  delivery, currently pregnant in second trimester    Advanced maternal age in multigravida, second trimester    History of ectopic pregnancy           Resolved Ambulatory Problems     Diagnosis Date Noted    No Resolved Ambulatory Problems          Diagnosis    Anemia    Asthma    Meningitis    Migraines      Admitting Diagnosis: Asthma in adult, unspecified asthma severity, with status asthmaticus [J45 902]     Age/Sex: 40 y o  female     Assessment/Plan:   39 yo [id number] at 27 8 IUP with acute asthma exacerbation secondary to very poorly controlled, severely persistent asthma, now HD # 1 and stable      PLAN:  Acute asthma exacerbation:               Albuterol neb prn              Start symbicort              Resume prednisone 20 mg q daily              pulseox 97 @ 11:45 am, maternal  bpm (just had neb tx), RR 28              Eosinophilia: 0% on CBC w/diff              Peak flow q6h              Continuous pulseox  Seasonal allergies: zyrtec  GI PPx: tums, pepcid  h/o multiple hospitalizations for asthma: pulmonary c/s  Anemia: Hb 9 8; start iron bid w/colace prn  IUP: prenatal vitamin, cEFM, external toco   Hypokalemia: K+ 3 0: s/p K-Dur 40; repeat again in am  Diet: regular  Pain mngmt: tylenol, aqua k  IV fluids: LR   DVT px: OOB, SCDs  Dispo: inpt until stabilized     Admission Orders:  Scheduled Meds:   Current Facility-Administered Medications:  acetaminophen 650 mg Oral Q6H PRN   albuterol 5 mg Nebulization Q4H PRN   budesonide 0 5 mg Nebulization Q12H   calcium carbonate 1,000 mg Oral TID PRN   docusate sodium 100 mg Oral BID   famotidine 20 mg Intravenous Q24H FAVIOLA   ferrous sulfate 325 mg Oral BID AC   guaiFENesin 600 mg Oral Q12H PRN   levalbuterol 1 25 mg Nebulization Q6H   loratadine 10 mg Oral Daily   methylPREDNISolone sodium succinate 40 mg Intravenous Q8H [facility]   ondansetron 4 mg Intravenous Q6H PRN   [START ON 6/6/2018] potassium chloride 40 mEq Oral Once   prenatal multivitamin 1 tablet Oral Daily   sodium chloride 3 mL Nebulization Q6H      Continuous Infusions:  IV LR @ 125 ml/hr from admission to 6/5 @ 0845  PRN Meds:   Acetaminophen x2    Albuterol x1    calcium carbonate    guaiFENesin x1    ondansetron     SCDs  Sputum C&S   Diet regular  Cons Pulmonology   Cons Perinatology   Peak flow q 6 hr   Fetal nonstress test q shift  _______________________ 6/4 Maternal Fetal Medicine Consult   39 yo [id number] at 34 5 p/w acute asthma exacerbation secondary to very poorly controlled, severely persistent asthma, now HD # 1 and stable    PLAN:   Acute asthma exacerbation:               Albuterol neb prn              Start symbicort              Resume prednisone 20 mg q daily              pulseox 97 @ 11:45 am, maternal  bpm (just had neb tx), RR 28              Eosinophilia: 0% on CBC w/diff              Peak flow q6h              Continuous pulseox  Seasonal allergies: zyrtec  GI PPx: tums, pepcid  h/o 30 hospitalizations for asthma: pulmonary c/s  Anemia: Hb 9 8; start iron bid w/colace prn  IUP: prenatal vitamin, cEFM, external toco   Hypokalemia: K+ 3 0: s/p K-Dur 40; repeat again in am  Diet: regular  Pain mngmt: tylenol, aqua k  IV fluids: LR   DVT px: OOB, SCDs   Dispo: inpt until stabilized  ____________________  6/4 Pulmonary Consult 1  Severe persistent asthma with acute exacerbation        *  Increase steroids to solumedrol 40mg Q8hrs        *  D/C symbicort and start pulmicort Q12hrs        *  Continue Xopenex Q6hrs   Stop Atrovent        *  Will need outpatient PFTs as well as RAST/IgE level once post partum   May be candidate for injectable therapy  2  Allergic rhinitis        *  Continue Claritin        *  May need outpatient referral to allergist pending results of above testing  3    GERD        *  Continue IV pepcid per primary team        *  Likely major contributing factor to worsening asthma     ~Will need outpatient pulmonary follow up in our [facility] once baby is delivered and PFTs/blood work can be done  __________________  6/5 Maternal Fetal Medicine Progress Note  Good fetal movement   Time of NST #1: 0600  NST: reactive   Variability: Moderate 6-25 bpm  Accelerations: 15 bpm x 15 secs or greater   Decelerations: none   Contraction Frequency (minutes): 3 over 60 mins  ________________  6/5 Pulmonary Progress Note  1  Severe persistent asthma with acute exacerbation        *  Continue solumedrol 40mg Q8hrs & hope to decrease in AM        *  Continue pulmicort Q12hrs & Xopenex Q6hrs        *  Will need outpatient PFTs as well as RAST/IgE level once post partum   May be candidate for injectable therapy  2  Allergic rhinitis        *  Continue Claritin        *  May need outpatient referral to allergist pending results of above testing  3    GERD        *  Continue IV pepcid per primary team        *  Likely major contributing factor to worsening asthma     ~Check sputum culture        Thank you,  [address] [place] [address] in the [facility] by Anatoly Varma for 2017  Network Utilization Review Department  Phone: [phone]; Fax [phone]  ATTENTION: The Network Utilization Review Department is now centralized for our 7 Facilities   Make a note that we have a new phone and fax numbers for our Department  Please call with any questions or concerns to [phone] and carefully follow the prompts so that you are directed to the right person  All voicemails are confidential  Fax any determinations, approvals, denials, and requests for initial or continue stay review clinical to [phone]  Due to HIGH CALL volume, it would be easier if you could please send faxed requests to expedite your requests and in part, help us provide discharge notifications faster    Continued Stay Review     Date: 6/8/18     Vital Signs: /69 (BP Location: Left arm)   Pulse 103   Temp 98 1 °F (36 7 °C) (Oral)   Resp 18   Ht 5' 2" (1 575 m)   Wt 90 9 kg (200 lb 4 8 oz)   SpO2 98%   BMI 36 64 kg/m²      Medications:   Scheduled Meds:      Current Facility-Administered Medications:  acetaminophen 650 mg Oral Q6H PRN Rohan Mora MD   albuterol 2 puff Inhalation Q4H PRN Federico Damico MD   budesonide-formoterol 2 puff Inhalation BID Federico Damico MD   calcium carbonate 1,000 mg Oral TID PRN Rohan Mora MD   docusate sodium 100 mg Oral BID Rohan Mora MD   famotidine 20 mg Intravenous Q24H [facility] Rohan Mora MD   ferrous sulfate 325 mg Oral BID AC Rohan Mora MD   guaiFENesin 600 mg Oral Q12H PRN Rohan Mora MD   loratadine 10 mg Oral Daily Rohan Mora MD   ondansetron 4 mg Intravenous Q6H PRN MD Emili Christensen ON 6/9/2018] predniSONE 40 mg Oral Daily Myke Rendon PA-C   prenatal multivitamin 1 tablet Oral Daily Rohan Mora MD      PRN Meds:   Acetaminophen x2 in last 24 hrs    albuterol    calcium carbonate    guaiFENesin    ondansetron     Abnormal Labs/Diagnostic Results:   Cl 109     Age/Sex: 40 y o  female      Assessment/Plan:   6/8 OB GYN Progress Note  39 yo [id number] at 35 2 p/w acute asthma exacerbation secondary to very poorly controlled, severely persistent asthma, now HD # 5 and stable       PLAN:  Acute asthma exacerbation: per pulmonary  GI PPx: tums, pepcid  Anemia: Hb 9 8; start iron bid w/colace prn  IUP: prenatal vitamin, NST q shift  Hypokalemia: resolved  FEN: regular, LR  Pain mngmt: tylenol  DVT px: OOB, SCDs  Dispo: plan for d/c home today with prednisone taper, symbicort, albuterol  ________________________  6/8 Pulmonary Progress Note  2  Severe persistent asthma with acute exacerbation        *  Start prednisone now   Taper by 10mg every 4 days down to baseline 10-20mg daily as she was taking prior to admission        *  Continue symbicort and would have her remain on this at D/C along with prn albuterol        *  Will need outpatient PFTs as well as RAST/IgE level once post partum   May be candidate for injectable therapy        *  Needs OOB, increase activity   Incentive spirometry Q1hr  3  Allergic rhinitis        *  Continue Claritin        *  May need outpatient referral to allergist pending results of above testing  3    GERD        *  Continue IV pepcid per primary team        *  Likely major contributing factor to worsening asthma     ~Outpatient pulmonary follow up in our [place] office as per D/C instructions     Discharge Plan: dc today   Thank you,  [address] [place] [address] in the [facility] by Anatoly Varma for 2017  Network Utilization Review Department  Phone: [phone]; Fax [phone]  ATTENTION: The Network Utilization Review Department is now centralized for our 7 Facilities  Make a note that we have a new phone and fax numbers for our Department  Please call with any questions or concerns to [phone] and carefully follow the prompts so that you are directed to the right person  All voicemails are confidential  Fax any determinations, approvals, denials, and requests for initial or continue stay review clinical to [phone]   Due to HIGH CALL volume, it would be easier if you could please send faxed requests to expedite your requests and in part, help us provide discharge notifications faster

## 2018-06-18 ENCOUNTER — TELEPHONE (OUTPATIENT)
Dept: PULMONOLOGY | Facility: CLINIC | Age: 38
End: 2018-06-18

## 2019-01-12 ENCOUNTER — APPOINTMENT (EMERGENCY)
Dept: RADIOLOGY | Facility: HOSPITAL | Age: 39
End: 2019-01-12
Payer: MEDICAID

## 2019-01-12 ENCOUNTER — HOSPITAL ENCOUNTER (EMERGENCY)
Facility: HOSPITAL | Age: 39
Discharge: HOME | End: 2019-01-12
Attending: EMERGENCY MEDICINE | Admitting: EMERGENCY MEDICINE
Payer: MEDICAID

## 2019-01-12 VITALS
WEIGHT: 195.77 LBS | HEART RATE: 89 BPM | SYSTOLIC BLOOD PRESSURE: 124 MMHG | TEMPERATURE: 98.1 F | OXYGEN SATURATION: 100 % | RESPIRATION RATE: 22 BRPM | HEIGHT: 62 IN | DIASTOLIC BLOOD PRESSURE: 79 MMHG | BODY MASS INDEX: 36.03 KG/M2

## 2019-01-12 DIAGNOSIS — J45.901 ASTHMA EXACERBATION: Primary | ICD-10-CM

## 2019-01-12 LAB
ANION GAP SERPL CALCULATED.3IONS-SCNC: 9 MMOL/L (ref 4–13)
BASOPHILS # BLD AUTO: 0.03 THOUSANDS/ΜL (ref 0–0.1)
BASOPHILS NFR BLD AUTO: 0 % (ref 0–1)
BUN SERPL-MCNC: 11 MG/DL (ref 5–25)
CALCIUM SERPL-MCNC: 9.7 MG/DL (ref 8.3–10.1)
CHLORIDE SERPL-SCNC: 102 MMOL/L (ref 100–108)
CO2 SERPL-SCNC: 27 MMOL/L (ref 21–32)
CREAT SERPL-MCNC: 0.98 MG/DL (ref 0.6–1.3)
DEPRECATED D DIMER PPP: 318 NG/ML (FEU)
EOSINOPHIL # BLD AUTO: 0.01 THOUSAND/ΜL (ref 0–0.61)
EOSINOPHIL NFR BLD AUTO: 0 % (ref 0–6)
ERYTHROCYTE [DISTWIDTH] IN BLOOD BY AUTOMATED COUNT: 13.9 % (ref 11.6–15.1)
GFR SERPL CREATININE-BSD FRML MDRD: 85 ML/MIN/1.73SQ M
GLUCOSE SERPL-MCNC: 101 MG/DL (ref 65–140)
HCT VFR BLD AUTO: 42.2 % (ref 34.8–46.1)
HGB BLD-MCNC: 13.6 G/DL (ref 11.5–15.4)
IMM GRANULOCYTES # BLD AUTO: 0.05 THOUSAND/UL (ref 0–0.2)
IMM GRANULOCYTES NFR BLD AUTO: 0 % (ref 0–2)
LYMPHOCYTES # BLD AUTO: 1.36 THOUSANDS/ΜL (ref 0.6–4.47)
LYMPHOCYTES NFR BLD AUTO: 12 % (ref 14–44)
MCH RBC QN AUTO: 28.5 PG (ref 26.8–34.3)
MCHC RBC AUTO-ENTMCNC: 32.2 G/DL (ref 31.4–37.4)
MCV RBC AUTO: 89 FL (ref 82–98)
MONOCYTES # BLD AUTO: 0.14 THOUSAND/ΜL (ref 0.17–1.22)
MONOCYTES NFR BLD AUTO: 1 % (ref 4–12)
NEUTROPHILS # BLD AUTO: 9.87 THOUSANDS/ΜL (ref 1.85–7.62)
NEUTS SEG NFR BLD AUTO: 87 % (ref 43–75)
NRBC BLD AUTO-RTO: 0 /100 WBCS
PLATELET # BLD AUTO: 287 THOUSANDS/UL (ref 149–390)
PMV BLD AUTO: 10.7 FL (ref 8.9–12.7)
POTASSIUM SERPL-SCNC: 4.4 MMOL/L (ref 3.5–5.3)
RBC # BLD AUTO: 4.77 MILLION/UL (ref 3.81–5.12)
SODIUM SERPL-SCNC: 138 MMOL/L (ref 136–145)
WBC # BLD AUTO: 11.46 THOUSAND/UL (ref 4.31–10.16)

## 2019-01-12 PROCEDURE — 71046 X-RAY EXAM CHEST 2 VIEWS: CPT

## 2019-01-12 PROCEDURE — 80048 BASIC METABOLIC PNL TOTAL CA: CPT | Performed by: EMERGENCY MEDICINE

## 2019-01-12 PROCEDURE — 36415 COLL VENOUS BLD VENIPUNCTURE: CPT | Performed by: EMERGENCY MEDICINE

## 2019-01-12 PROCEDURE — 85025 COMPLETE CBC W/AUTO DIFF WBC: CPT | Performed by: EMERGENCY MEDICINE

## 2019-01-12 PROCEDURE — 99283 EMERGENCY DEPT VISIT LOW MDM: CPT

## 2019-01-12 PROCEDURE — 85379 FIBRIN DEGRADATION QUANT: CPT | Performed by: EMERGENCY MEDICINE

## 2019-01-12 PROCEDURE — 94640 AIRWAY INHALATION TREATMENT: CPT

## 2019-01-12 RX ORDER — IPRATROPIUM BROMIDE AND ALBUTEROL SULFATE 2.5; .5 MG/3ML; MG/3ML
3 SOLUTION RESPIRATORY (INHALATION) ONCE
Status: COMPLETED | OUTPATIENT
Start: 2019-01-12 | End: 2019-01-12

## 2019-01-12 RX ORDER — PREDNISONE 20 MG/1
40 TABLET ORAL DAILY
Qty: 8 TABLET | Refills: 0 | Status: SHIPPED | OUTPATIENT
Start: 2019-01-12

## 2019-01-12 RX ORDER — AZITHROMYCIN 250 MG/1
TABLET, FILM COATED ORAL
Qty: 6 TABLET | Refills: 0 | Status: SHIPPED | OUTPATIENT
Start: 2019-01-12 | End: 2019-01-16

## 2019-01-12 RX ORDER — PREDNISONE 20 MG/1
40 TABLET ORAL ONCE
Status: COMPLETED | OUTPATIENT
Start: 2019-01-12 | End: 2019-01-12

## 2019-01-12 RX ORDER — NAPROXEN 250 MG/1
500 TABLET ORAL ONCE
Status: COMPLETED | OUTPATIENT
Start: 2019-01-12 | End: 2019-01-12

## 2019-01-12 RX ORDER — MONTELUKAST SODIUM 10 MG/1
10 TABLET ORAL
COMMUNITY

## 2019-01-12 RX ORDER — CETIRIZINE HYDROCHLORIDE 10 MG/1
10 TABLET ORAL DAILY
COMMUNITY

## 2019-01-12 RX ORDER — LIDOCAINE 50 MG/G
1 PATCH TOPICAL ONCE
Status: DISCONTINUED | OUTPATIENT
Start: 2019-01-12 | End: 2019-01-12 | Stop reason: HOSPADM

## 2019-01-12 RX ADMIN — IPRATROPIUM BROMIDE AND ALBUTEROL SULFATE 3 ML: 2.5; .5 SOLUTION RESPIRATORY (INHALATION) at 17:10

## 2019-01-12 RX ADMIN — PREDNISONE 40 MG: 20 TABLET ORAL at 18:26

## 2019-01-12 RX ADMIN — IPRATROPIUM BROMIDE AND ALBUTEROL SULFATE 3 ML: 2.5; .5 SOLUTION RESPIRATORY (INHALATION) at 17:48

## 2019-01-12 RX ADMIN — NAPROXEN 500 MG: 250 TABLET ORAL at 17:48

## 2019-01-12 RX ADMIN — LIDOCAINE 1 PATCH: 50 PATCH TOPICAL at 17:48

## 2019-01-12 NOTE — DISCHARGE INSTRUCTIONS
Asthma   WHAT YOU NEED TO KNOW:   Asthma is a lung disease that makes breathing difficult  Chronic inflammation and reactions to triggers narrow the airways in the lungs  Asthma can become life-threatening if it is not managed  DISCHARGE INSTRUCTIONS:   Return to the emergency department if:   · You have severe shortness of breath  · Your lips or nails turn blue or gray  · The skin around your neck and ribs pulls in with each breath  · You have shortness of breath, even after you take your short-term medicine as directed  · Your peak flow numbers are in the red zone of your AAP  Contact your healthcare provider if:   · You run out of medicine before your next refill is due  · Your symptoms get worse  · You need to take more medicine than usual to control your symptoms  · You have questions or concerns about your condition or care  Medicines:   · Medicines  decrease inflammation, open airways, and make it easier to breathe  Medicines may be inhaled, taken as a pill, or injected  Short-term medicines relieve your symptoms quickly  Long-term medicines are used to prevent future attacks  You may also need medicine to help control your allergies  Ask your healthcare provider for more information about the medicine you are given and how to take it safely  · Take your medicine as directed  Contact your healthcare provider if you think your medicine is not helping or if you have side effects  Tell him of her if you are allergic to any medicine  Keep a list of the medicines, vitamins, and herbs you take  Include the amounts, and when and why you take them  Bring the list or the pill bottles to follow-up visits  Carry your medicine list with you in case of an emergency  Follow up with your healthcare provider as directed: You will need to return to make sure your medicine is working and your symptoms are controlled   You may be referred to an asthma specialist  Lizzie Hill may be asked to keep a record of your peak flow values and bring it with you to your appointments  Write down your questions so you remember to ask them during your visits  Manage your symptoms and prevent future attacks:   · Follow your Asthma Action Plan (AAP)  This is a written plan that you and your healthcare provider create  It explains which medicine you need and when to change doses if necessary  It also explains how you can monitor symptoms and use a peak flow meter  The meter measures how well your lungs are working  · Manage other health conditions , such as allergies, acid reflux, and sleep apnea  · Identify and avoid triggers  These may include pets, dust mites, mold, and cockroaches  · Do not smoke or be around others who smoke  Nicotine and other chemicals in cigarettes and cigars can cause lung damage  Ask your healthcare provider for information if you currently smoke and need help to quit  E-cigarettes or smokeless tobacco still contain nicotine  Talk to your healthcare provider before you use these products  · Ask about the flu vaccine  The flu can make your asthma worse  You may need a yearly flu shot  © 2017 [employer] Information is for End User's use only and may not be sold, redistributed or otherwise used for commercial purposes  All illustrations and images included in CareNotes® are the copyrighted property of A [employer] M [employer]  or Anatoly Varma  The above information is an  only  It is not intended as medical advice for individual conditions or treatments  Talk to your doctor, nurse or pharmacist before following any medical regimen to see if it is safe and effective for you

## 2019-01-12 NOTE — ED PROVIDER NOTES
History  Chief Complaint   Patient presents with    Asthma     asthma exacerbation x 1 week as per pt  prednisone is not helping  , pt has been doing breathing tx every 2 hours with relief for 30 minutes   Cough     cough x 2 weeks  HPI  71-year-old female with history of asthma presents to the emergency department with complaint of asthma exacerbation  Patient states that she has had shortness of breath and wheezing over the past week  During this time, she has been using a breathing treatment every 3 hours with only transient improvement in symptoms  She has been taking 10 mg of prednisone daily for many months  She also states that over the past 3 weeks, she has had persistent cough  Kids at home were sick with cough before she began coughing  On review of systems, patient complains of right foot swelling and pain without recent trauma  She  denies recent fevers, chills, chest pain, abdominal pain, nausea, vomiting, or complaints other than stated above  Patient has been admitted to the hospital in the past due to asthma exacerbations and states that current symptoms are less severe than they typically are when she gets admitted  Prior to Admission Medications   Prescriptions Last Dose Informant Patient Reported? Taking?   acetaminophen (TYLENOL) 500 mg tablet  Self Yes Yes   Sig: Take 1,000 mg by mouth as needed for moderate pain or headaches   albuterol (2 5 mg/3 mL) 0 083 % nebulizer solution  Self Yes Yes   Sig: 3 ml  dx:[id number] 9   albuterol (PROVENTIL HFA,VENTOLIN HFA) 90 mcg/act inhaler   No Yes   Sig: Inhale 2 puffs every 4 (four) hours as needed for wheezing   cetirizine (ZYRTEC ALLERGY) 10 mg tablet   Yes Yes   Sig: Take 10 mg by mouth daily   montelukast (SINGULAIR) 10 mg tablet   Yes Yes   Sig: Take 10 mg by mouth daily at bedtime   predniSONE 10 mg tablet   No Yes   Sig: Please start prednisone taper;  40 mg starting 6/9/18 for 3 days; then   30 mg starting 6/12/18 for 3 days; then   20 mg starting 6/15/18 for 3 days; then   10 mg starting 18 for 3 days; then  stop      Facility-Administered Medications: None       Past Medical History:   Diagnosis Date    Anemia     Asthma     Meningitis     Migraines        Past Surgical History:   Procedure Laterality Date    DILATION AND CURETTAGE OF UTERUS      SURGICALLY INDUCED  BY D&C    ECTOPIC PREGNANCY SURGERY Left     SALPINGECTOMY Left     RESOLVED 2010       Family History   Problem Relation Age of Onset    Breast cancer Mother     Diabetes Mother     Hypertension Mother     Hypertension Father     Asthma Brother     Breast cancer Maternal Grandmother     Leukemia Maternal Grandmother     Hypertension Maternal Grandmother     Hypertension Paternal Grandmother     Heart disease Paternal Grandmother     Alzheimer's disease Paternal Grandmother     Dementia Paternal Grandmother     No Known Problems Paternal Grandfather     Breast cancer Maternal Aunt     Hypertension Family     Leukemia Daughter      I have reviewed and agree with the history as documented  Social History   Substance Use Topics    Smoking status: Never Smoker    Smokeless tobacco: Never Used    Alcohol use No        Review of Systems   Constitutional: Negative for chills and fever  Respiratory: Positive for cough, shortness of breath and wheezing  Cardiovascular: Positive for leg swelling (right foot only)  Negative for chest pain  Gastrointestinal: Negative for abdominal pain, nausea and vomiting  Musculoskeletal: Negative for arthralgias and joint swelling  Skin: Negative for rash and wound  Allergic/Immunologic: Negative for immunocompromised state  Neurological: Negative for headaches  Psychiatric/Behavioral: The patient is not nervous/anxious  All other systems reviewed and are negative  Physical Exam  Physical Exam   Constitutional: She is oriented to person, place, and time  She appears well-nourished   No distress  HENT:   Head: Normocephalic and atraumatic  Eyes: EOM are normal    Neck: Normal range of motion  Neck supple  Cardiovascular: Normal rate and regular rhythm  Pulmonary/Chest: Effort normal  No respiratory distress  She has wheezes  Abdominal: Soft  She exhibits no distension  There is no tenderness  Musculoskeletal: Normal range of motion  Trace edema along dorsum right foot with associated tenderness   Neurological: She is alert and oriented to person, place, and time  Skin: Skin is warm and dry  She is not diaphoretic  Psychiatric: She has a normal mood and affect  Her behavior is normal    Nursing note and vitals reviewed        Vital Signs  ED Triage Vitals [01/12/19 1630]   Temperature Pulse Respirations Blood Pressure SpO2   98 1 °F (36 7 °C) 98 22 146/84 97 %      Temp Source Heart Rate Source Patient Position - Orthostatic VS BP Location FiO2 (%)   Oral Monitor -- -- --      Pain Score       8           Vitals:    01/12/19 1630 01/12/19 1800   BP: 146/84 124/79   Pulse: 98 89       Visual Acuity      ED Medications  Medications   ipratropium-albuterol (DUO-NEB) 0 5-2 5 mg/3 mL inhalation solution 3 mL (3 mL Nebulization Given 1/12/19 1710)   naproxen (NAPROSYN) tablet 500 mg (500 mg Oral Given 1/12/19 1748)   ipratropium-albuterol (DUO-NEB) 0 5-2 5 mg/3 mL inhalation solution 3 mL (3 mL Nebulization Given 1/12/19 1748)   predniSONE tablet 40 mg (40 mg Oral Given 1/12/19 1826)       Diagnostic Studies  Results Reviewed     Procedure Component Value Units Date/Time    D-dimer, quantitative [[id number]]  (Normal) Collected:  01/12/19 1716    Lab Status:  Final result Specimen:  Blood from Arm, Right Updated:  01/12/19 1736     D-Dimer, Quant 318 ng/ml (FEU)     Basic metabolic panel [[id number]] Collected:  01/12/19 1716    Lab Status:  Final result Specimen:  Blood from Arm, Right Updated:  01/12/19 1733     Sodium 138 mmol/L      Potassium 4 4 mmol/L      Chloride 102 mmol/L      CO2 27 mmol/L      ANION GAP 9 mmol/L      BUN 11 mg/dL      Creatinine 0 98 mg/dL      Glucose 101 mg/dL      Calcium 9 7 mg/dL      eGFR 85 ml/min/1 73sq m     Narrative:         National Kidney Disease Education Program recommendations are as follows:  GFR calculation is accurate only with a steady state creatinine  Chronic Kidney disease less than 60 ml/min/1 73 sq  meters  Kidney failure less than 15 ml/min/1 73 sq  meters  CBC and differential [[id number]]  (Abnormal) Collected:  01/12/19 1716    Lab Status:  Final result Specimen:  Blood from Arm, Right Updated:  01/12/19 1723     WBC 11 46 (H) Thousand/uL      RBC 4 77 Million/uL      Hemoglobin 13 6 g/dL      Hematocrit 42 2 %      MCV 89 fL      MCH 28 5 pg      MCHC 32 2 g/dL      RDW 13 9 %      MPV 10 7 fL      Platelets 750 Thousands/uL      nRBC 0 /100 WBCs      Neutrophils Relative 87 (H) %      Immat GRANS % 0 %      Lymphocytes Relative 12 (L) %      Monocytes Relative 1 (L) %      Eosinophils Relative 0 %      Basophils Relative 0 %      Neutrophils Absolute 9 87 (H) Thousands/µL      Immature Grans Absolute 0 05 Thousand/uL      Lymphocytes Absolute 1 36 Thousands/µL      Monocytes Absolute 0 14 (L) Thousand/µL      Eosinophils Absolute 0 01 Thousand/µL      Basophils Absolute 0 03 Thousands/µL                  XR chest 2 views   ED Interpretation by Amna Malave MD (01/12 1724)   No acute cardiopulmonary disease  Final Result by Ruddy Sauer MD (01/13 7254)      No acute cardiopulmonary disease              Workstation performed: [id number]                    Procedures  Procedures       Phone Contacts  ED Phone Contact    ED Course               PERC Rule for PE      Most Recent Value   PERC Rule for PE   Age >=50  0 Filed at: 01/12/2019 1652   HR >=100  0 Filed at: 01/12/2019 1652   O2 Sat on room air < 95%  0 Filed at: 01/12/2019 1652   History of PE or DVT  0 Filed at: 01/12/2019 1652   Recent trauma or surgery  0 Filed at: 01/12/2019 1652   Hemoptysis  0 Filed at: 01/12/2019 1652   Exogenous estrogen  0 Filed at: 01/12/2019 1652   Unilateral leg swelling  1 Filed at: 01/12/2019 1652   PERC Rule for PE Results  1 Filed at: 01/12/2019 1652                      MDM  CritCare Time    Disposition  Final diagnoses:   Asthma exacerbation     Time reflects when diagnosis was documented in both MDM as applicable and the Disposition within this note     Time User Action Codes Description Comment    1/12/2019  6:24 PM Radha Escobedo Dr Asthma exacerbation       ED Disposition     ED Disposition Condition Comment    Discharge  [facility] discharge to home/self care  Condition at discharge: [address]     Follow up With Specialties Details Why Contact Info Additional [address] [facility] Emergency Department Emergency Medicine  As needed, If symptoms worsen [address] [facility] [address]  [phone] [place] ED, [address] [place] [address], [facility]    Pulmonology  Schedule an appointment as soon as possible for a visit             Discharge Medication List as of 1/12/2019  6:26 PM      START taking these medications    Details   azithromycin (ZITHROMAX Z-KOBY) 250 mg tablet Take 2 tablets today then 1 tablet daily x 4 days, Normal      ipratropium (ATROVENT) 0 02 % nebulizer solution Take 1 vial (0 5 mg total) by nebulization 4 (four) times a day, Starting Sat 1/12/2019, Normal      !! predniSONE 20 mg tablet Take 2 tablets (40 mg total) by mouth daily, Starting Sat 1/12/2019, Normal       !! - Potential duplicate medications found  Please discuss with provider  CONTINUE these medications which have NOT CHANGED    Details   acetaminophen (TYLENOL) 500 mg tablet Take 1,000 mg by mouth as needed for moderate pain or headaches, Historical Med      albuterol (2 5 mg/3 mL) 0 083 % nebulizer solution 3 ml  dx:j44 9, Historical Med      albuterol (PROVENTIL HFA,VENTOLIN HFA) 90 mcg/act inhaler Inhale 2 puffs every 4 (four) hours as needed for wheezing, Starting Fri 6/8/2018, Print      cetirizine (ZYRTEC ALLERGY) 10 mg tablet Take 10 mg by mouth daily, Historical Med      montelukast (SINGULAIR) 10 mg tablet Take 10 mg by mouth daily at bedtime, Historical Med      !! predniSONE 10 mg tablet Please start prednisone taper;  40 mg starting 6/9/18 for 3 days; then   30 mg starting 6/12/18 for 3 days; then   20 mg starting 6/15/18 for 3 days; then   10 mg starting 6/18/18 for 3 days; then  stop, Print       !! - Potential duplicate medications found  Please discuss with provider  No discharge procedures on file      ED Provider  Electronically Signed by           Jane Roth MD  01/14/19 3031       Jane Roth MD  01/14/19 4213

## 2019-03-08 ENCOUNTER — APPOINTMENT (EMERGENCY)
Dept: RADIOLOGY | Facility: HOSPITAL | Age: 39
End: 2019-03-08
Payer: MEDICAID

## 2019-03-08 ENCOUNTER — HOSPITAL ENCOUNTER (EMERGENCY)
Facility: HOSPITAL | Age: 39
Discharge: HOME | End: 2019-03-08
Attending: EMERGENCY MEDICINE
Payer: MEDICAID

## 2019-03-08 VITALS
BODY MASS INDEX: 35.9 KG/M2 | HEART RATE: 91 BPM | HEIGHT: 62 IN | SYSTOLIC BLOOD PRESSURE: 141 MMHG | DIASTOLIC BLOOD PRESSURE: 59 MMHG | OXYGEN SATURATION: 97 % | RESPIRATION RATE: 16 BRPM | TEMPERATURE: 98.1 F | WEIGHT: 195.11 LBS

## 2019-03-08 DIAGNOSIS — S93.601A RIGHT FOOT SPRAIN, INITIAL ENCOUNTER: Primary | ICD-10-CM

## 2019-03-08 PROCEDURE — 99283 EMERGENCY DEPT VISIT LOW MDM: CPT

## 2019-03-08 PROCEDURE — 73630 X-RAY EXAM OF FOOT: CPT

## 2019-03-08 RX ORDER — IBUPROFEN 600 MG/1
600 TABLET ORAL ONCE
Status: COMPLETED | OUTPATIENT
Start: 2019-03-08 | End: 2019-03-08

## 2019-03-08 RX ADMIN — IBUPROFEN 600 MG: 600 TABLET ORAL at 20:38

## 2019-03-09 NOTE — ED PROVIDER NOTES
History  Chief Complaint   Patient presents with    Foot Pain     Stepped out of shower, and twisted R ankle  C/o of pain, swelling, and difficulty bearing weight  Patient is a 81-year-old female with past medical history of asthma, presents to the emergency department for evaluation after she injured her right foot  Patient states this evening she was stepping out of the shower and somehow misstepped and her right foot and underneath itself  She denies falling to the ground  She states she has been having pain in the forefoot area both dorsally and plantar aspect  She states she has been able to put minimal weight on it due to the pain  He denies significant swelling, pain in the ankle joint or proximal to the ankle  She denies any paresthesias or discoloration of the skin the right lower extremity  She denies any prior injury to the right foot  She has no other complaints or injuries at this time and rest of review of systems is negative  History provided by:  Patient   used: No        Prior to Admission Medications   Prescriptions Last Dose Informant Patient Reported? Taking?   acetaminophen (TYLENOL) 500 mg tablet  Self Yes No   Sig: Take 1,000 mg by mouth as needed for moderate pain or headaches   albuterol (2 5 mg/3 mL) 0 083 % nebulizer solution  Self Yes No   Sig: 3 ml  dx:j44 9   albuterol (PROVENTIL HFA,VENTOLIN HFA) 90 mcg/act inhaler   No No   Sig: Inhale 2 puffs every 4 (four) hours as needed for wheezing   cetirizine (ZYRTEC ALLERGY) 10 mg tablet   Yes No   Sig: Take 10 mg by mouth daily   ipratropium (ATROVENT) 0 02 % nebulizer solution   No No   Sig: Take 1 vial (0 5 mg total) by nebulization 4 (four) times a day   montelukast (SINGULAIR) 10 mg tablet   Yes No   Sig: Take 10 mg by mouth daily at bedtime   predniSONE 10 mg tablet   No No   Sig: Please start prednisone taper;  40 mg starting 6/9/18 for 3 days; then   30 mg starting 6/12/18 for 3 days; then   20 mg starting 6/15/18 for 3 days; then   10 mg starting 18 for 3 days; then  stop   predniSONE 20 mg tablet   No No   Sig: Take 2 tablets (40 mg total) by mouth daily      Facility-Administered Medications: None       Past Medical History:   Diagnosis Date    Anemia     Asthma     Meningitis     Migraines        Past Surgical History:   Procedure Laterality Date    DILATION AND CURETTAGE OF UTERUS      SURGICALLY INDUCED  BY D&C    ECTOPIC PREGNANCY SURGERY Left     SALPINGECTOMY Left     RESOLVED 2010       Family History   Problem Relation Age of Onset    Breast cancer Mother     Diabetes Mother     Hypertension Mother     Hypertension Father     Asthma Brother     Breast cancer Maternal Grandmother     Leukemia Maternal Grandmother     Hypertension Maternal Grandmother     Hypertension Paternal Grandmother     Heart disease Paternal Grandmother     Alzheimer's disease Paternal Grandmother     Dementia Paternal Grandmother     No Known Problems Paternal Grandfather     Breast cancer Maternal Aunt     Hypertension Family     Leukemia Daughter      I have reviewed and agree with the history as documented  Social History     Tobacco Use    Smoking status: Never Smoker    Smokeless tobacco: Never Used   Substance Use Topics    Alcohol use: No    Drug use: No        Review of Systems   Constitutional: Negative for chills and fever  HENT: Negative for congestion, ear pain, rhinorrhea and sore throat  Respiratory: Negative for cough and shortness of breath  Cardiovascular: Negative for chest pain and palpitations  Gastrointestinal: Negative for abdominal pain, constipation, diarrhea, nausea and vomiting  Genitourinary: Negative for dysuria, flank pain, frequency and hematuria  Musculoskeletal: Negative for back pain and neck pain  +Right foot pain s/p injury   Skin: Negative for color change, pallor, rash and wound     Allergic/Immunologic: Negative for immunocompromised state  Neurological: Negative for dizziness, syncope, weakness, light-headedness, numbness and headaches  Hematological: Negative for adenopathy  Does not bruise/bleed easily  Psychiatric/Behavioral: Negative for confusion and decreased concentration  All other systems reviewed and are negative  Physical Exam  Physical Exam   Constitutional: She is oriented to person, place, and time  She appears well-developed and well-nourished  No distress  HENT:   Head: Normocephalic and atraumatic  Mouth/Throat: Oropharynx is clear and moist    Eyes: Pupils are equal, round, and reactive to light  Conjunctivae and EOM are normal    Neck: Normal range of motion  Neck supple  No JVD present  Cardiovascular: Normal rate, regular rhythm, normal heart sounds and intact distal pulses  Exam reveals no gallop and no friction rub  No murmur heard  Pulmonary/Chest: Effort normal and breath sounds normal  No respiratory distress  She has no wheezes  She has no rales  Abdominal: Soft  She exhibits no distension  There is no tenderness  There is no rebound and no guarding  Musculoskeletal: She exhibits tenderness  She exhibits no edema or deformity  RIGHT LOWER EXTREMITY:  No significant soft tissue swelling of the right ankle or foot  There is exquisite tenderness over the dorsal and plantar aspect of the midfoot and over all 5 metatarsal bones  No tenderness over the toes x5  Patient is able to minimally dorsiflex and plantar flex at the ankle joint but it does reproduce pain in the foot  She has no ankle joint tenderness or ankle joint laxity  No tenderness in the lower leg, fibular head or knee  2+ DP and PT pulse  Gross sensation intact  Neurological: She is alert and oriented to person, place, and time  No gross motor or sensory deficits  Skin: Skin is warm and dry  No rash noted  She is not diaphoretic  No erythema  No pallor  Psychiatric: She has a normal mood and affect  Her behavior is normal    Nursing note and vitals reviewed  Vital Signs  ED Triage Vitals   Temperature Pulse Respirations Blood Pressure SpO2   03/08/19 2015 03/08/19 2015 03/08/19 2015 03/08/19 2015 03/08/19 2015   98 1 °F (36 7 °C) 91 16 141/59 97 %      Temp Source Heart Rate Source Patient Position - Orthostatic VS BP Location FiO2 (%)   03/08/19 2015 -- 03/08/19 2015 03/08/19 2015 --   Oral  Sitting Left arm       Pain Score       03/08/19 2038       8         Vitals:    03/08/19 2015   BP: 141/59   BP Location: Left arm   Pulse: 91   Resp: 16   Temp: 98 1 °F (36 7 °C)   TempSrc: Oral   SpO2: 97%   Weight: 88 5 kg (195 lb 1 7 oz)   Height: 5' 2" (1 575 m)     Visual Acuity      ED Medications  Medications   ibuprofen (MOTRIN) tablet 600 mg (600 mg Oral Given 3/8/19 2038)       Diagnostic Studies  Results Reviewed     None                 XR foot 3+ views RIGHT   ED Interpretation by Donte Steele DO (03/08 2059)   No acute osseous abnormality  Procedures  Procedures       Phone Contacts  ED Phone Contact    ED Course                               MDM  Number of Diagnoses or Management Options  Diagnosis management comments: 20-year-old female presents with right foot pain after she misstepped while stepping out of a shower  Most likely patient has a foot sprain  Will obtain x-ray of the right foot to rule out fracture  If it is just a sprain, will apply an ACE wrap, provide an ortho shoe and crutches  Will refer to Sports Medicine for follow-up         Amount and/or Complexity of Data Reviewed  Tests in the radiology section of CPT®: ordered and reviewed  Independent visualization of images, tracings, or specimens: yes        Disposition  Final diagnoses:   Right foot sprain, initial encounter     Time reflects when diagnosis was documented in both MDM as applicable and the Disposition within this note     Time User Action Codes Description Comment    3/8/2019  9:04 PM Kavitha Somers E Add [[id number] [id number]] Right foot sprain, initial encounter       ED Disposition     ED Disposition Condition Date/Time Comment    Discharge Stable Fri Mar 8, 2019  9:04 PM Geraldine Lemus discharge to home/self care  Follow-up Information     Follow up With Specialties Details Why [address], DO Sports Medicine Schedule an appointment as soon as possible for a visit   [address] [address]  [phone]            Patient's Medications   Discharge Prescriptions    No medications on file     No discharge procedures on file      ED Provider  Electronically Signed by           Maurice Guillen DO  03/08/19 2104

## 2019-03-26 ENCOUNTER — HOSPITAL ENCOUNTER (EMERGENCY)
Facility: HOSPITAL | Age: 39
Discharge: HOME | End: 2019-03-26
Attending: EMERGENCY MEDICINE
Payer: MEDICAID

## 2019-03-26 VITALS
BODY MASS INDEX: 35.46 KG/M2 | SYSTOLIC BLOOD PRESSURE: 140 MMHG | HEART RATE: 106 BPM | DIASTOLIC BLOOD PRESSURE: 88 MMHG | RESPIRATION RATE: 16 BRPM | TEMPERATURE: 98.1 F | HEIGHT: 62 IN | WEIGHT: 192.68 LBS | OXYGEN SATURATION: 94 %

## 2019-03-26 DIAGNOSIS — J11.1 INFLUENZA-LIKE ILLNESS: Primary | ICD-10-CM

## 2019-03-26 PROCEDURE — 99283 EMERGENCY DEPT VISIT LOW MDM: CPT

## 2019-03-26 RX ORDER — BENZONATATE 200 MG/1
200 CAPSULE ORAL 3 TIMES DAILY PRN
Qty: 21 CAPSULE | Refills: 0 | Status: SHIPPED | OUTPATIENT
Start: 2019-03-26 | End: 2019-04-02

## 2019-04-16 ENCOUNTER — APPOINTMENT (EMERGENCY)
Dept: RADIOLOGY | Facility: HOSPITAL | Age: 39
End: 2019-04-16
Payer: MEDICAID

## 2019-04-16 ENCOUNTER — HOSPITAL ENCOUNTER (EMERGENCY)
Facility: HOSPITAL | Age: 39
Discharge: HOME | End: 2019-04-16
Admitting: EMERGENCY MEDICINE
Payer: MEDICAID

## 2019-04-16 VITALS
OXYGEN SATURATION: 100 % | RESPIRATION RATE: 16 BRPM | SYSTOLIC BLOOD PRESSURE: 120 MMHG | WEIGHT: 195.11 LBS | HEIGHT: 62 IN | TEMPERATURE: 98.2 F | BODY MASS INDEX: 35.9 KG/M2 | DIASTOLIC BLOOD PRESSURE: 72 MMHG | HEART RATE: 98 BPM

## 2019-04-16 DIAGNOSIS — S52.502A DISTAL RADIUS FRACTURE, LEFT: Primary | ICD-10-CM

## 2019-04-16 PROCEDURE — 73130 X-RAY EXAM OF HAND: CPT

## 2019-04-16 PROCEDURE — 99284 EMERGENCY DEPT VISIT MOD MDM: CPT | Performed by: PHYSICIAN ASSISTANT

## 2019-04-16 PROCEDURE — 73110 X-RAY EXAM OF WRIST: CPT

## 2019-04-16 PROCEDURE — 99283 EMERGENCY DEPT VISIT LOW MDM: CPT

## 2019-04-16 PROCEDURE — 29125 APPL SHORT ARM SPLINT STATIC: CPT | Performed by: PHYSICIAN ASSISTANT

## 2019-04-16 RX ORDER — OXYCODONE HYDROCHLORIDE AND ACETAMINOPHEN 5; 325 MG/1; MG/1
1 TABLET ORAL EVERY 8 HOURS PRN
Qty: 9 TABLET | Refills: 0 | Status: SHIPPED | OUTPATIENT
Start: 2019-04-16 | End: 2020-03-11

## 2019-06-09 ENCOUNTER — APPOINTMENT (EMERGENCY)
Dept: RADIOLOGY | Facility: HOSPITAL | Age: 39
End: 2019-06-09
Payer: COMMERCIAL

## 2019-06-09 ENCOUNTER — HOSPITAL ENCOUNTER (EMERGENCY)
Facility: HOSPITAL | Age: 39
Discharge: HOME | End: 2019-06-09
Attending: EMERGENCY MEDICINE | Admitting: EMERGENCY MEDICINE
Payer: COMMERCIAL

## 2019-06-09 VITALS
OXYGEN SATURATION: 100 % | RESPIRATION RATE: 16 BRPM | SYSTOLIC BLOOD PRESSURE: 151 MMHG | HEART RATE: 85 BPM | TEMPERATURE: 98.8 F | DIASTOLIC BLOOD PRESSURE: 71 MMHG

## 2019-06-09 DIAGNOSIS — M54.30 SCIATICA: ICD-10-CM
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
## 2019-06-09 PROCEDURE — 99283 EMERGENCY DEPT VISIT LOW MDM: CPT | Performed by: EMERGENCY MEDICINE

## 2019-06-09 PROCEDURE — 99284 EMERGENCY DEPT VISIT MOD MDM: CPT

## 2019-06-09 PROCEDURE — 72100 X-RAY EXAM L-S SPINE 2/3 VWS: CPT

## 2019-06-09 RX ORDER — KETOROLAC TROMETHAMINE 10 MG/1
10 TABLET, FILM COATED ORAL ONCE
Status: COMPLETED | OUTPATIENT
Start: 2019-06-09 | End: 2019-06-09

## 2019-06-09 RX ORDER — CYCLOBENZAPRINE HCL 5 MG
5 TABLET ORAL 3 TIMES DAILY PRN
Qty: 30 TABLET | Refills: 0 | Status: SHIPPED | OUTPATIENT
Start: 2019-06-09 | End: 2020-03-11

## 2019-06-09 RX ORDER — CYCLOBENZAPRINE HCL 5 MG
5 TABLET ORAL 3 TIMES DAILY PRN
Qty: 30 TABLET | Refills: 0 | Status: SHIPPED | OUTPATIENT
Start: 2019-06-09 | End: 2019-06-09 | Stop reason: SDUPTHER

## 2019-06-09 RX ADMIN — KETOROLAC TROMETHAMINE 10 MG: 10 TABLET, FILM COATED ORAL at 19:09

## 2019-06-12 ENCOUNTER — TELEPHONE (OUTPATIENT)
Dept: PHYSICAL THERAPY | Facility: OTHER | Age: 39
End: 2019-06-12

## 2019-09-26 ENCOUNTER — EVALUATION (OUTPATIENT)
Dept: PHYSICAL THERAPY | Facility: CLINIC | Age: 39
End: 2019-09-26
Payer: COMMERCIAL

## 2019-09-26 DIAGNOSIS — M54.40 BACK PAIN OF LUMBAR REGION WITH SCIATICA: ICD-10-CM

## 2019-09-26 DIAGNOSIS — M79.18 LUMBAR MUSCLE PAIN: Primary | ICD-10-CM

## 2019-09-26 PROCEDURE — 97110 THERAPEUTIC EXERCISES: CPT | Performed by: PHYSICAL THERAPIST

## 2019-09-26 PROCEDURE — 97161 PT EVAL LOW COMPLEX 20 MIN: CPT | Performed by: PHYSICAL THERAPIST
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
## 2019-09-30 ENCOUNTER — OFFICE VISIT (OUTPATIENT)
Dept: PHYSICAL THERAPY | Facility: CLINIC | Age: 39
End: 2019-09-30
Payer: COMMERCIAL

## 2019-09-30 DIAGNOSIS — M54.40 BACK PAIN OF LUMBAR REGION WITH SCIATICA: ICD-10-CM

## 2019-09-30 DIAGNOSIS — M79.18 LUMBAR MUSCLE PAIN: Primary | ICD-10-CM

## 2019-09-30 PROCEDURE — 97110 THERAPEUTIC EXERCISES: CPT

## 2019-09-30 PROCEDURE — 97010 HOT OR COLD PACKS THERAPY: CPT

## 2019-09-30 PROCEDURE — 97140 MANUAL THERAPY 1/> REGIONS: CPT

## 2019-09-30 NOTE — PROGRESS NOTES
Daily Note     Today's date: 2019  Patient name: Sofie Love  : 1980  MRN: [id number]  Referring provider: Blane Inman, PT  Dx:   Encounter Diagnosis     ICD-10-CM    1  Lumbar muscle pain M79 18    2  Back pain of lumbar region with sciatica M54 40                   Subjective: Pt reported 7/10 pain in lumbar spine and states it is a constant pain  Patient is motivated to get better  Objective: See treatment diary below      Assessment: Pt has a tendency to laterally lean her trunk during standing hip abduction and therefore verbal and visual vueing is needed for proper exercise technique  Pt was informed that she may feel some soreness due to the additional exercises and that is normal  Noted tenderness to L lumbar paraspinals during MFR  Pt tolerated all aspects of treatment well having no increases in pain  Slight decrease in pain to her lumbar spine upon leaving therapy today  Plan: Continue with current POC to address pt deficits  Precautions: Complaints of dizziness with standing after sitting for 10 minutes        Manual              MFR 10'            IASTM             ME/jt mobs                                           Exercise Diary             HEP 15'            Nerve Glides  10x           Hip Add Isometric             Hip Abd  10x ea            CS/HR  10x           Bike  6'           Pulleys  20x           // Hip 4-way             Step Ups  10 ea           TA Lifting             Ellipitcal                                                                                                                                      Modalities              Ice             Heat/STIM HP x10'            US

## 2019-10-03 ENCOUNTER — OFFICE VISIT (OUTPATIENT)
Dept: PHYSICAL THERAPY | Facility: CLINIC | Age: 39
End: 2019-10-03
Payer: COMMERCIAL

## 2019-10-03 DIAGNOSIS — M54.40 BACK PAIN OF LUMBAR REGION WITH SCIATICA: ICD-10-CM

## 2019-10-03 DIAGNOSIS — M79.18 LUMBAR MUSCLE PAIN: Primary | ICD-10-CM

## 2019-10-03 PROCEDURE — 97112 NEUROMUSCULAR REEDUCATION: CPT | Performed by: PHYSICAL THERAPIST

## 2019-10-03 PROCEDURE — 97140 MANUAL THERAPY 1/> REGIONS: CPT | Performed by: PHYSICAL THERAPIST

## 2019-10-03 NOTE — PROGRESS NOTES
Daily Note     Today's date: 10/3/2019  Patient name: Brooke Duggan  : 1980  MRN: [id number]  Referring provider: Pito Brambila, PT  Dx:   Encounter Diagnosis     ICD-10-CM    1  Lumbar muscle pain M79 18    2  Back pain of lumbar region with sciatica M54 40        Start Time: 1800  Stop Time: 1900  Total time in clinic (min): 60 minutes    Subjective: Pt reported 7/10 pain in lumbar spine        Objective: See treatment diary below      Assessment: Pt was rotated and had upslip corrected with joint mobs  Patient tolerated TE with some discomfort  Plan: Continue with current POC to address pt deficits  Precautions: Complaints of dizziness with standing after sitting for 10 minutes        Manual  9/30 10/3           MFR 10'            IASTM             ME/jt mobs  10                                         Exercise Diary  9/26 9/30 10/3          HEP 15'            Nerve Glides  10x           Hip Add Isometric             Hip Abd  10x ea            CS/HR  10x 10          Bike  6' 10          Pulleys  20x 30          // Hip 4-way             Step Ups  10 ea           TA Lifting             Ellipitcal             Circuit legs   20                                                                                                                      Modalities              Ice             Heat/STIM HP x10'            US

## 2019-10-07 ENCOUNTER — OFFICE VISIT (OUTPATIENT)
Dept: PHYSICAL THERAPY | Facility: CLINIC | Age: 39
End: 2019-10-07
Payer: COMMERCIAL

## 2019-10-07 DIAGNOSIS — M54.40 BACK PAIN OF LUMBAR REGION WITH SCIATICA: ICD-10-CM

## 2019-10-07 DIAGNOSIS — M79.18 LUMBAR MUSCLE PAIN: Primary | ICD-10-CM

## 2019-10-07 PROCEDURE — 97140 MANUAL THERAPY 1/> REGIONS: CPT

## 2019-10-07 PROCEDURE — 97110 THERAPEUTIC EXERCISES: CPT

## 2019-10-07 NOTE — PROGRESS NOTES
Daily Note     Today's date: 10/7/2019  Patient name: Luis Mendes  : 1980  MRN: [id number]  Referring provider: Jose Raul Blanco PT  Dx:   Encounter Diagnosis     ICD-10-CM    1  Lumbar muscle pain M79 18    2  Back pain of lumbar region with sciatica M54 40        Start Time: 1800  Stop Time: 1845  Total time in clinic (min): 45 minutes    Subjective: Patient stated her back is feeling better since last session  Stated her heated seats helps her back feel better  Objective: See treatment diary below      Assessment: Tolerated treatment well  Patient exhibited good technique with therapeutic exercises  Patient tolerated exercises well with some c/o throbbing in tailbone area  ME to back post exercises to realign back and hips  Good endurance  Plan: Continue per plan of care  Precautions: Complaints of dizziness with standing after sitting for 10 minutes        Manual  9/30 10/3 10/7          MFR 10'            IASTM             ME/jt mobs  10 10'                                        Exercise Diary  9/26 9/30 10/3 10/7         HEP 15'            Nerve Glides  10x           Hip Add Isometric             Hip Abd  10x ea            CS/HR  10x 10 10x         Bike  6 10 10'         Pulleys  20x 30 30x         // Hip 4-way             Step Ups  10 ea  20x         TA Lifting             Ellipitcal             Circuit legs   20 20 full                                                                                                                     Modalities              Ice             Heat/STIM HP x10'            US

## 2019-10-10 ENCOUNTER — OFFICE VISIT (OUTPATIENT)
Dept: PHYSICAL THERAPY | Facility: CLINIC | Age: 39
End: 2019-10-10
Payer: COMMERCIAL

## 2019-10-10 DIAGNOSIS — M54.40 BACK PAIN OF LUMBAR REGION WITH SCIATICA: ICD-10-CM

## 2019-10-10 DIAGNOSIS — M79.18 LUMBAR MUSCLE PAIN: Primary | ICD-10-CM

## 2019-10-10 PROCEDURE — 97140 MANUAL THERAPY 1/> REGIONS: CPT

## 2019-10-10 PROCEDURE — 97110 THERAPEUTIC EXERCISES: CPT

## 2019-10-10 PROCEDURE — 97010 HOT OR COLD PACKS THERAPY: CPT

## 2019-10-10 NOTE — PROGRESS NOTES
Daily Note     Today's date: 10/10/2019  Patient name: Enio Gonsalves  : 1980  MRN: [id number]  Referring provider: Ethyl Gloria  Dx:   Encounter Diagnosis     ICD-10-CM    1  Lumbar muscle pain M79 18    2  Back pain of lumbar region with sciatica M54 40                   Subjective: Pt reports 6/10 pain in lumbar spine more noticeable on the L side today  Objective: See treatment diary below      Assessment: Pt arrived 15 minutes late to therapy and therefore exercise program was modified  Proper exercise technique exhibited therapy today  ME to align hips which has been improving treatment to treatment  Ended therapy with MHx10 minutes to decrease pain and relax lumbar musculature  Plan: Continue with current POC to address pt deficits  Precautions: Complaints of dizziness with standing after sitting for 10 minutes        Manual  9/30 10/3 10/7 10/10         MFR 10'            IASTM             ME/jt mobs  10 10' 8'                                       Exercise Diary  9/26 9/30 10/3 10/7 10/10        HEP 15'            Nerve Glides  10x           Hip Add Isometric             Hip Abd  10x ea            CS/HR  10x 10 10x         Bike  6' 10 10' 10'        Pulleys  20x 30 30x         // Hip 4-way             Step Ups  10 ea  20x         TA Lifting             Ellipitcal             Circuit legs   20 20 full 20x half circuit                                                                                                                    Modalities  9/30 10/10           Ice             Heat/STIM HP x10' HP x10'           US

## 2019-10-24 ENCOUNTER — OFFICE VISIT (OUTPATIENT)
Dept: PHYSICAL THERAPY | Facility: CLINIC | Age: 39
End: 2019-10-24
Payer: COMMERCIAL

## 2019-10-24 DIAGNOSIS — M79.18 LUMBAR MUSCLE PAIN: Primary | ICD-10-CM

## 2019-10-24 DIAGNOSIS — M54.40 BACK PAIN OF LUMBAR REGION WITH SCIATICA: ICD-10-CM

## 2019-10-24 PROCEDURE — 97110 THERAPEUTIC EXERCISES: CPT | Performed by: PHYSICAL THERAPIST

## 2019-10-24 PROCEDURE — 97140 MANUAL THERAPY 1/> REGIONS: CPT | Performed by: PHYSICAL THERAPIST
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
## 2019-10-25 ENCOUNTER — TELEPHONE (OUTPATIENT)
Dept: OBGYN CLINIC | Facility: HOSPITAL | Age: 39
End: 2019-10-25

## 2019-10-25 NOTE — TELEPHONE ENCOUNTER
Called patient & offered her Dr Tao Recinos but patient preferred something more V Myra Roman so she decided on Dr Richmond Garcia in [employer]
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
## 2019-11-06 ENCOUNTER — OFFICE VISIT (OUTPATIENT)
Dept: OBGYN CLINIC | Facility: CLINIC | Age: 39
End: 2019-11-06
Payer: MEDICAID

## 2019-11-06 VITALS
WEIGHT: 192 LBS | DIASTOLIC BLOOD PRESSURE: 86 MMHG | HEIGHT: 62 IN | BODY MASS INDEX: 35.33 KG/M2 | HEART RATE: 103 BPM | SYSTOLIC BLOOD PRESSURE: 122 MMHG

## 2019-11-06 DIAGNOSIS — M51.26 LUMBAR DISC HERNIATION: Primary | ICD-10-CM

## 2019-11-06 DIAGNOSIS — M62.830 LUMBAR PARASPINAL MUSCLE SPASM: ICD-10-CM

## 2019-11-06 DIAGNOSIS — M54.16 RADICULOPATHY, LUMBAR REGION: ICD-10-CM

## 2019-11-06 DIAGNOSIS — M47.816 FACET ARTHROPATHY, LUMBAR: ICD-10-CM

## 2019-11-06 DIAGNOSIS — M51.36 DEGENERATIVE DISC DISEASE, LUMBAR: ICD-10-CM

## 2019-11-06 DIAGNOSIS — M48.061 FORAMINAL STENOSIS OF LUMBAR REGION: ICD-10-CM

## 2019-11-06 PROCEDURE — 99204 OFFICE O/P NEW MOD 45 MIN: CPT | Performed by: FAMILY MEDICINE

## 2019-11-06 RX ORDER — CYCLOBENZAPRINE HCL 10 MG
10 TABLET ORAL 2 TIMES DAILY PRN
Qty: 60 TABLET | Refills: 1 | Status: SHIPPED | OUTPATIENT
Start: 2019-11-06 | End: 2020-03-20 | Stop reason: SDUPTHER

## 2019-11-06 RX ORDER — DICLOFENAC SODIUM 75 MG/1
75 TABLET, DELAYED RELEASE ORAL 2 TIMES DAILY
Qty: 60 TABLET | Refills: 2 | Status: SHIPPED | OUTPATIENT
Start: 2019-11-06 | End: 2020-05-26 | Stop reason: SDUPTHER
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
## 2019-11-06 NOTE — LETTER
November 6, 2019     Patient: Duran Livingston   YOB: 1980   Date of Visit: 11/6/2019       To Whom it May Concern:    Tayo Andre is under my professional care  She was seen in my office on 11/6/2019  If you have any questions or concerns, please don't hesitate to call           Sincerely,          [employer], DO        CC: No Recipients

## 2020-03-11 ENCOUNTER — CONSULT (OUTPATIENT)
Dept: PAIN MEDICINE | Facility: CLINIC | Age: 40
End: 2020-03-11
Payer: COMMERCIAL

## 2020-03-11 VITALS
BODY MASS INDEX: 34.12 KG/M2 | HEART RATE: 105 BPM | RESPIRATION RATE: 18 BRPM | WEIGHT: 185.4 LBS | HEIGHT: 62 IN | SYSTOLIC BLOOD PRESSURE: 114 MMHG | DIASTOLIC BLOOD PRESSURE: 80 MMHG

## 2020-03-11 DIAGNOSIS — M51.36 DEGENERATIVE DISC DISEASE, LUMBAR: ICD-10-CM

## 2020-03-11 DIAGNOSIS — M54.16 LUMBAR RADICULOPATHY: ICD-10-CM

## 2020-03-11 DIAGNOSIS — M48.061 FORAMINAL STENOSIS OF LUMBAR REGION: ICD-10-CM

## 2020-03-11 DIAGNOSIS — M51.26 LUMBAR DISC HERNIATION: ICD-10-CM

## 2020-03-11 DIAGNOSIS — M47.816 FACET ARTHROPATHY, LUMBAR: ICD-10-CM

## 2020-03-11 DIAGNOSIS — G89.4 CHRONIC PAIN SYNDROME: Primary | ICD-10-CM

## 2020-03-11 DIAGNOSIS — M54.16 RADICULOPATHY, LUMBAR REGION: ICD-10-CM

## 2020-03-11 PROBLEM — M51.369 DEGENERATIVE DISC DISEASE, LUMBAR: Status: ACTIVE | Noted: 2020-03-11

## 2020-03-11 PROCEDURE — 99204 OFFICE O/P NEW MOD 45 MIN: CPT | Performed by: ANESTHESIOLOGY
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
## 2020-03-20 DIAGNOSIS — M62.830 LUMBAR PARASPINAL MUSCLE SPASM: ICD-10-CM

## 2020-03-20 RX ORDER — CYCLOBENZAPRINE HCL 10 MG
10 TABLET ORAL 2 TIMES DAILY PRN
Qty: 60 TABLET | Refills: 1 | Status: SHIPPED | OUTPATIENT
Start: 2020-03-20 | End: 2020-05-26 | Stop reason: SDUPTHER

## 2020-05-14 ENCOUNTER — TELEPHONE (OUTPATIENT)
Dept: PAIN MEDICINE | Facility: MEDICAL CENTER | Age: 40
End: 2020-05-14

## 2020-05-26 ENCOUNTER — TELEPHONE (OUTPATIENT)
Dept: RADIOLOGY | Facility: CLINIC | Age: 40
End: 2020-05-26

## 2020-05-26 ENCOUNTER — HOSPITAL ENCOUNTER (OUTPATIENT)
Dept: RADIOLOGY | Facility: CLINIC | Age: 40
Discharge: HOME | End: 2020-05-26
Attending: ANESTHESIOLOGY | Admitting: ANESTHESIOLOGY
Payer: COMMERCIAL

## 2020-05-26 VITALS
RESPIRATION RATE: 20 BRPM | SYSTOLIC BLOOD PRESSURE: 119 MMHG | DIASTOLIC BLOOD PRESSURE: 90 MMHG | TEMPERATURE: 97.6 F | OXYGEN SATURATION: 100 % | HEART RATE: 102 BPM

## 2020-05-26 DIAGNOSIS — M51.26 LUMBAR DISC HERNIATION: ICD-10-CM

## 2020-05-26 DIAGNOSIS — M54.16 LUMBAR RADICULOPATHY: ICD-10-CM

## 2020-05-26 DIAGNOSIS — M62.830 LUMBAR PARASPINAL MUSCLE SPASM: ICD-10-CM

## 2020-05-26 DIAGNOSIS — M47.816 FACET ARTHROPATHY, LUMBAR: ICD-10-CM

## 2020-05-26 PROCEDURE — 62323 NJX INTERLAMINAR LMBR/SAC: CPT | Performed by: ANESTHESIOLOGY

## 2020-05-26 RX ORDER — DICLOFENAC SODIUM 75 MG/1
75 TABLET, DELAYED RELEASE ORAL 2 TIMES DAILY
Qty: 60 TABLET | Refills: 2 | Status: SHIPPED | OUTPATIENT
Start: 2020-05-26 | End: 2020-12-30

## 2020-05-26 RX ORDER — METHYLPREDNISOLONE ACETATE 80 MG/ML
80 INJECTION, SUSPENSION INTRA-ARTICULAR; INTRALESIONAL; INTRAMUSCULAR; PARENTERAL; SOFT TISSUE ONCE
Status: COMPLETED | OUTPATIENT
Start: 2020-05-26 | End: 2020-05-26

## 2020-05-26 RX ORDER — 0.9 % SODIUM CHLORIDE 0.9 %
5 VIAL (ML) INJECTION ONCE
Status: COMPLETED | OUTPATIENT
Start: 2020-05-26 | End: 2020-05-26

## 2020-05-26 RX ORDER — LIDOCAINE HYDROCHLORIDE 10 MG/ML
5 INJECTION, SOLUTION EPIDURAL; INFILTRATION; INTRACAUDAL; PERINEURAL ONCE
Status: COMPLETED | OUTPATIENT
Start: 2020-05-26 | End: 2020-05-26

## 2020-05-26 RX ORDER — CYCLOBENZAPRINE HCL 10 MG
10 TABLET ORAL 2 TIMES DAILY PRN
Qty: 60 TABLET | Refills: 1 | Status: SHIPPED | OUTPATIENT
Start: 2020-05-26 | End: 2020-12-30

## 2020-05-26 RX ADMIN — LIDOCAINE HYDROCHLORIDE 5 ML: 10 INJECTION, SOLUTION EPIDURAL; INFILTRATION; INTRACAUDAL; PERINEURAL at 14:14

## 2020-05-26 RX ADMIN — Medication 5 ML: at 14:13

## 2020-05-26 RX ADMIN — METHYLPREDNISOLONE ACETATE 80 MG: 80 INJECTION, SUSPENSION INTRA-ARTICULAR; INTRALESIONAL; INTRAMUSCULAR; PARENTERAL; SOFT TISSUE at 14:15

## 2020-06-02 ENCOUNTER — TELEPHONE (OUTPATIENT)
Dept: PAIN MEDICINE | Facility: CLINIC | Age: 40
End: 2020-06-02

## 2020-06-30 ENCOUNTER — TELEPHONE (OUTPATIENT)
Dept: PAIN MEDICINE | Facility: CLINIC | Age: 40
End: 2020-06-30

## 2020-07-14 NOTE — TELEPHONE ENCOUNTER
Patient called back upset, she said that she has been waiting for 2 weeks to get scheduled for a procedure  No has called her back I informed that someone will call her back the this time  Please follow up with patient asap thank you

## 2020-07-16 NOTE — TELEPHONE ENCOUNTER
Scheduled LESI on Tues 07/21/20 arr at 15:30  Went over pre procedure instructions, no vaccinations 2 weeks prior/post proc, NPO 1 hr prior, if sick or on abx needs to call to rs, wear loose, comf clothing- no buttons/zippers, needs   Pt verbalized understanding  COVID disclaimer/ screening completed  Pt states they have NOT had COVID  Because of possible immunosuppression due to steroid, pt is aware that he should practice more strict social distancing, masking, handwashing for 2-3 weeks following procedure  Reviewed check in process with pt- will enter building no earlier than arrival time given, agreed to wear mask for duration of appt,  will wait in car

## 2020-07-16 NOTE — TELEPHONE ENCOUNTER
Spoke to pt, requesting repeat LESI- her pain returned about a week and a half ago, same as prior to last injection, and she rates the pain at 8/10 constantly  Is this pt OK for repeat?

## 2020-07-21 ENCOUNTER — HOSPITAL ENCOUNTER (OUTPATIENT)
Dept: RADIOLOGY | Facility: CLINIC | Age: 40
Discharge: HOME | End: 2020-07-21
Admitting: ANESTHESIOLOGY
Payer: COMMERCIAL

## 2020-07-21 VITALS
HEART RATE: 107 BPM | SYSTOLIC BLOOD PRESSURE: 119 MMHG | TEMPERATURE: 99 F | OXYGEN SATURATION: 98 % | DIASTOLIC BLOOD PRESSURE: 79 MMHG | RESPIRATION RATE: 20 BRPM

## 2020-07-21 DIAGNOSIS — M51.26 DISPLACEMENT OF LUMBAR INTERVERTEBRAL DISC: ICD-10-CM

## 2020-07-21 DIAGNOSIS — M54.16 LUMBAR RADICULOPATHY: ICD-10-CM

## 2020-07-21 PROCEDURE — 62323 NJX INTERLAMINAR LMBR/SAC: CPT | Performed by: ANESTHESIOLOGY

## 2020-07-21 RX ORDER — LIDOCAINE HYDROCHLORIDE 10 MG/ML
5 INJECTION, SOLUTION EPIDURAL; INFILTRATION; INTRACAUDAL; PERINEURAL ONCE
Status: COMPLETED | OUTPATIENT
Start: 2020-07-21 | End: 2020-07-21

## 2020-07-21 RX ORDER — METHYLPREDNISOLONE ACETATE 80 MG/ML
80 INJECTION, SUSPENSION INTRA-ARTICULAR; INTRALESIONAL; INTRAMUSCULAR; PARENTERAL; SOFT TISSUE ONCE
Status: COMPLETED | OUTPATIENT
Start: 2020-07-21 | End: 2020-07-21

## 2020-07-21 RX ORDER — 0.9 % SODIUM CHLORIDE 0.9 %
5 VIAL (ML) INJECTION ONCE
Status: COMPLETED | OUTPATIENT
Start: 2020-07-21 | End: 2020-07-21

## 2020-07-21 RX ADMIN — METHYLPREDNISOLONE ACETATE 80 MG: 80 INJECTION, SUSPENSION INTRA-ARTICULAR; INTRALESIONAL; INTRAMUSCULAR; PARENTERAL; SOFT TISSUE at 15:59

## 2020-07-21 RX ADMIN — IOHEXOL 1 ML: 300 INJECTION, SOLUTION INTRAVENOUS at 15:58

## 2020-07-21 RX ADMIN — LIDOCAINE HYDROCHLORIDE 3 ML: 10 INJECTION, SOLUTION EPIDURAL; INFILTRATION; INTRACAUDAL; PERINEURAL at 15:57

## 2020-07-21 RX ADMIN — Medication 3 ML: at 15:57

## 2020-07-21 NOTE — H&P
History of Present Illness: The patient is a 46 y o  female who presents with complaints of low back pain    Patient Active Problem List   Diagnosis    Asthma    Migraine    PCOS (polycystic ovarian syndrome)    Encounter for supervision of normal pregnancy in multigravida in second trimester    History of pre-eclampsia in prior pregnancy, currently pregnant in second trimester    History of  delivery, currently pregnant in second trimester    Advanced maternal age in multigravida, second trimester    History of ectopic pregnancy    Lumbar disc herniation    Facet arthropathy, lumbar    Degenerative disc disease, lumbar    Foraminal stenosis of lumbar region    Lumbar radiculopathy    Chronic pain syndrome       Past Medical History:   Diagnosis Date    Anemia     Asthma     Meningitis     Migraines        Past Surgical History:   Procedure Laterality Date    DILATION AND CURETTAGE OF UTERUS      SURGICALLY INDUCED  BY D&C    ECTOPIC PREGNANCY SURGERY Left     SALPINGECTOMY Left     RESOLVED          Current Outpatient Medications:     albuterol (2 5 mg/3 mL) 0 083 % nebulizer solution, 3 ml  dx:j44 9, Disp: , Rfl:     albuterol (PROVENTIL HFA,VENTOLIN HFA) 90 mcg/act inhaler, Inhale 2 puffs every 4 (four) hours as needed for wheezing, Disp: 1 Inhaler, Rfl: 11    cetirizine (ZYRTEC ALLERGY) 10 mg tablet, Take 10 mg by mouth daily, Disp: , Rfl:     cyclobenzaprine (FLEXERIL) 10 mg tablet, Take 1 tablet (10 mg total) by mouth 2 (two) times a day as needed for muscle spasms, Disp: 60 tablet, Rfl: 1    diclofenac (VOLTAREN) 75 mg EC tablet, Take 1 tablet (75 mg total) by mouth 2 (two) times a day, Disp: 60 tablet, Rfl: 2    ipratropium (ATROVENT) 0 02 % nebulizer solution, Take 1 vial (0 5 mg total) by nebulization 4 (four) times a day, Disp: 20 vial, Rfl: 0    montelukast (SINGULAIR) 10 mg tablet, Take 10 mg by mouth daily at bedtime, Disp: , Rfl:     predniSONE 10 mg tablet, Please start prednisone taper; 40 mg starting 6/9/18 for 3 days; then  30 mg starting 6/12/18 for 3 days; then  20 mg starting 6/15/18 for 3 days; then  10 mg starting 6/18/18 for 3 days; then stop, Disp: 30 tablet, Rfl: 0    predniSONE 20 mg tablet, Take 2 tablets (40 mg total) by mouth daily, Disp: 8 tablet, Rfl: 0    Current Facility-Administered Medications:     iohexol (OMNIPAQUE) 300 mg/mL injection 50 mL, 50 mL, Epidural, Once, Yamila Reyes MD    lidocaine (PF) (XYLOCAINE-MPF) 1 % injection 5 mL, 5 mL, Perineural, Once, Yamila Reyes MD    methylPREDNISolone acetate (DEPO-MEDROL) injection 80 mg, 80 mg, Epidural, Once, Yamila Reyes MD    sodium chloride (PF) 0 9 % injection 5 mL, 5 mL, Epidural, Once, Yamila Reyes MD    Allergies   Allergen Reactions    Shellfish-Derived Products Anaphylaxis    Pollen Extract Other (See Comments)    Shellfish-Derived Products        Physical Exam: There were no vitals filed for this visit  General: Awake, Alert, Oriented x 3, Mood and affect appropriate  Respiratory: Respirations even and unlabored  Cardiovascular: Peripheral pulses intact; no edema  Musculoskeletal Exam:  Within normal limits    ASA Score:  2    Patient/Chart Verification  Patient ID Verified: Verbal  ID Band Applied: No  Consents Confirmed: Procedural, To be obtained in the Pre-Procedure area  H&P( within 30 days) Verified: To be obtained in the Pre-Procedure area  Interval H&P(within 24 hr) Complete (required for Outpatients and Surgery Admit only): To be obtained in the Pre-Procedure area  Allergies Reviewed: Yes(Shellfish)  Anticoag/NSAID held?: NA  Currently on antibiotics?: No    Assessment:   1  Displacement of lumbar intervertebral disc    2   Lumbar radiculopathy        Plan: repeat JASON

## 2020-07-21 NOTE — DISCHARGE INSTR - LAB
Epidural Steroid Injection   WHAT YOU NEED TO KNOW:   An epidural steroid injection (ABHINAV) is a procedure to inject steroid medicine into the epidural space  The epidural space is between your spinal cord and vertebrae  Steroids reduce inflammation and fluid buildup in your spine that may be causing pain  You may be given pain medicine along with the steroids  ACTIVITY  · Do not drive or operate machinery today  · No strenuous activity today - bending, lifting, etc   · You may resume normal activites starting tomorrow - start slowly and as tolerated  · You may shower today, but no tub baths or hot tubs  · You may have numbness for several hours from the local anesthetic  Please use caution and common sense, especially with weight-bearing activities  CARE OF THE INJECTION SITE  · If you have soreness or pain, apply ice to the area today (20 minutes on/20 minutes off)  · Starting tomorrow, you may use warm, moist heat or ice if needed  · You may have an increase or change in your discomfort for 36-48 hours after your treatment  · Apply ice and continue with any pain medication you have been prescribed  · Notify [facility] if you have any of the following: redness, drainage, swelling, headache, stiff neck or fever above 100°F     SPECIAL INSTRUCTIONS  · Our office will contact you in approximately 7 days for a progress report  MEDICATIONS  · Continue to take all routine medications  · Our office may have instructed you to hold some medications  If you have a problem specifically related to your procedure, please call our office at [phone]  Problems not related to your procedure should be directed to your primary care physician

## 2020-07-28 ENCOUNTER — TELEPHONE (OUTPATIENT)
Dept: PAIN MEDICINE | Facility: CLINIC | Age: 40
End: 2020-07-28

## 2020-11-18 ENCOUNTER — TELEPHONE (OUTPATIENT)
Dept: OBGYN CLINIC | Facility: MEDICAL CENTER | Age: 40
End: 2020-11-18

## 2020-11-25 ENCOUNTER — HOSPITAL ENCOUNTER (EMERGENCY)
Facility: HOSPITAL | Age: 40
Discharge: HOME | End: 2020-11-25
Attending: EMERGENCY MEDICINE | Admitting: EMERGENCY MEDICINE
Payer: COMMERCIAL

## 2020-11-25 ENCOUNTER — APPOINTMENT (EMERGENCY)
Dept: RADIOLOGY | Facility: HOSPITAL | Age: 40
End: 2020-11-25
Payer: COMMERCIAL

## 2020-11-25 VITALS
BODY MASS INDEX: 40.29 KG/M2 | HEIGHT: 62 IN | RESPIRATION RATE: 16 BRPM | SYSTOLIC BLOOD PRESSURE: 149 MMHG | WEIGHT: 218.92 LBS | DIASTOLIC BLOOD PRESSURE: 82 MMHG | HEART RATE: 106 BPM | OXYGEN SATURATION: 99 % | TEMPERATURE: 98 F

## 2020-11-25 DIAGNOSIS — S92.351A FRACTURE OF BASE OF FIFTH METATARSAL BONE OF RIGHT FOOT: Primary | ICD-10-CM

## 2020-11-25 PROCEDURE — 99283 EMERGENCY DEPT VISIT LOW MDM: CPT

## 2020-11-25 PROCEDURE — 73630 X-RAY EXAM OF FOOT: CPT

## 2020-11-25 PROCEDURE — 99284 EMERGENCY DEPT VISIT MOD MDM: CPT | Performed by: EMERGENCY MEDICINE

## 2020-11-25 PROCEDURE — 29515 APPLICATION SHORT LEG SPLINT: CPT | Performed by: EMERGENCY MEDICINE

## 2020-11-26 ENCOUNTER — HOSPITAL ENCOUNTER (EMERGENCY)
Facility: HOSPITAL | Age: 40
Discharge: HOME | End: 2020-11-26
Attending: EMERGENCY MEDICINE
Payer: COMMERCIAL

## 2020-11-26 VITALS
DIASTOLIC BLOOD PRESSURE: 96 MMHG | SYSTOLIC BLOOD PRESSURE: 140 MMHG | OXYGEN SATURATION: 99 % | RESPIRATION RATE: 18 BRPM | HEART RATE: 109 BPM | TEMPERATURE: 98.6 F

## 2020-11-26 DIAGNOSIS — S92.902A FOOT FRACTURE, LEFT: Primary | ICD-10-CM

## 2020-11-26 PROCEDURE — 99283 EMERGENCY DEPT VISIT LOW MDM: CPT

## 2020-11-26 PROCEDURE — 29515 APPLICATION SHORT LEG SPLINT: CPT | Performed by: EMERGENCY MEDICINE

## 2020-11-26 PROCEDURE — 99282 EMERGENCY DEPT VISIT SF MDM: CPT | Performed by: EMERGENCY MEDICINE

## 2020-12-01 ENCOUNTER — APPOINTMENT (OUTPATIENT)
Dept: RADIOLOGY | Facility: CLINIC | Age: 40
End: 2020-12-01
Payer: COMMERCIAL

## 2020-12-01 ENCOUNTER — OFFICE VISIT (OUTPATIENT)
Dept: OBGYN CLINIC | Facility: CLINIC | Age: 40
End: 2020-12-01
Payer: COMMERCIAL

## 2020-12-01 VITALS
HEIGHT: 62 IN | WEIGHT: 207 LBS | SYSTOLIC BLOOD PRESSURE: 137 MMHG | DIASTOLIC BLOOD PRESSURE: 94 MMHG | TEMPERATURE: 96.7 F | RESPIRATION RATE: 20 BRPM | BODY MASS INDEX: 38.09 KG/M2 | HEART RATE: 89 BPM

## 2020-12-01 DIAGNOSIS — M79.671 PAIN IN RIGHT FOOT: ICD-10-CM

## 2020-12-01 DIAGNOSIS — M79.671 PAIN IN RIGHT FOOT: Primary | ICD-10-CM

## 2020-12-01 DIAGNOSIS — S92.354A NONDISPLACED FRACTURE OF FIFTH METATARSAL BONE, RIGHT FOOT, INITIAL ENCOUNTER FOR CLOSED FRACTURE: ICD-10-CM

## 2020-12-01 PROCEDURE — 73630 X-RAY EXAM OF FOOT: CPT

## 2020-12-01 PROCEDURE — 99203 OFFICE O/P NEW LOW 30 MIN: CPT | Performed by: ORTHOPAEDIC SURGERY

## 2020-12-01 RX ORDER — SUMATRIPTAN 25 MG/1
TABLET, FILM COATED ORAL
COMMUNITY
Start: 2020-10-22

## 2020-12-01 RX ORDER — TOPIRAMATE 50 MG/1
50 TABLET, FILM COATED ORAL
COMMUNITY
Start: 2020-10-22

## 2020-12-01 RX ORDER — TIOTROPIUM BROMIDE INHALATION SPRAY 1.56 UG/1
2 SPRAY, METERED RESPIRATORY (INHALATION)
COMMUNITY
Start: 2020-10-22

## 2020-12-01 RX ORDER — PHENTERMINE HYDROCHLORIDE 37.5 MG/1
37.5 CAPSULE ORAL
COMMUNITY
Start: 2020-10-22

## 2020-12-01 RX ORDER — BUDESONIDE AND FORMOTEROL FUMARATE DIHYDRATE 160; 4.5 UG/1; UG/1
2 AEROSOL RESPIRATORY (INHALATION)
COMMUNITY
Start: 2020-10-22

## 2020-12-30 ENCOUNTER — OFFICE VISIT (OUTPATIENT)
Dept: OBGYN CLINIC | Facility: CLINIC | Age: 40
End: 2020-12-30
Payer: COMMERCIAL

## 2020-12-30 ENCOUNTER — APPOINTMENT (OUTPATIENT)
Dept: RADIOLOGY | Facility: CLINIC | Age: 40
End: 2020-12-30
Payer: COMMERCIAL

## 2020-12-30 VITALS
WEIGHT: 207 LBS | HEART RATE: 106 BPM | SYSTOLIC BLOOD PRESSURE: 127 MMHG | BODY MASS INDEX: 38.09 KG/M2 | DIASTOLIC BLOOD PRESSURE: 86 MMHG | HEIGHT: 62 IN

## 2020-12-30 DIAGNOSIS — S92.354A NONDISPLACED FRACTURE OF FIFTH METATARSAL BONE, RIGHT FOOT, INITIAL ENCOUNTER FOR CLOSED FRACTURE: Primary | ICD-10-CM

## 2020-12-30 DIAGNOSIS — S92.354A NONDISPLACED FRACTURE OF FIFTH METATARSAL BONE, RIGHT FOOT, INITIAL ENCOUNTER FOR CLOSED FRACTURE: ICD-10-CM

## 2020-12-30 PROCEDURE — 99213 OFFICE O/P EST LOW 20 MIN: CPT | Performed by: ORTHOPAEDIC SURGERY

## 2020-12-30 PROCEDURE — 73630 X-RAY EXAM OF FOOT: CPT

## 2021-07-01 ENCOUNTER — APPOINTMENT (EMERGENCY)
Dept: RADIOLOGY | Facility: HOSPITAL | Age: 41
End: 2021-07-01
Payer: COMMERCIAL

## 2021-07-01 ENCOUNTER — HOSPITAL ENCOUNTER (EMERGENCY)
Facility: HOSPITAL | Age: 41
Discharge: HOME | End: 2021-07-01
Attending: EMERGENCY MEDICINE
Payer: COMMERCIAL

## 2021-07-01 VITALS
TEMPERATURE: 99 F | DIASTOLIC BLOOD PRESSURE: 73 MMHG | OXYGEN SATURATION: 98 % | HEART RATE: 118 BPM | RESPIRATION RATE: 18 BRPM | SYSTOLIC BLOOD PRESSURE: 146 MMHG | WEIGHT: 205.03 LBS | BODY MASS INDEX: 37.5 KG/M2

## 2021-07-01 DIAGNOSIS — S90.129A CONTUSION OF TOE: Primary | ICD-10-CM

## 2021-07-01 PROCEDURE — 99284 EMERGENCY DEPT VISIT MOD MDM: CPT | Performed by: PHYSICIAN ASSISTANT

## 2021-07-01 PROCEDURE — 99283 EMERGENCY DEPT VISIT LOW MDM: CPT

## 2021-07-01 PROCEDURE — 73630 X-RAY EXAM OF FOOT: CPT

## 2021-07-01 NOTE — ED PROVIDER NOTES
History  Chief Complaint   Patient presents with    Toe Injury     pt reports reports injurying her L big toe while on vacation  pt c/o numbness, tingling, and swelling     Patient is a 81-year-old female presents emergency department complaints of left great toe pain  Patient states that on June 22nd she was on vacation and accidentally hit her foot on the side of a ship  Patient states that she has had pain and swelling since that time  She is able to ambulate  Prior to Admission Medications   Prescriptions Last Dose Informant Patient Reported? Taking?    SUMAtriptan (IMITREX) 25 mg tablet  Self Yes No   Sig: Take 2 tablets at onset of migraine and one tablet every 2 hours as needed, not more than 5 tablets in 24 hours   albuterol (2 5 mg/3 mL) 0 083 % nebulizer solution  Self Yes No   Sig: every 6 (six) hours as needed    albuterol (PROVENTIL HFA,VENTOLIN HFA) 90 mcg/act inhaler  Self No No   Sig: Inhale 2 puffs every 4 (four) hours as needed for wheezing   budesonide-formoterol (SYMBICORT) 160-4 5 mcg/act inhaler  Self Yes No   Sig: Inhale 2 puffs   cetirizine (ZYRTEC ALLERGY) 10 mg tablet  Self Yes No   Sig: Take 10 mg by mouth daily   ipratropium (ATROVENT) 0 02 % nebulizer solution  Self No No   Sig: Take 1 vial (0 5 mg total) by nebulization 4 (four) times a day   montelukast (SINGULAIR) 10 mg tablet  Self Yes No   Sig: Take 10 mg by mouth daily at bedtime   phentermine 37 5 MG capsule  Self Yes No   Sig: Take 37 5 mg by mouth   predniSONE 20 mg tablet  Self No No   Sig: Take 2 tablets (40 mg total) by mouth daily   tiotropium (Spiriva Respimat) 1 25 MCG/ACT AERS inhaler  Self Yes No   Sig: Inhale 2 puffs   topiramate (TOPAMAX) 50 MG tablet  Self Yes No   Sig: Take 50 mg by mouth      Facility-Administered Medications: None       Past Medical History:   Diagnosis Date    Anemia     Asthma     Meningitis     Migraines        Past Surgical History:   Procedure Laterality Date    DILATION AND CURETTAGE OF UTERUS      SURGICALLY INDUCED  BY D&C    ECTOPIC PREGNANCY SURGERY Left     SALPINGECTOMY Left     RESOLVED 2010       Family History   Problem Relation Age of Onset    Breast cancer Mother     Diabetes Mother     Hypertension Mother     Osteoporosis Mother     Hypertension Father     Asthma Brother     Breast cancer Maternal Grandmother     Leukemia Maternal Grandmother     Hypertension Maternal Grandmother     Hypertension Paternal Grandmother     Heart disease Paternal Grandmother     Alzheimer's disease Paternal Grandmother     Dementia Paternal Grandmother     No Known Problems Paternal Grandfather     Breast cancer Maternal Aunt     Hypertension Family     Leukemia Daughter      I have reviewed and agree with the history as documented  E-Cigarette/Vaping    E-Cigarette Use Never User      E-Cigarette/Vaping Substances     Social History     Tobacco Use    Smoking status: Never Smoker    Smokeless tobacco: Never Used   Vaping Use    Vaping Use: Never used   Substance Use Topics    Alcohol use: Yes     Comment: socially    Drug use: No       Review of Systems   Constitutional: Negative for fever  Respiratory: Negative for shortness of breath  Cardiovascular: Negative for chest pain  Musculoskeletal: Positive for arthralgias  All other systems reviewed and are negative  Physical Exam  Physical Exam  Vitals reviewed  Constitutional:       Appearance: Normal appearance  Cardiovascular:      Rate and Rhythm: Normal rate and regular rhythm  Pulses: Normal pulses  Heart sounds: Normal heart sounds  Pulmonary:      Effort: Pulmonary effort is normal       Breath sounds: Normal breath sounds  Abdominal:      General: Bowel sounds are normal       Palpations: Abdomen is soft  Musculoskeletal:        Feet:    Skin:     General: Skin is warm  Capillary Refill: Capillary refill takes less than 2 seconds     Neurological:      General: No focal deficit present  Mental Status: She is alert and oriented to person, place, and time  Psychiatric:         Mood and Affect: Mood normal          Behavior: Behavior normal          Thought Content: Thought content normal          Judgment: Judgment normal          Vital Signs  ED Triage Vitals [07/01/21 1158]   Temperature Pulse Respirations Blood Pressure SpO2   99 °F (37 2 °C) (!) 118 18 146/73 98 %      Temp Source Heart Rate Source Patient Position - Orthostatic VS BP Location FiO2 (%)   Oral Monitor Sitting Left arm --      Pain Score       8           Vitals:    07/01/21 1158   BP: 146/73   Pulse: (!) 118   Patient Position - Orthostatic VS: Sitting         Visual Acuity      ED Medications  Medications - No data to display    Diagnostic Studies  Results Reviewed     None                 XR foot 3+ views LEFT   Final Result by Chelsea Cortez MD (07/01 1456)      No acute osseous abnormality  Workstation performed: [id number]                    Procedures  Procedures         ED Course                             SBIRT 20yo+      Most Recent Value   SBIRT (24 yo +)   In order to provide better care to our patients, we are screening all of our patients for alcohol and drug use  Would it be okay to ask you these screening questions? No Filed at: 07/01/2021 1206                    MDM  Number of Diagnoses or Management Options  Contusion of toe  Diagnosis management comments: Patient is a 80-year-old female presents emergency department complaints of left great toe pain  Patient states that on June 22nd she was on vacation and accidentally hit her foot on the side of a ship  Patient states that she has had pain and swelling since that time  She is able to ambulate  On examination of her left lower extremity, she is neurovascular intact  There is tenderness to palpation along the left great toe  Range of motion of all digits is full    X-ray images left foot reviewed and does not show any bony abnormality  Findings were discussed with the patient  Recommend anti-inflammatories activity modification  Return parameters discussed  Patient stable for discharge  Amount and/or Complexity of Data Reviewed  Tests in the radiology section of CPT®: ordered and reviewed  Independent visualization of images, tracings, or specimens: yes    Risk of Complications, Morbidity, and/or Mortality  Presenting problems: low  Diagnostic procedures: low  Management options: low    Patient Progress  Patient progress: stable      Disposition  Final diagnoses:   Contusion of toe     Time reflects when diagnosis was documented in both MDM as applicable and the Disposition within this note     Time User Action Codes Description Comment    7/1/2021 12:35 PM Angie Mcgrath Add [Q96 [id number]] Contusion of toe       ED Disposition     ED Disposition Condition Date/Time Comment    Discharge Good Thu Jul 1, 2021 [facility] Miki discharge to home/self care              Follow-up Information     Follow up With Specialties Details Why Contact Info Additional Information    [address] [facility] Emergency Department Emergency Medicine  If symptoms worsen [address] Emergency Department, [address] [place] [address], [id number]          Discharge Medication List as of 7/1/2021 12:35 PM      CONTINUE these medications which have NOT CHANGED    Details   albuterol (2 5 mg/3 mL) 0 083 % nebulizer solution every 6 (six) hours as needed , Starting Tue 7/18/2017, Historical Med      albuterol (PROVENTIL HFA,VENTOLIN HFA) 90 mcg/act inhaler Inhale 2 puffs every 4 (four) hours as needed for wheezing, Starting Fri 6/8/2018, Print      budesonide-formoterol (SYMBICORT) 160-4 5 mcg/act inhaler Inhale 2 puffs, Starting Thu 10/22/2020, Historical Med      cetirizine (ZYRTEC ALLERGY) 10 mg tablet Take 10 mg by mouth daily, Historical Med      ipratropium (ATROVENT) 0 02 % nebulizer solution Take 1 vial (0 5 mg total) by nebulization 4 (four) times a day, Starting Sat 1/12/2019, Normal      montelukast (SINGULAIR) 10 mg tablet Take 10 mg by mouth daily at bedtime, Historical Med      phentermine 37 5 MG capsule Take 37 5 mg by mouth, Starting u 10/22/2020, Historical Med      predniSONE 20 mg tablet Take 2 tablets (40 mg total) by mouth daily, Starting Sat 1/12/2019, Normal      SUMAtriptan (IMITREX) 25 mg tablet Take 2 tablets at onset of migraine and one tablet every 2 hours as needed, not more than 5 tablets in 24 hours, Historical Med      tiotropium (Spiriva Respimat) 1 25 MCG/ACT AERS inhaler Inhale 2 puffs, Starting u 10/22/2020, Historical Med      topiramate (TOPAMAX) 50 MG tablet Take 50 mg by mouth, Starting u 10/22/2020, Historical Med           No discharge procedures on file      PDMP Review     None          ED Provider  Electronically Signed by           Srikanth Torres PA-C  07/01/21 2776

## 2021-07-26 PROCEDURE — 99283 EMERGENCY DEPT VISIT LOW MDM: CPT

## 2021-07-27 ENCOUNTER — APPOINTMENT (EMERGENCY)
Dept: RADIOLOGY | Facility: HOSPITAL | Age: 41
End: 2021-07-27
Payer: COMMERCIAL

## 2021-07-27 ENCOUNTER — HOSPITAL ENCOUNTER (EMERGENCY)
Facility: HOSPITAL | Age: 41
Discharge: HOME | End: 2021-07-27
Attending: EMERGENCY MEDICINE
Payer: COMMERCIAL

## 2021-07-27 VITALS
DIASTOLIC BLOOD PRESSURE: 77 MMHG | OXYGEN SATURATION: 98 % | TEMPERATURE: 98.4 F | RESPIRATION RATE: 18 BRPM | SYSTOLIC BLOOD PRESSURE: 112 MMHG | HEART RATE: 104 BPM

## 2021-07-27 DIAGNOSIS — L03.116 CELLULITIS OF LEFT FOOT: ICD-10-CM

## 2021-07-27 DIAGNOSIS — M79.672 FOOT PAIN, LEFT: Primary | ICD-10-CM

## 2021-07-27 PROCEDURE — 73630 X-RAY EXAM OF FOOT: CPT

## 2021-07-27 PROCEDURE — 99284 EMERGENCY DEPT VISIT MOD MDM: CPT | Performed by: EMERGENCY MEDICINE

## 2021-07-27 RX ORDER — NAPROXEN 500 MG/1
500 TABLET ORAL 2 TIMES DAILY WITH MEALS
Qty: 30 TABLET | Refills: 0 | Status: SHIPPED | OUTPATIENT
Start: 2021-07-27

## 2021-07-27 RX ORDER — CEPHALEXIN 250 MG/1
500 CAPSULE ORAL ONCE
Status: DISCONTINUED | OUTPATIENT
Start: 2021-07-27 | End: 2021-07-27

## 2021-07-27 RX ORDER — IBUPROFEN 400 MG/1
400 TABLET ORAL ONCE
Status: COMPLETED | OUTPATIENT
Start: 2021-07-27 | End: 2021-07-27

## 2021-07-27 RX ORDER — DOXYCYCLINE HYCLATE 100 MG/1
100 CAPSULE ORAL ONCE
Status: COMPLETED | OUTPATIENT
Start: 2021-07-27 | End: 2021-07-27

## 2021-07-27 RX ORDER — NAPROXEN 250 MG/1
500 TABLET ORAL ONCE
Status: COMPLETED | OUTPATIENT
Start: 2021-07-27 | End: 2021-07-27

## 2021-07-27 RX ORDER — DOXYCYCLINE HYCLATE 100 MG/1
100 CAPSULE ORAL 2 TIMES DAILY
Qty: 20 CAPSULE | Refills: 0 | Status: SHIPPED | OUTPATIENT
Start: 2021-07-27 | End: 2021-08-06

## 2021-07-27 RX ADMIN — DOXYCYCLINE 100 MG: 100 CAPSULE ORAL at 01:55

## 2021-07-27 RX ADMIN — NAPROXEN 500 MG: 250 TABLET ORAL at 01:55

## 2021-07-27 RX ADMIN — IBUPROFEN 400 MG: 400 TABLET ORAL at 00:42

## 2021-07-27 NOTE — DISCHARGE INSTRUCTIONS
Take the entire course of antibiotics, 10 days  Doxycycline make you  sensitive to the sun, please use sunscreen or cover-ups so you don't get burned from the sun  Doxy can be irritating to the belly, please take w food  Do not lay down for 30 min after taking your medications

## 2021-07-27 NOTE — ED PROVIDER NOTES
History  Chief Complaint   Patient presents with    Foot Pain     Pt reports left foot pain ongoing for 3 weeks  Pt reports being evaluated 3 weeks ago for pain and being discharged, pt reports pain is worsening  52-year-old female presents with left foot pain, ongoing for the past 3 weeks  She was seen here 3 weeks ago and had an x-ray performed  The initial injury was she hit her foot into something on a boat  She had her lateral foot now has pain over the 5th metatarsal   She states now the pain is over the whole foot and starting to have some pain more proximal as well, into the low calf  No pain w calf squeeze - no calf swelling  Mild erythema to the foot, no tracking, mild swelling of foot  Tenderness of the L lateral foot without deformity, bruising or wound  She does have a few small bruises noted to the L calf - unsure if related  Local tenderness  No redness tracking up the leg  She denies fever, chills or other signs of systemic illness  No prior injury to this foot prior to 3 weeks ago  States she has been taking Tylenol for pain control with minimal relief  Prior to Admission Medications   Prescriptions Last Dose Informant Patient Reported? Taking?    SUMAtriptan (IMITREX) 25 mg tablet  Self Yes No   Sig: Take 2 tablets at onset of migraine and one tablet every 2 hours as needed, not more than 5 tablets in 24 hours   albuterol (2 5 mg/3 mL) 0 083 % nebulizer solution  Self Yes No   Sig: every 6 (six) hours as needed    albuterol (PROVENTIL HFA,VENTOLIN HFA) 90 mcg/act inhaler  Self No No   Sig: Inhale 2 puffs every 4 (four) hours as needed for wheezing   budesonide-formoterol (SYMBICORT) 160-4 5 mcg/act inhaler  Self Yes No   Sig: Inhale 2 puffs   cetirizine (ZYRTEC ALLERGY) 10 mg tablet  Self Yes No   Sig: Take 10 mg by mouth daily   ipratropium (ATROVENT) 0 02 % nebulizer solution  Self No No   Sig: Take 1 vial (0 5 mg total) by nebulization 4 (four) times a day   montelukast (SINGULAIR) 10 mg tablet  Self Yes No   Sig: Take 10 mg by mouth daily at bedtime   phentermine 37 5 MG capsule  Self Yes No   Sig: Take 37 5 mg by mouth   predniSONE 20 mg tablet  Self No No   Sig: Take 2 tablets (40 mg total) by mouth daily   tiotropium (Spiriva Respimat) 1 25 MCG/ACT AERS inhaler  Self Yes No   Sig: Inhale 2 puffs   topiramate (TOPAMAX) 50 MG tablet  Self Yes No   Sig: Take 50 mg by mouth      Facility-Administered Medications: None       Past Medical History:   Diagnosis Date    Anemia     Asthma     Meningitis     Migraines        Past Surgical History:   Procedure Laterality Date    DILATION AND CURETTAGE OF UTERUS      SURGICALLY INDUCED  BY D&C    ECTOPIC PREGNANCY SURGERY Left     SALPINGECTOMY Left     RESOLVED 2010       Family History   Problem Relation Age of Onset    Breast cancer Mother     Diabetes Mother     Hypertension Mother     Osteoporosis Mother     Hypertension Father     Asthma Brother     Breast cancer Maternal Grandmother     Leukemia Maternal Grandmother     Hypertension Maternal Grandmother     Hypertension Paternal Grandmother     Heart disease Paternal Grandmother     Alzheimer's disease Paternal Grandmother     Dementia Paternal Grandmother     No Known Problems Paternal Grandfather     Breast cancer Maternal Aunt     Hypertension Family     Leukemia Daughter      I have reviewed and agree with the history as documented  E-Cigarette/Vaping    E-Cigarette Use Never User      E-Cigarette/Vaping Substances     Social History     Tobacco Use    Smoking status: Never Smoker    Smokeless tobacco: Never Used   Vaping Use    Vaping Use: Never used   Substance Use Topics    Alcohol use: Yes     Comment: socially    Drug use: No       Review of Systems   Constitutional: Negative for chills and fever  Musculoskeletal: Positive for arthralgias ( left foot pain, lateral left foot over 5th metatarsal) and gait problem (Left foot pain)  Negative for joint swelling  Skin: Negative for rash and wound  Neurological: Negative for weakness and numbness  Hematological: Does not bruise/bleed easily  All other systems reviewed and are negative  Physical Exam  Physical Exam  Vitals reviewed  Constitutional:       General: She is not in acute distress  Appearance: She is well-developed  She is not diaphoretic  HENT:      Head: Normocephalic and atraumatic  Eyes:      Conjunctiva/sclera: Conjunctivae normal    Pulmonary:      Effort: Pulmonary effort is normal  No respiratory distress  Musculoskeletal:         General: Swelling (L foot) and tenderness ( left 5th metatarsal and L foot pain) present  No deformity or signs of injury ( no external signs of injury to left foot)  Cervical back: Normal range of motion  Right lower leg: No edema  Left lower leg: No edema  Comments: Pain to left foot, decreased range of motion secondary to pain  Able to move toes but movement of the 5th toe causes pain in the foot  Skin:     General: Skin is warm and dry  Coloration: Skin is not pale  Findings: Bruising ( a few punctate bruises noted to left calf with point tenderness) and erythema (Mild erythema to left lateral foot) present  Neurological:      Mental Status: She is alert and oriented to person, place, and time  Cranial Nerves: No cranial nerve deficit     Psychiatric:         Behavior: Behavior normal          Vital Signs  ED Triage Vitals   Temperature Pulse Respirations Blood Pressure SpO2   07/27/21 0036 07/27/21 0036 07/27/21 0036 07/27/21 0036 07/27/21 0036   98 4 °F (36 9 °C) 104 18 112/77 98 %      Temp Source Heart Rate Source Patient Position - Orthostatic VS BP Location FiO2 (%)   07/27/21 0036 07/27/21 0036 07/27/21 0036 07/27/21 0036 --   Oral Monitor Sitting Left arm       Pain Score       07/27/21 0042       9           Vitals:    07/27/21 0036   BP: 112/77   Pulse: 104   Patient Position - Orthostatic VS: Sitting         Visual Acuity      ED Medications  Medications   ibuprofen (MOTRIN) tablet 400 mg (400 mg Oral Given 7/27/21 0042)   naproxen (NAPROSYN) tablet 500 mg (500 mg Oral Given 7/27/21 0155)   doxycycline hyclate (VIBRAMYCIN) capsule 100 mg (100 mg Oral Given 7/27/21 0155)       Diagnostic Studies  Results Reviewed     None                 XR foot 3+ views LEFT   ED Interpretation by Costa Baker DO (07/27 0138)   No acute osseous abnormality noted                 Procedures  Orthopedic injury treatment    Date/Time: 7/27/2021 4:06 AM  Performed by: Costa Baker DO  Authorized by: Costa Baker DO     Patient Location:  ED  Verbal consent obtained?: Yes    Risks and benefits: Risks, benefits and alternatives were discussed    Consent given by:  Patient  Patient states understanding of procedure being performed: Yes    Relevant documents present and verified: Yes    Radiology Images displayed and confirmed  If images not available, report reviewed: Yes    Patient identity confirmed:  Verbally with patient  Injury location: Left foot  Neurovascular status: Neurovascularly intact    Distal perfusion: normal    Neurological function: normal    Range of motion: reduced    Skeletal traction used?: No    Immobilization: Cast shoe and crutches  Neurovascular status: Neurovascularly intact    Distal perfusion: normal    Neurological function: normal    Range of motion: normal    Range of motion comment:  Splinted  Patient tolerance:  Patient tolerated the procedure well with no immediate complications             ED Course                                           MDM  Number of Diagnoses or Management Options  Cellulitis of left foot  Foot pain, left  Diagnosis management comments: Left foot injury, x-ray indicates no osseous abnormality    Concern for cellulitis or other underlying infection, given salt water exposure, patient is put on doxycycline and advised follow-up outpatient with Dr José Antonio Mendenhall of Podiatry  Vascular return precautions and his signs of systemic infection, worsening infection, worsening pain in foot, swelling in legs, or any other concerning symptoms  The patient has no further questions on discharge and is discharged with anti-inflammatory medication, doxycycline, and outpatient referrals for follow-up  Amount and/or Complexity of Data Reviewed  Tests in the radiology section of CPT®: ordered and reviewed        Disposition  Final diagnoses: Foot pain, left   Cellulitis of left foot     Time reflects when diagnosis was documented in both MDM as applicable and the Disposition within this note     Time User Action Codes Description Comment    7/27/2021  1:43 AM Swathi Martin Add [S80 [id number]] Foot pain, left     7/27/2021  1:44 AM Asael Alva Add [L03 [id number]] Cellulitis of left foot       ED Disposition     ED Disposition Condition Date/Time Comment    Discharge Stable Tue Jul 27, 2021  1:48 AM Editha Hatchet Riddick discharge to home/self care              Follow-up Information     Follow up With Specialties Details Why Contact Info Additional Information    [address] [place] [address] Emergency Department Emergency Medicine  If symptoms worsen [address] [place] [address] Emergency Department, [address] [place] [address] Podiatry Schedule an appointment as soon as possible for a visit  For follow up to ensure improvement, and for further testing and treatment as needed [facility]  [phone]             Discharge Medication List as of 7/27/2021  1:50 AM      START taking these medications    Details   doxycycline hyclate (VIBRAMYCIN) 100 mg capsule Take 1 capsule (100 mg total) by mouth 2 (two) times a day for 10 days, Starting Tue 7/27/2021, Until Fri 8/6/2021, Normal      naproxen (NAPROSYN) 500 mg tablet Take 1 tablet (500 mg total) by mouth 2 (two) times a day with meals As needed for pain in foot, Starting Tue 7/27/2021, Normal         CONTINUE these medications which have NOT CHANGED    Details   albuterol (2 5 mg/3 mL) 0 083 % nebulizer solution every 6 (six) hours as needed , Starting Tue 7/18/2017, Historical Med      albuterol (PROVENTIL HFA,VENTOLIN HFA) 90 mcg/act inhaler Inhale 2 puffs every 4 (four) hours as needed for wheezing, Starting Fri 6/8/2018, Print      budesonide-formoterol (SYMBICORT) 160-4 5 mcg/act inhaler Inhale 2 puffs, Starting Thu 10/22/2020, Historical Med      cetirizine (ZYRTEC ALLERGY) 10 mg tablet Take 10 mg by mouth daily, Historical Med      ipratropium (ATROVENT) 0 02 % nebulizer solution Take 1 vial (0 5 mg total) by nebulization 4 (four) times a day, Starting Sat 1/12/2019, Normal      montelukast (SINGULAIR) 10 mg tablet Take 10 mg by mouth daily at bedtime, Historical Med      phentermine 37 5 MG capsule Take 37 5 mg by mouth, Starting Thu 10/22/2020, Historical Med      predniSONE 20 mg tablet Take 2 tablets (40 mg total) by mouth daily, Starting Sat 1/12/2019, Normal      SUMAtriptan (IMITREX) 25 mg tablet Take 2 tablets at onset of migraine and one tablet every 2 hours as needed, not more than 5 tablets in 24 hours, Historical Med      tiotropium (Spiriva Respimat) 1 25 MCG/ACT AERS inhaler Inhale 2 puffs, Starting Thu 10/22/2020, Historical Med      topiramate (TOPAMAX) 50 MG tablet Take 50 mg by mouth, Starting Thu 10/22/2020, Historical Med           No discharge procedures on file      PDMP Review     None          ED Provider  Electronically Signed by           Brooke Mills DO  07/27/21 7603

## 2021-07-27 NOTE — Clinical Note
Flavia Zamarripanickotrey was seen and treated in our emergency department on 7/26/2021  No work until cleared by Family Doctor/Orthopedics        Diagnosis: cellulitis of left foot    Monica Katz  may return to work on return date  She may return on this date: 07/29/2021         If you have any questions or concerns, please don't hesitate to call        Mortimer Corns, RN    ______________________________           _______________          _______________  [facility]                              Date                                Time

## 2021-08-22 ENCOUNTER — HOSPITAL ENCOUNTER (EMERGENCY)
Facility: HOSPITAL | Age: 41
Discharge: HOME | End: 2021-08-22
Attending: EMERGENCY MEDICINE | Admitting: EMERGENCY MEDICINE
Payer: COMMERCIAL

## 2021-08-22 ENCOUNTER — APPOINTMENT (EMERGENCY)
Dept: RADIOLOGY | Facility: HOSPITAL | Age: 41
End: 2021-08-22
Payer: COMMERCIAL

## 2021-08-22 VITALS
OXYGEN SATURATION: 98 % | RESPIRATION RATE: 21 BRPM | HEART RATE: 93 BPM | TEMPERATURE: 98.3 F | BODY MASS INDEX: 37.73 KG/M2 | WEIGHT: 205 LBS | DIASTOLIC BLOOD PRESSURE: 90 MMHG | HEIGHT: 62 IN | SYSTOLIC BLOOD PRESSURE: 126 MMHG

## 2021-08-22 DIAGNOSIS — M79.672 LEFT FOOT PAIN: Primary | ICD-10-CM

## 2021-08-22 PROCEDURE — 73630 X-RAY EXAM OF FOOT: CPT

## 2021-08-22 PROCEDURE — 99283 EMERGENCY DEPT VISIT LOW MDM: CPT

## 2021-08-22 PROCEDURE — 99282 EMERGENCY DEPT VISIT SF MDM: CPT | Performed by: PHYSICIAN ASSISTANT

## 2021-08-27 NOTE — ED PROVIDER NOTES
History  Chief Complaint   Patient presents with    Foot Injury     foot fx  hurting for the last few days  Patient is a 52-year-old female with a past medical history significant for anemia who presents to the emergency department for evaluation of left foot pain over the past couple of days  Patient states that she has a known fracture of her left foot  Patient states that 2 days ago she banged her foot against a table and her pain has gotten worse since  Patient was given a walking boot, however left her walking boot and ataxia and has not been wearing it  She is not having any other complaints at this time  Prior to Admission Medications   Prescriptions Last Dose Informant Patient Reported? Taking?    SUMAtriptan (IMITREX) 25 mg tablet  Self Yes No   Sig: Take 2 tablets at onset of migraine and one tablet every 2 hours as needed, not more than 5 tablets in 24 hours   albuterol (2 5 mg/3 mL) 0 083 % nebulizer solution  Self Yes No   Sig: every 6 (six) hours as needed    albuterol (PROVENTIL HFA,VENTOLIN HFA) 90 mcg/act inhaler  Self No No   Sig: Inhale 2 puffs every 4 (four) hours as needed for wheezing   budesonide-formoterol (SYMBICORT) 160-4 5 mcg/act inhaler  Self Yes No   Sig: Inhale 2 puffs   cetirizine (ZYRTEC ALLERGY) 10 mg tablet  Self Yes No   Sig: Take 10 mg by mouth daily   ipratropium (ATROVENT) 0 02 % nebulizer solution  Self No No   Sig: Take 1 vial (0 5 mg total) by nebulization 4 (four) times a day   montelukast (SINGULAIR) 10 mg tablet  Self Yes No   Sig: Take 10 mg by mouth daily at bedtime   naproxen (NAPROSYN) 500 mg tablet   No No   Sig: Take 1 tablet (500 mg total) by mouth 2 (two) times a day with meals As needed for pain in foot   phentermine 37 5 MG capsule  Self Yes No   Sig: Take 37 5 mg by mouth   predniSONE 20 mg tablet  Self No No   Sig: Take 2 tablets (40 mg total) by mouth daily   tiotropium (Spiriva Respimat) 1 25 MCG/ACT AERS inhaler  Self Yes No   Sig: Inhale 2 puffs   topiramate (TOPAMAX) 50 MG tablet  Self Yes No   Sig: Take 50 mg by mouth      Facility-Administered Medications: None       Past Medical History:   Diagnosis Date    Anemia     Asthma     Meningitis     Migraines        Past Surgical History:   Procedure Laterality Date    DILATION AND CURETTAGE OF UTERUS      SURGICALLY INDUCED  BY D&C    ECTOPIC PREGNANCY SURGERY Left     SALPINGECTOMY Left     RESOLVED 2010       Family History   Problem Relation Age of Onset    Breast cancer Mother     Diabetes Mother     Hypertension Mother     Osteoporosis Mother     Hypertension Father     Asthma Brother     Breast cancer Maternal Grandmother     Leukemia Maternal Grandmother     Hypertension Maternal Grandmother     Hypertension Paternal Grandmother     Heart disease Paternal Grandmother     Alzheimer's disease Paternal Grandmother     Dementia Paternal Grandmother     No Known Problems Paternal Grandfather     Breast cancer Maternal Aunt     Hypertension Family     Leukemia Daughter      I have reviewed and agree with the history as documented  E-Cigarette/Vaping    E-Cigarette Use Never User      E-Cigarette/Vaping Substances     Social History     Tobacco Use    Smoking status: Never Smoker    Smokeless tobacco: Never Used   Vaping Use    Vaping Use: Never used   Substance Use Topics    Alcohol use: Yes     Comment: socially    Drug use: No       Review of Systems   Constitutional: Negative for chills, diaphoresis and fever  HENT: Negative for congestion, facial swelling, nosebleeds, sore throat and voice change  Eyes: Negative for pain and redness  Respiratory: Negative for cough, choking, chest tightness, shortness of breath and stridor  Cardiovascular: Negative for chest pain and palpitations  Gastrointestinal: Negative for abdominal pain, diarrhea, nausea and vomiting     Genitourinary: Negative for difficulty urinating, dysuria, flank pain, hematuria, vaginal bleeding and vaginal discharge  Musculoskeletal: Positive for arthralgias (Left foot)  Negative for back pain, myalgias, neck pain and neck stiffness  Skin: Negative for color change and rash  Neurological: Negative for dizziness, syncope, facial asymmetry, weakness, light-headedness, numbness and headaches  Psychiatric/Behavioral: Negative for confusion and suicidal ideas  All other systems reviewed and are negative  Physical Exam  Physical Exam  Vitals reviewed  Constitutional:       General: She is not in acute distress  Appearance: Normal appearance  She is not ill-appearing, toxic-appearing or diaphoretic  HENT:      Head: Normocephalic and atraumatic  Right Ear: External ear normal       Left Ear: External ear normal       Nose: Nose normal  No congestion or rhinorrhea  Mouth/Throat:      Mouth: Mucous membranes are moist       Pharynx: Oropharynx is clear  No oropharyngeal exudate or posterior oropharyngeal erythema  Eyes:      General: No scleral icterus  Right eye: No discharge  Left eye: No discharge  Extraocular Movements: Extraocular movements intact  Conjunctiva/sclera: Conjunctivae normal       Pupils: Pupils are equal, round, and reactive to light  Cardiovascular:      Rate and Rhythm: Normal rate and regular rhythm  Pulses: Normal pulses  Heart sounds: Normal heart sounds  No murmur heard  No friction rub  No gallop  Pulmonary:      Effort: Pulmonary effort is normal  No respiratory distress  Breath sounds: Normal breath sounds  No stridor  No wheezing, rhonchi or rales  Abdominal:      General: Abdomen is flat  Palpations: Abdomen is soft  Tenderness: There is no abdominal tenderness  There is no guarding or rebound  Musculoskeletal:      Cervical back: Normal range of motion and neck supple  Right lower leg: No edema  Left lower leg: No edema  Left foot: Normal capillary refill  Tenderness present  Normal pulse  Skin:     General: Skin is warm and dry  Capillary Refill: Capillary refill takes less than 2 seconds  Neurological:      General: No focal deficit present  Mental Status: She is alert and oriented to person, place, and time  Psychiatric:         Mood and Affect: Mood normal          Behavior: Behavior normal          Vital Signs  ED Triage Vitals [08/22/21 0250]   Temperature Pulse Respirations Blood Pressure SpO2   98 3 °F (36 8 °C) 93 21 126/90 96 %      Temp Source Heart Rate Source Patient Position - Orthostatic VS BP Location FiO2 (%)   Oral Monitor Sitting Right arm --      Pain Score       8           Vitals:    08/22/21 0250 08/22/21 0300   BP: 126/90 126/90   Pulse: 93    Patient Position - Orthostatic VS: Sitting          Visual Acuity      ED Medications  Medications - No data to display    Diagnostic Studies  Results Reviewed     None                 XR foot 3+ views LEFT   Final Result by aMjo Euceda MD (08/22 0485)      Healing nondisplaced fracture proximal aspect of 4th metatarsal             Workstation performed: [id number]                    Procedures  Procedures         ED Course                                           MDM  Number of Diagnoses or Management Options  Left foot pain  Diagnosis management comments: Patient is a 32-year-old female with a past medical history significant for anemia who presents to the emergency department for evaluation of left foot pain over the past couple of days  Patient states that she has a known fracture of her left foot  Patient states that 2 days ago she banged her foot against a table and her pain has gotten worse since  Patient was given a walking boot, however left her walking boot and ataxia and has not been wearing it  She is not having any other complaints at this time  Patient appears comfortable in bed  She is not any acute distress    Her vital signs are normal   Physical exam remarkable for tenderness to the lateral metatarsal bones of her left foot  DP pulses 2+ bilaterally  Capillary refill is normal   X-ray of the left foot did reveal a healing fracture of the proximal 4th metatarsal bone  Patient was given an Aircast boot and instructions to follow-up with orthopedics  Patient does have an appointment with Orthopedics  Patient was given very strict instructions on when to return to the emergency department  Patient stable at this time  Amount and/or Complexity of Data Reviewed  Tests in the radiology section of CPT®: ordered and reviewed    Patient Progress  Patient progress: stable      Disposition  Final diagnoses:   Left foot pain     Time reflects when diagnosis was documented in both MDM as applicable and the Disposition within this note     Time User Action Codes Description Comment    8/22/2021  3:40 AM Jeny Haley [V97 [id number]] Left foot pain       ED Disposition     ED Disposition Condition Date/Time Comment    Discharge Stable Sun Aug 22, 2021  3:40 AM Blaine Livingston discharge to home/self care              Follow-up Information     Follow up With Specialties Details Why Contact Info Additional [facility] Emergency Department Emergency Medicine Go to  If symptoms worsen [address] [place] [id number] [facility] Emergency Department, [address] [place], [place], [address] [place] [address] Specialists [facility] Orthopedic Surgery Schedule an appointment as soon as possible for a visit  for follow up [facility] [address] [phone] 521 [facility], [address] [address] [place], [place], [phone]          Discharge Medication List as of 8/22/2021  3:40 AM      CONTINUE these medications which have NOT CHANGED    Details   albuterol (2 5 mg/3 mL) 0 083 % nebulizer solution every 6 (six) hours as needed , Starting Tue 7/18/2017, Historical Med      albuterol (PROVENTIL HFA,VENTOLIN HFA) 90 mcg/act inhaler Inhale 2 puffs every 4 (four) hours as needed for wheezing, Starting Fri 6/8/2018, Print      budesonide-formoterol (SYMBICORT) 160-4 5 mcg/act inhaler Inhale 2 puffs, Starting Thu 10/22/2020, Historical Med      cetirizine (ZYRTEC ALLERGY) 10 mg tablet Take 10 mg by mouth daily, Historical Med      ipratropium (ATROVENT) 0 02 % nebulizer solution Take 1 vial (0 5 mg total) by nebulization 4 (four) times a day, Starting Sat 1/12/2019, Normal      montelukast (SINGULAIR) 10 mg tablet Take 10 mg by mouth daily at bedtime, Historical Med      naproxen (NAPROSYN) 500 mg tablet Take 1 tablet (500 mg total) by mouth 2 (two) times a day with meals As needed for pain in foot, Starting Tue 7/27/2021, Normal      phentermine 37 5 MG capsule Take 37 5 mg by mouth, Starting Thu 10/22/2020, Historical Med      predniSONE 20 mg tablet Take 2 tablets (40 mg total) by mouth daily, Starting Sat 1/12/2019, Normal      SUMAtriptan (IMITREX) 25 mg tablet Take 2 tablets at onset of migraine and one tablet every 2 hours as needed, not more than 5 tablets in 24 hours, Historical Med      tiotropium (Spiriva Respimat) 1 25 MCG/ACT AERS inhaler Inhale 2 puffs, Starting Thu 10/22/2020, Historical Med      topiramate (TOPAMAX) 50 MG tablet Take 50 mg by mouth, Starting Thu 10/22/2020, Historical Med           No discharge procedures on file      PDMP Review     None          ED Provider  Electronically Signed by           Gilmer Barakat PA-C  08/27/21 9551

## 2021-10-16 PROCEDURE — 99283 EMERGENCY DEPT VISIT LOW MDM: CPT

## 2021-10-17 ENCOUNTER — HOSPITAL ENCOUNTER (EMERGENCY)
Facility: HOSPITAL | Age: 41
Discharge: HOME | End: 2021-10-17
Attending: EMERGENCY MEDICINE
Payer: COMMERCIAL

## 2021-10-17 ENCOUNTER — APPOINTMENT (EMERGENCY)
Dept: RADIOLOGY | Facility: HOSPITAL | Age: 41
End: 2021-10-17
Payer: COMMERCIAL

## 2021-10-17 VITALS
TEMPERATURE: 97.1 F | HEART RATE: 96 BPM | RESPIRATION RATE: 16 BRPM | SYSTOLIC BLOOD PRESSURE: 142 MMHG | BODY MASS INDEX: 37.49 KG/M2 | DIASTOLIC BLOOD PRESSURE: 86 MMHG | WEIGHT: 205 LBS | OXYGEN SATURATION: 98 %

## 2021-10-17 DIAGNOSIS — S49.91XA ARM INJURY, RIGHT, INITIAL ENCOUNTER: Primary | ICD-10-CM
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
## 2021-10-17 PROCEDURE — 73080 X-RAY EXAM OF ELBOW: CPT

## 2021-10-17 PROCEDURE — 73090 X-RAY EXAM OF FOREARM: CPT

## 2021-10-17 PROCEDURE — 73110 X-RAY EXAM OF WRIST: CPT

## 2021-10-17 PROCEDURE — 99284 EMERGENCY DEPT VISIT MOD MDM: CPT | Performed by: PHYSICIAN ASSISTANT

## 2021-10-17 RX ORDER — IBUPROFEN 400 MG/1
800 TABLET ORAL ONCE
Status: COMPLETED | OUTPATIENT
Start: 2021-10-17 | End: 2021-10-17

## 2021-10-17 RX ADMIN — IBUPROFEN 800 MG: 400 TABLET, FILM COATED ORAL at 04:18

## 2022-07-10 ENCOUNTER — APPOINTMENT (EMERGENCY)
Dept: RADIOLOGY | Facility: HOSPITAL | Age: 42
End: 2022-07-10
Payer: COMMERCIAL

## 2022-07-10 ENCOUNTER — HOSPITAL ENCOUNTER (EMERGENCY)
Facility: HOSPITAL | Age: 42
Discharge: HOME | End: 2022-07-10
Attending: EMERGENCY MEDICINE
Payer: COMMERCIAL

## 2022-07-10 VITALS
TEMPERATURE: 97 F | RESPIRATION RATE: 20 BRPM | OXYGEN SATURATION: 98 % | SYSTOLIC BLOOD PRESSURE: 128 MMHG | HEART RATE: 103 BPM | DIASTOLIC BLOOD PRESSURE: 84 MMHG

## 2022-07-10 DIAGNOSIS — M54.6 ACUTE LEFT-SIDED THORACIC BACK PAIN: Primary | ICD-10-CM

## 2022-07-10 LAB
ALBUMIN SERPL BCP-MCNC: 3.8 G/DL (ref 3.5–5)
ALP SERPL-CCNC: 35 U/L (ref 46–116)
ALT SERPL W P-5'-P-CCNC: 24 U/L (ref 12–78)
ANION GAP SERPL CALCULATED.3IONS-SCNC: 9 MMOL/L (ref 4–13)
AST SERPL W P-5'-P-CCNC: 17 U/L (ref 5–45)
ATRIAL RATE: 94 BPM
BASOPHILS # BLD AUTO: 0.05 THOUSANDS/ΜL (ref 0–0.1)
BASOPHILS NFR BLD AUTO: 1 % (ref 0–1)
BILIRUB SERPL-MCNC: 0.26 MG/DL (ref 0.2–1)
BUN SERPL-MCNC: 10 MG/DL (ref 5–25)
CALCIUM SERPL-MCNC: 9 MG/DL (ref 8.3–10.1)
CARDIAC TROPONIN I PNL SERPL HS: <2 NG/L
CHLORIDE SERPL-SCNC: 102 MMOL/L (ref 100–108)
CO2 SERPL-SCNC: 26 MMOL/L (ref 21–32)
CREAT SERPL-MCNC: 1.08 MG/DL (ref 0.6–1.3)
EOSINOPHIL # BLD AUTO: 0.02 THOUSAND/ΜL (ref 0–0.61)
EOSINOPHIL NFR BLD AUTO: 0 % (ref 0–6)
ERYTHROCYTE [DISTWIDTH] IN BLOOD BY AUTOMATED COUNT: 12.8 % (ref 11.6–15.1)
GFR SERPL CREATININE-BSD FRML MDRD: 64 ML/MIN/1.73SQ M
GLUCOSE SERPL-MCNC: 134 MG/DL (ref 65–140)
HCT VFR BLD AUTO: 40.9 % (ref 34.8–46.1)
HGB BLD-MCNC: 13.3 G/DL (ref 11.5–15.4)
IMM GRANULOCYTES # BLD AUTO: 0.05 THOUSAND/UL (ref 0–0.2)
IMM GRANULOCYTES NFR BLD AUTO: 1 % (ref 0–2)
LYMPHOCYTES # BLD AUTO: 1.88 THOUSANDS/ΜL (ref 0.6–4.47)
LYMPHOCYTES NFR BLD AUTO: 18 % (ref 14–44)
MCH RBC QN AUTO: 29.4 PG (ref 26.8–34.3)
MCHC RBC AUTO-ENTMCNC: 32.5 G/DL (ref 31.4–37.4)
MCV RBC AUTO: 91 FL (ref 82–98)
MONOCYTES # BLD AUTO: 0.38 THOUSAND/ΜL (ref 0.17–1.22)
MONOCYTES NFR BLD AUTO: 4 % (ref 4–12)
NEUTROPHILS # BLD AUTO: 8.1 THOUSANDS/ΜL (ref 1.85–7.62)
NEUTS SEG NFR BLD AUTO: 76 % (ref 43–75)
NRBC BLD AUTO-RTO: 0 /100 WBCS
P AXIS: 80 DEGREES
PLATELET # BLD AUTO: 267 THOUSANDS/UL (ref 149–390)
PMV BLD AUTO: 10.8 FL (ref 8.9–12.7)
POTASSIUM SERPL-SCNC: 4.5 MMOL/L (ref 3.5–5.3)
PR INTERVAL: 134 MS
PROT SERPL-MCNC: 7.6 G/DL (ref 6.4–8.2)
QRS AXIS: 98 DEGREES
QRSD INTERVAL: 90 MS
QT INTERVAL: 388 MS
QTC INTERVAL: 485 MS
RBC # BLD AUTO: 4.52 MILLION/UL (ref 3.81–5.12)
SODIUM SERPL-SCNC: 137 MMOL/L (ref 136–145)
T WAVE AXIS: 77 DEGREES
VENTRICULAR RATE: 94 BPM
WBC # BLD AUTO: 10.48 THOUSAND/UL (ref 4.31–10.16)

## 2022-07-10 PROCEDURE — 71046 X-RAY EXAM CHEST 2 VIEWS: CPT

## 2022-07-10 PROCEDURE — 99285 EMERGENCY DEPT VISIT HI MDM: CPT

## 2022-07-10 PROCEDURE — 36415 COLL VENOUS BLD VENIPUNCTURE: CPT

## 2022-07-10 PROCEDURE — 85025 COMPLETE CBC W/AUTO DIFF WBC: CPT | Performed by: EMERGENCY MEDICINE

## 2022-07-10 PROCEDURE — 93005 ELECTROCARDIOGRAM TRACING: CPT

## 2022-07-10 PROCEDURE — 80053 COMPREHEN METABOLIC PANEL: CPT | Performed by: EMERGENCY MEDICINE

## 2022-07-10 PROCEDURE — 93010 ELECTROCARDIOGRAM REPORT: CPT | Performed by: INTERNAL MEDICINE

## 2022-07-10 PROCEDURE — 99285 EMERGENCY DEPT VISIT HI MDM: CPT | Performed by: EMERGENCY MEDICINE

## 2022-07-10 PROCEDURE — 84484 ASSAY OF TROPONIN QUANT: CPT | Performed by: EMERGENCY MEDICINE

## 2022-07-10 RX ORDER — CYCLOBENZAPRINE HCL 10 MG
10 TABLET ORAL 3 TIMES DAILY PRN
Qty: 20 TABLET | Refills: 0 | Status: SHIPPED | OUTPATIENT
Start: 2022-07-10

## 2022-07-10 RX ORDER — PREDNISONE 20 MG/1
40 TABLET ORAL DAILY
Qty: 10 TABLET | Refills: 0 | Status: SHIPPED | OUTPATIENT
Start: 2022-07-10

## 2022-07-10 RX ORDER — CYCLOBENZAPRINE HCL 10 MG
10 TABLET ORAL ONCE
Status: COMPLETED | OUTPATIENT
Start: 2022-07-10 | End: 2022-07-10

## 2022-07-10 RX ADMIN — CYCLOBENZAPRINE HYDROCHLORIDE 10 MG: 10 TABLET, FILM COATED ORAL at 20:26

## 2022-07-11 NOTE — ED PROVIDER NOTES
History  Chief Complaint   Patient presents with    Shortness of Breath    Asthma    Back Pain     3 days, pt reports back pain that radiates into L arm     3 days of L upper back pain  Gradual onset  Occurs in context of increased coughing and some mild dyspnea which feels similar in character and severity to prior asthma exacerbations  States her dyspnea and associated wheezing have been alleviated since starting leftover prednisone she had at home but the pain in her back has persisted and prompted her to seek evaluation tonight  She denies hemoptysis  She denies leg pain and swelling  She denies recent surgery or immobilization  She denies h/o VTE  Pain in back moderate, pleuritic, radiates to L chest  Not exertional or positional  No associated nausea or vomiting  No syncope or near syncope  No h/o CAD  Prior to Admission Medications   Prescriptions Last Dose Informant Patient Reported? Taking?    SUMAtriptan (IMITREX) 25 mg tablet  Self Yes No   Sig: Take 2 tablets at onset of migraine and one tablet every 2 hours as needed, not more than 5 tablets in 24 hours   albuterol (2 5 mg/3 mL) 0 083 % nebulizer solution  Self Yes No   Sig: every 6 (six) hours as needed    albuterol (PROVENTIL HFA,VENTOLIN HFA) 90 mcg/act inhaler  Self No No   Sig: Inhale 2 puffs every 4 (four) hours as needed for wheezing   budesonide-formoterol (SYMBICORT) 160-4 5 mcg/act inhaler  Self Yes No   Sig: Inhale 2 puffs   cetirizine (ZYRTEC ALLERGY) 10 mg tablet  Self Yes No   Sig: Take 10 mg by mouth daily   ipratropium (ATROVENT) 0 02 % nebulizer solution  Self No No   Sig: Take 1 vial (0 5 mg total) by nebulization 4 (four) times a day   montelukast (SINGULAIR) 10 mg tablet  Self Yes No   Sig: Take 10 mg by mouth daily at bedtime   naproxen (NAPROSYN) 500 mg tablet   No No   Sig: Take 1 tablet (500 mg total) by mouth 2 (two) times a day with meals As needed for pain in foot   phentermine 37 5 MG capsule  Self Yes No   Sig: Take 37 5 mg by mouth   predniSONE 20 mg tablet  Self No No   Sig: Take 2 tablets (40 mg total) by mouth daily   tiotropium (Spiriva Respimat) 1 25 MCG/ACT AERS inhaler  Self Yes No   Sig: Inhale 2 puffs   topiramate (TOPAMAX) 50 MG tablet  Self Yes No   Sig: Take 50 mg by mouth      Facility-Administered Medications: None       Past Medical History:   Diagnosis Date    Anemia     Asthma     Meningitis     Migraines        Past Surgical History:   Procedure Laterality Date    DILATION AND CURETTAGE OF UTERUS      SURGICALLY INDUCED  BY D&C    ECTOPIC PREGNANCY SURGERY Left     SALPINGECTOMY Left     RESOLVED 2010       Family History   Problem Relation Age of Onset    Breast cancer Mother     Diabetes Mother     Hypertension Mother     Osteoporosis Mother     Hypertension Father     Asthma Brother     Breast cancer Maternal Grandmother     Leukemia Maternal Grandmother     Hypertension Maternal Grandmother     Hypertension Paternal Grandmother     Heart disease Paternal Grandmother     Alzheimer's disease Paternal Grandmother     Dementia Paternal Grandmother     No Known Problems Paternal Grandfather     Breast cancer Maternal Aunt     Hypertension Family     Leukemia Daughter      I have reviewed and agree with the history as documented  E-Cigarette/Vaping    E-Cigarette Use Never User      E-Cigarette/Vaping Substances     Social History     Tobacco Use    Smoking status: Never Smoker    Smokeless tobacco: Never Used   Vaping Use    Vaping Use: Never used   Substance Use Topics    Alcohol use: Yes     Comment: socially    Drug use: No       Review of Systems   Respiratory: Positive for chest tightness  Musculoskeletal: Positive for back pain  All other systems reviewed and are negative  Physical Exam  Physical Exam  Vitals and nursing note reviewed  Constitutional:       General: She is not in acute distress  Appearance: She is well-developed   She is not diaphoretic  HENT:      Head: Normocephalic and atraumatic  Eyes:      Conjunctiva/sclera: Conjunctivae normal       Pupils: Pupils are equal, round, and reactive to light  Neck:      Vascular: No JVD  Cardiovascular:      Rate and Rhythm: Normal rate and regular rhythm  Heart sounds: Normal heart sounds  Pulmonary:      Effort: Pulmonary effort is normal       Breath sounds: Normal breath sounds  No stridor  Abdominal:      General: There is no distension  Palpations: Abdomen is soft  Tenderness: There is no abdominal tenderness  There is no guarding or rebound  Musculoskeletal:         General: No tenderness or deformity  Normal range of motion  Cervical back: Normal range of motion and neck supple  Skin:     General: Skin is warm and dry  Capillary Refill: Capillary refill takes less than 2 seconds  Coloration: Skin is not pale  Findings: No erythema or rash  Neurological:      Mental Status: She is alert and oriented to person, place, and time  Cranial Nerves: No cranial nerve deficit  Sensory: No sensory deficit  Motor: No abnormal muscle tone        Coordination: Coordination normal          Vital Signs  ED Triage Vitals [07/10/22 1814]   Temperature Pulse Respirations Blood Pressure SpO2   (!) 97 °F (36 1 °C) 103 20 128/84 98 %      Temp Source Heart Rate Source Patient Position - Orthostatic VS BP Location FiO2 (%)   Tympanic Monitor Sitting Left arm --      Pain Score       --           Vitals:    07/10/22 1814   BP: 128/84   Pulse: 103   Patient Position - Orthostatic VS: Sitting         Visual Acuity      ED Medications  Medications   cyclobenzaprine (FLEXERIL) tablet 10 mg (10 mg Oral Given 7/10/22 2026)       Diagnostic Studies  Results Reviewed     Procedure Component Value Units Date/Time    HS Troponin 0hr (reflex protocol) [[id number]]  (Normal) Collected: 07/10/22 1918    Lab Status: Final result Specimen: Blood from Arm, Right Updated: 07/10/22 1950     hs TnI 0hr <2 ng/L     Comprehensive metabolic panel [[id number]]  (Abnormal) Collected: 07/10/22 1918    Lab Status: Final result Specimen: Blood from Arm, Right Updated: 07/10/22 1941     Sodium 137 mmol/L      Potassium 4 5 mmol/L      Chloride 102 mmol/L      CO2 26 mmol/L      ANION GAP 9 mmol/L      BUN 10 mg/dL      Creatinine 1 08 mg/dL      Glucose 134 mg/dL      Calcium 9 0 mg/dL      AST 17 U/L      ALT 24 U/L      Alkaline Phosphatase 35 U/L      Total Protein 7 6 g/dL      Albumin 3 8 g/dL      Total Bilirubin 0 26 mg/dL      eGFR 64 ml/min/1 73sq m     Narrative:      National Kidney Disease Foundation guidelines for Chronic Kidney Disease (CKD):     Stage 1 with normal or high GFR (GFR > 90 mL/min/1 73 square meters)    Stage 2 Mild CKD (GFR = 60-89 mL/min/1 73 square meters)    Stage 3A Moderate CKD (GFR = 45-59 mL/min/1 73 square meters)    Stage 3B Moderate CKD (GFR = 30-44 mL/min/1 73 square meters)    Stage 4 Severe CKD (GFR = 15-29 mL/min/1 73 square meters)    Stage 5 End Stage CKD (GFR <15 mL/min/1 73 square meters)  Note: GFR calculation is accurate only with a steady state creatinine    CBC and differential [[id number]]  (Abnormal) Collected: 07/10/22 1918    Lab Status: Final result Specimen: Blood from Arm, Right Updated: 07/10/22 1925     WBC 10 48 Thousand/uL      RBC 4 52 Million/uL      Hemoglobin 13 3 g/dL      Hematocrit 40 9 %      MCV 91 fL      MCH 29 4 pg      MCHC 32 5 g/dL      RDW 12 8 %      MPV 10 8 fL      Platelets 299 Thousands/uL      nRBC 0 /100 WBCs      Neutrophils Relative 76 %      Immat GRANS % 1 %      Lymphocytes Relative 18 %      Monocytes Relative 4 %      Eosinophils Relative 0 %      Basophils Relative 1 %      Neutrophils Absolute 8 10 Thousands/µL      Immature Grans Absolute 0 05 Thousand/uL      Lymphocytes Absolute 1 88 Thousands/µL      Monocytes Absolute 0 38 Thousand/µL      Eosinophils Absolute 0 02 Thousand/µL Basophils Absolute 0 05 Thousands/µL                  XR chest 2 views   ED Interpretation by Jayla Delgadillo MD (07/10 2006)   No acute abnormality  Procedures  ECG 12 Lead Documentation Only    Date/Time: 7/10/2022 10:34 PM  Performed by: Jayla Delgadillo MD  Authorized by: Jayla Delgadillo MD     Indications / Diagnosis:  Cp  ECG reviewed by me, the ED Provider: yes    Patient location:  ED  Previous ECG:     Previous ECG:  Unavailable  Interpretation:     Interpretation: normal    Rate:     ECG rate:  94    ECG rate assessment: normal    Rhythm:     Rhythm: sinus rhythm    Comments:      Normal ekg  No beto or std                ED Course             HEART Risk Score    Flowsheet Row Most Recent Value   Heart Score Risk Calculator    History 0 Filed at: 07/10/2022 2030   ECG 0 Filed at: 07/10/2022 2030   Age 0 Filed at: 07/10/2022 2030   Risk Factors 1 Filed at: 07/10/2022 2030   Troponin 0 Filed at: 07/10/2022 2030   HEART Score 1 Filed at: 07/10/2022 2030                            Jefejose Sheehan' Criteria for PE    Flowsheet Row Most Recent Value   Wells' Criteria for PE    Clinical signs and symptoms of DVT 0 Filed at: 07/10/2022 2007   PE is primary diagnosis or equally likely 0 Filed at: 07/10/2022 2007   HR >100 1 5 Filed at: 07/10/2022 2007   Immobilization at least 3 days or Surgery in the previous 4 weeks 0 Filed at: 07/10/2022 2007   Previous, objectively diagnosed PE or DVT 0 Filed at: 07/10/2022 2007   Hemoptysis 0 Filed at: 07/10/2022 2007   Malignancy with treatment within 6 months or palliative 0 Filed at: 07/10/2022 2007   Wells' Criteria Total 1 5 Filed at: 07/10/2022 2007                [facility]  Number of Diagnoses or Management Options     Amount and/or Complexity of Data Reviewed  Tests in the radiology section of CPT®: ordered and reviewed        Disposition  Final diagnoses:   Acute left-sided thoracic back pain     Time reflects when diagnosis was documented in both MDM as applicable and the Disposition within this note     Time User Action Codes Description Comment    7/10/2022  8:14 PM Jerardo Marisol Tera [M54 6] Acute left-sided thoracic back pain       ED Disposition     ED Disposition   Discharge    Condition   Stable    Date/Time   Sun Jul 10, 2022  8:13 PM    Comment   Jose M Duarte Miki discharge to home/self care  Follow-up Information     Follow up With Specialties Details Why Contact Info Additional Information    [facility]  If symptoms worsen [address] Emergency Department, [address] [place] [address], [facility]          Discharge Medication List as of 7/10/2022  8:15 PM      START taking these medications    Details   cyclobenzaprine (FLEXERIL) 10 mg tablet Take 1 tablet (10 mg total) by mouth 3 (three) times a day as needed for muscle spasms, Starting Sun 7/10/2022, Print      !! predniSONE 20 mg tablet Take 2 tablets (40 mg total) by mouth daily, Starting Sun 7/10/2022, Print       !! - Potential duplicate medications found  Please discuss with provider        CONTINUE these medications which have NOT CHANGED    Details   albuterol (2 5 mg/3 mL) 0 083 % nebulizer solution every 6 (six) hours as needed , Starting Tue 7/18/2017, Historical Med      albuterol (PROVENTIL HFA,VENTOLIN HFA) 90 mcg/act inhaler Inhale 2 puffs every 4 (four) hours as needed for wheezing, Starting Fri 6/8/2018, Print      budesonide-formoterol (SYMBICORT) 160-4 5 mcg/act inhaler Inhale 2 puffs, Starting u 10/22/2020, Historical Med      cetirizine (ZYRTEC ALLERGY) 10 mg tablet Take 10 mg by mouth daily, Historical Med      ipratropium (ATROVENT) 0 02 % nebulizer solution Take 1 vial (0 5 mg total) by nebulization 4 (four) times a day, Starting Sat 1/12/2019, Normal      montelukast (SINGULAIR) 10 mg tablet Take 10 mg by mouth daily at bedtime, Historical Med      naproxen (NAPROSYN) 500 mg tablet Take 1 tablet (500 mg total) by mouth 2 (two) times a day with meals As needed for pain in foot, Starting Tue 7/27/2021, Normal      phentermine 37 5 MG capsule Take 37 5 mg by mouth, Starting Thu 10/22/2020, Historical Med      !! predniSONE 20 mg tablet Take 2 tablets (40 mg total) by mouth daily, Starting Sat 1/12/2019, Normal      SUMAtriptan (IMITREX) 25 mg tablet Take 2 tablets at onset of migraine and one tablet every 2 hours as needed, not more than 5 tablets in 24 hours, Historical Med      tiotropium (Spiriva Respimat) 1 25 MCG/ACT AERS inhaler Inhale 2 puffs, Starting Thu 10/22/2020, Historical Med      topiramate (TOPAMAX) 50 MG tablet Take 50 mg by mouth, Starting Thu 10/22/2020, Historical Med       !! - Potential duplicate medications found  Please discuss with provider  No discharge procedures on file      PDMP Review     None          ED Provider  Electronically Signed by           Bhavana Maria MD  07/10/22 2942

## 2022-11-30 ENCOUNTER — HOSPITAL ENCOUNTER (EMERGENCY)
Facility: HOSPITAL | Age: 42
Discharge: HOME | End: 2022-11-30
Attending: EMERGENCY MEDICINE

## 2022-11-30 ENCOUNTER — APPOINTMENT (EMERGENCY)
Dept: RADIOLOGY | Facility: HOSPITAL | Age: 42
End: 2022-11-30

## 2022-11-30 VITALS
RESPIRATION RATE: 20 BRPM | HEART RATE: 124 BPM | TEMPERATURE: 97.8 F | OXYGEN SATURATION: 100 % | DIASTOLIC BLOOD PRESSURE: 97 MMHG | SYSTOLIC BLOOD PRESSURE: 153 MMHG

## 2022-11-30 DIAGNOSIS — J45.901 ASTHMA EXACERBATION: Primary | ICD-10-CM

## 2022-11-30 LAB
ALBUMIN SERPL BCP-MCNC: 3.7 G/DL (ref 3.5–5)
ALP SERPL-CCNC: 35 U/L (ref 46–116)
ALT SERPL W P-5'-P-CCNC: 21 U/L (ref 12–78)
ANION GAP SERPL CALCULATED.3IONS-SCNC: 9 MMOL/L (ref 4–13)
AST SERPL W P-5'-P-CCNC: 16 U/L (ref 5–45)
BASOPHILS # BLD AUTO: 0.1 THOUSANDS/ÂΜL (ref 0–0.1)
BASOPHILS NFR BLD AUTO: 1 % (ref 0–1)
BILIRUB SERPL-MCNC: 0.54 MG/DL (ref 0.2–1)
BUN SERPL-MCNC: 11 MG/DL (ref 5–25)
CALCIUM SERPL-MCNC: 8.7 MG/DL (ref 8.3–10.1)
CARDIAC TROPONIN I PNL SERPL HS: <2 NG/L
CHLORIDE SERPL-SCNC: 106 MMOL/L (ref 96–108)
CO2 SERPL-SCNC: 26 MMOL/L (ref 21–32)
CREAT SERPL-MCNC: 0.98 MG/DL (ref 0.6–1.3)
EOSINOPHIL # BLD AUTO: 1.53 THOUSAND/ÂΜL (ref 0–0.61)
EOSINOPHIL NFR BLD AUTO: 16 % (ref 0–6)
ERYTHROCYTE [DISTWIDTH] IN BLOOD BY AUTOMATED COUNT: 13.7 % (ref 11.6–15.1)
GFR SERPL CREATININE-BSD FRML MDRD: 71 ML/MIN/1.73SQ M
GLUCOSE SERPL-MCNC: 98 MG/DL (ref 65–140)
HCT VFR BLD AUTO: 39.2 % (ref 34.8–46.1)
HGB BLD-MCNC: 12.5 G/DL (ref 11.5–15.4)
IMM GRANULOCYTES # BLD AUTO: 0.02 THOUSAND/UL (ref 0–0.2)
IMM GRANULOCYTES NFR BLD AUTO: 0 % (ref 0–2)
LYMPHOCYTES # BLD AUTO: 2.64 THOUSANDS/ÂΜL (ref 0.6–4.47)
LYMPHOCYTES NFR BLD AUTO: 28 % (ref 14–44)
MCH RBC QN AUTO: 28.5 PG (ref 26.8–34.3)
MCHC RBC AUTO-ENTMCNC: 31.9 G/DL (ref 31.4–37.4)
MCV RBC AUTO: 90 FL (ref 82–98)
MONOCYTES # BLD AUTO: 0.48 THOUSAND/ÂΜL (ref 0.17–1.22)
MONOCYTES NFR BLD AUTO: 5 % (ref 4–12)
NEUTROPHILS # BLD AUTO: 4.61 THOUSANDS/ÂΜL (ref 1.85–7.62)
NEUTS SEG NFR BLD AUTO: 50 % (ref 43–75)
NRBC BLD AUTO-RTO: 0 /100 WBCS
PLATELET # BLD AUTO: 243 THOUSANDS/UL (ref 149–390)
PMV BLD AUTO: 10.4 FL (ref 8.9–12.7)
POTASSIUM SERPL-SCNC: 3.9 MMOL/L (ref 3.5–5.3)
PROT SERPL-MCNC: 7.4 G/DL (ref 6.4–8.4)
RBC # BLD AUTO: 4.38 MILLION/UL (ref 3.81–5.12)
SODIUM SERPL-SCNC: 141 MMOL/L (ref 135–147)
WBC # BLD AUTO: 9.38 THOUSAND/UL (ref 4.31–10.16)

## 2022-11-30 RX ORDER — ALBUTEROL SULFATE 90 UG/1
2 AEROSOL, METERED RESPIRATORY (INHALATION) ONCE
Status: COMPLETED | OUTPATIENT
Start: 2022-11-30 | End: 2022-11-30

## 2022-11-30 RX ORDER — PREDNISONE 20 MG/1
60 TABLET ORAL DAILY
Qty: 15 TABLET | Refills: 0 | Status: SHIPPED | OUTPATIENT
Start: 2022-11-30 | End: 2022-12-05

## 2022-11-30 RX ORDER — ALBUTEROL SULFATE 90 UG/1
2 AEROSOL, METERED RESPIRATORY (INHALATION) EVERY 6 HOURS PRN
Qty: 8.5 G | Refills: 0 | Status: SHIPPED | OUTPATIENT
Start: 2022-11-30

## 2022-11-30 RX ORDER — PREDNISONE 20 MG/1
60 TABLET ORAL ONCE
Status: COMPLETED | OUTPATIENT
Start: 2022-11-30 | End: 2022-11-30

## 2022-11-30 RX ADMIN — ALBUTEROL SULFATE 2 PUFF: 90 AEROSOL, METERED RESPIRATORY (INHALATION) at 12:11

## 2022-11-30 RX ADMIN — PREDNISONE 60 MG: 20 TABLET ORAL at 12:11

## 2022-11-30 NOTE — ED PROVIDER NOTES
History  Chief Complaint   Patient presents with   • Asthma     Pt reports she had an asthma attack yesterday, has a history of asthma  Reports she has not felt right since her asthma attack , repots she has chest and back discomfort     40 y o   female  Patient presents with:  Asthma: Pt reports she had an asthma attack yesterday, has a history of asthma  Reports she has not felt right since her asthma attack , repots she has chest and back discomfort    Past Medical History:  No date: Anemia  No date: Asthma  No date: Meningitis  No date: Migraines Active Ambulatory Problems    Asthma         Date Noted: 2018      Migraine         Date Noted: 2017      PCOS (polycystic ovarian syndrome)         Date Noted: 2018      Encounter for supervision of normal pregnancy in multigravida in second trimester         Date Noted: 2018      History of pre-eclampsia in prior pregnancy, currently pregnant in second trimester         Date Noted: 2018      History of  delivery, currently pregnant in second trimester         Date Noted: 2018      Advanced maternal age in multigravida, second trimester         Date Noted: 2018      History of ectopic pregnancy         Date Noted: 2018      Lumbar disc herniation         Date Noted: 2020      Facet arthropathy, lumbar         Date Noted: 2020      Degenerative disc disease, lumbar         Date Noted: 2020      Foraminal stenosis of lumbar region         Date Noted: 2020      Lumbar radiculopathy         Date Noted: 2020      Chronic pain syndrome         Date Noted: 2020      Contusion of toe         Date Noted: 2021    Resolved Ambulatory Problems  No Resolved Ambulatory Problems  Past Medical History:  No date: Anemia  No date: Meningitis  No date: Migraines         40year old female pt with history of well controlled asthma come to the ED with cc of asthma exacerbation    She started with chest pain radiating to her back  History provided by:  Patient   used: No    Medical Problem  Severity:  Mild  Onset quality:  Gradual  Timing:  Constant  Progression:  Worsening  Chronicity:  New  Associated symptoms: no chest pain, no cough, no rhinorrhea and no sore throat        Prior to Admission Medications   Prescriptions Last Dose Informant Patient Reported? Taking?    SUMAtriptan (IMITREX) 25 mg tablet   Yes No   Sig: Take 2 tablets at onset of migraine and one tablet every 2 hours as needed, not more than 5 tablets in 24 hours   albuterol (2 5 mg/3 mL) 0 083 % nebulizer solution   Yes No   Sig: every 6 (six) hours as needed    albuterol (PROVENTIL HFA,VENTOLIN HFA) 90 mcg/act inhaler   No No   Sig: Inhale 2 puffs every 4 (four) hours as needed for wheezing   budesonide-formoterol (SYMBICORT) 160-4 5 mcg/act inhaler   Yes No   Sig: Inhale 2 puffs   cetirizine (ZYRTEC ALLERGY) 10 mg tablet   Yes No   Sig: Take 10 mg by mouth daily   cyclobenzaprine (FLEXERIL) 10 mg tablet   No No   Sig: Take 1 tablet (10 mg total) by mouth 3 (three) times a day as needed for muscle spasms   ipratropium (ATROVENT) 0 02 % nebulizer solution   No No   Sig: Take 1 vial (0 5 mg total) by nebulization 4 (four) times a day   montelukast (SINGULAIR) 10 mg tablet   Yes No   Sig: Take 10 mg by mouth daily at bedtime   naproxen (NAPROSYN) 500 mg tablet   No No   Sig: Take 1 tablet (500 mg total) by mouth 2 (two) times a day with meals As needed for pain in foot   phentermine 37 5 MG capsule   Yes No   Sig: Take 37 5 mg by mouth   predniSONE 20 mg tablet   No No   Sig: Take 2 tablets (40 mg total) by mouth daily   predniSONE 20 mg tablet   No No   Sig: Take 2 tablets (40 mg total) by mouth daily   tiotropium (Spiriva Respimat) 1 25 MCG/ACT AERS inhaler   Yes No   Sig: Inhale 2 puffs   topiramate (TOPAMAX) 50 MG tablet   Yes No   Sig: Take 50 mg by mouth      Facility-Administered Medications: None Past Medical History:   Diagnosis Date   • Anemia    • Asthma    • Meningitis    • Migraines        Past Surgical History:   Procedure Laterality Date   • DILATION AND CURETTAGE OF UTERUS      SURGICALLY INDUCED  BY D&C   • ECTOPIC PREGNANCY SURGERY Left    • SALPINGECTOMY Left     RESOLVED 2010       Family History   Problem Relation Age of Onset   • Breast cancer Mother    • Diabetes Mother    • Hypertension Mother    • Osteoporosis Mother    • Hypertension Father    • Asthma Brother    • Breast cancer Maternal Grandmother    • Leukemia Maternal Grandmother    • Hypertension Maternal Grandmother    • Hypertension Paternal Grandmother    • Heart disease Paternal Grandmother    • Alzheimer's disease Paternal Grandmother    • Dementia Paternal Grandmother    • No Known Problems Paternal Grandfather    • Breast cancer Maternal Aunt    • Hypertension Family    • Leukemia Daughter      I have reviewed and agree with the history as documented  E-Cigarette/Vaping   • E-Cigarette Use Never User      E-Cigarette/Vaping Substances     Social History     Tobacco Use   • Smoking status: Never   • Smokeless tobacco: Never   Vaping Use   • Vaping Use: Never used   Substance Use Topics   • Alcohol use: Yes     Comment: socially   • Drug use: No       Review of Systems   HENT: Negative for rhinorrhea and sore throat  Respiratory: Negative for cough  Cardiovascular: Negative for chest pain  All other systems reviewed and are negative  Physical Exam  Physical Exam  Vitals and nursing note reviewed  Constitutional:       Appearance: She is well-developed and well-nourished  HENT:      Head: Normocephalic and atraumatic  Right Ear: External ear normal       Left Ear: External ear normal    Eyes:      Extraocular Movements: EOM normal       Conjunctiva/sclera: Conjunctivae normal    Neck:      Thyroid: No thyromegaly  Vascular: No JVD  Trachea: No tracheal deviation     Cardiovascular: Rate and Rhythm: Normal rate  Pulmonary:      Effort: Pulmonary effort is normal       Breath sounds: Normal breath sounds  No stridor  Abdominal:      General: There is no distension  Palpations: Abdomen is soft  There is no mass  Tenderness: There is no abdominal tenderness  There is no guarding  Hernia: No hernia is present  Musculoskeletal:         General: No tenderness, deformity or edema  Normal range of motion  Lymphadenopathy:      Cervical: No cervical adenopathy  Skin:     General: Skin is warm  Coloration: Skin is not pale  Findings: No erythema or rash  Neurological:      Mental Status: She is alert and oriented to person, place, and time     Psychiatric:         Mood and Affect: Mood and affect normal          Behavior: Behavior normal          Vital Signs  ED Triage Vitals   Temperature Pulse Respirations Blood Pressure SpO2   11/30/22 1049 11/30/22 1049 11/30/22 1049 11/30/22 1050 11/30/22 1049   97 8 °F (36 6 °C) (!) 124 20 153/97 100 %      Temp src Heart Rate Source Patient Position - Orthostatic VS BP Location FiO2 (%)   -- -- -- -- --             Pain Score       11/30/22 1049       8           Vitals:    11/30/22 1049 11/30/22 1050   BP:  153/97   Pulse: (!) 124          Visual Acuity      ED Medications  Medications   albuterol (PROVENTIL HFA,VENTOLIN HFA) inhaler 2 puff (2 puffs Inhalation Given 11/30/22 1211)   predniSONE tablet 60 mg (60 mg Oral Given 11/30/22 1211)       Diagnostic Studies  Results Reviewed     Procedure Component Value Units Date/Time    HS Troponin 0hr (reflex protocol) [[id number]]  (Normal) Collected: 11/30/22 1206    Lab Status: Final result Specimen: Blood from Arm, Left Updated: 11/30/22 1243     hs TnI 0hr <2 ng/L     Comprehensive metabolic panel [[id number]]  (Abnormal) Collected: 11/30/22 1206    Lab Status: Final result Specimen: Blood from Arm, Left Updated: 11/30/22 1241     Sodium 141 mmol/L      Potassium 3 9 mmol/L Chloride 106 mmol/L      CO2 26 mmol/L      ANION GAP 9 mmol/L      BUN 11 mg/dL      Creatinine 0 98 mg/dL      Glucose 98 mg/dL      Calcium 8 7 mg/dL      AST 16 U/L      ALT 21 U/L      Alkaline Phosphatase 35 U/L      Total Protein 7 4 g/dL      Albumin 3 7 g/dL      Total Bilirubin 0 54 mg/dL      eGFR 71 ml/min/1 73sq m     Narrative:      [employer] guidelines for Chronic Kidney Disease (CKD):   •  Stage 1 with normal or high GFR (GFR > 90 mL/min/1 73 square meters)  •  Stage 2 Mild CKD (GFR = 60-89 mL/min/1 73 square meters)  •  Stage 3A Moderate CKD (GFR = 45-59 mL/min/1 73 square meters)  •  Stage 3B Moderate CKD (GFR = 30-44 mL/min/1 73 square meters)  •  Stage 4 Severe CKD (GFR = 15-29 mL/min/1 73 square meters)  •  Stage 5 End Stage CKD (GFR <15 mL/min/1 73 square meters)  Note: GFR calculation is accurate only with a steady state creatinine    CBC and differential [[id number]]  (Abnormal) Collected: 11/30/22 1206    Lab Status: Final result Specimen: Blood from Arm, Left Updated: 11/30/22 1213     WBC 9 38 Thousand/uL      RBC 4 38 Million/uL      Hemoglobin 12 5 g/dL      Hematocrit 39 2 %      MCV 90 fL      MCH 28 5 pg      MCHC 31 9 g/dL      RDW 13 7 %      MPV 10 4 fL      Platelets 925 Thousands/uL      nRBC 0 /100 WBCs      Neutrophils Relative 50 %      Immat GRANS % 0 %      Lymphocytes Relative 28 %      Monocytes Relative 5 %      Eosinophils Relative 16 %      Basophils Relative 1 %      Neutrophils Absolute 4 61 Thousands/µL      Immature Grans Absolute 0 02 Thousand/uL      Lymphocytes Absolute 2 64 Thousands/µL      Monocytes Absolute 0 48 Thousand/µL      Eosinophils Absolute 1 53 Thousand/µL      Basophils Absolute 0 10 Thousands/µL                  XR chest 2 views   Final Result by Fausto Jones MD (11/30 0405)      No acute cardiopulmonary disease        Findings are stable            Workstation performed: [id number] Procedures  Procedures         ED Course  ED Course as of 12/02/22 1405   Wed Nov 30, 2022   1157 EKG done at 1154 shows a sinus rhythm, ventricular rate is 86  No ectopy  No ST Elevation or depression indicative of acute ischemia  MDM  Number of Diagnoses or Management Options  Asthma exacerbation: new and requires workup     Amount and/or Complexity of Data Reviewed  Clinical lab tests: ordered and reviewed  Independent visualization of images, tracings, or specimens: yes    Patient Progress  Patient progress: stable      Disposition  Final diagnoses:   Asthma exacerbation     Time reflects when diagnosis was documented in both MDM as applicable and the Disposition within this note     Time User Action Codes Description Comment    11/30/2022  1:40 PM Pk Haley [[id number] [id number]] Asthma exacerbation       ED Disposition     ED Disposition   Discharge    Condition   Stable    Date/Time   Wed Nov 30, 2022  1:39 PM    Comment   Karey Livingston discharge to home/self care  Follow-up Information     Follow up With Specialties Details Why Contact Info    Demetris Byrd MD Family Medicine   [address] [place] [address]  N  [phone]            Discharge Medication List as of 11/30/2022  1:43 PM      START taking these medications    Details   !! albuterol (ProAir HFA) 90 mcg/act inhaler Inhale 2 puffs every 6 (six) hours as needed for wheezing, Starting Wed 11/30/2022, Normal      !! predniSONE 20 mg tablet Take 3 tablets (60 mg total) by mouth daily for 5 days, Starting Wed 11/30/2022, Until Mon 12/5/2022, Normal       !! - Potential duplicate medications found  Please discuss with provider        CONTINUE these medications which have NOT CHANGED    Details   albuterol (2 5 mg/3 mL) 0 083 % nebulizer solution every 6 (six) hours as needed , Starting Tue 7/18/2017, Historical Med      !! albuterol (PROVENTIL HFA,VENTOLIN HFA) 90 mcg/act inhaler Inhale 2 puffs every 4 (four) hours as needed for wheezing, Starting Fri 6/8/2018, Print      budesonide-formoterol (SYMBICORT) 160-4 5 mcg/act inhaler Inhale 2 puffs, Starting Thu 10/22/2020, Historical Med      cetirizine (ZYRTEC ALLERGY) 10 mg tablet Take 10 mg by mouth daily, Historical Med      cyclobenzaprine (FLEXERIL) 10 mg tablet Take 1 tablet (10 mg total) by mouth 3 (three) times a day as needed for muscle spasms, Starting Sun 7/10/2022, Print      ipratropium (ATROVENT) 0 02 % nebulizer solution Take 1 vial (0 5 mg total) by nebulization 4 (four) times a day, Starting Sat 1/12/2019, Normal      montelukast (SINGULAIR) 10 mg tablet Take 10 mg by mouth daily at bedtime, Historical Med      naproxen (NAPROSYN) 500 mg tablet Take 1 tablet (500 mg total) by mouth 2 (two) times a day with meals As needed for pain in foot, Starting Tue 7/27/2021, Normal      phentermine 37 5 MG capsule Take 37 5 mg by mouth, Starting u 10/22/2020, Historical Med      !! predniSONE 20 mg tablet Take 2 tablets (40 mg total) by mouth daily, Starting Sat 1/12/2019, Normal      !! predniSONE 20 mg tablet Take 2 tablets (40 mg total) by mouth daily, Starting Sun 7/10/2022, Print      SUMAtriptan (IMITREX) 25 mg tablet Take 2 tablets at onset of migraine and one tablet every 2 hours as needed, not more than 5 tablets in 24 hours, Historical Med      tiotropium (Spiriva Respimat) 1 25 MCG/ACT AERS inhaler Inhale 2 puffs, Starting u 10/22/2020, Historical Med      topiramate (TOPAMAX) 50 MG tablet Take 50 mg by mouth, Starting u 10/22/2020, Historical Med       !! - Potential duplicate medications found  Please discuss with provider  No discharge procedures on file      PDMP Review     None          ED Provider  Electronically Signed by           Mau Lui,   12/02/22 [id number]

## 2022-12-01 LAB
ATRIAL RATE: 86 BPM
P AXIS: 68 DEGREES
PR INTERVAL: 148 MS
QRS AXIS: 96 DEGREES
QRSD INTERVAL: 84 MS
QT INTERVAL: 390 MS
QTC INTERVAL: 466 MS
T WAVE AXIS: 58 DEGREES
VENTRICULAR RATE: 86 BPM

## 2024-02-01 ENCOUNTER — APPOINTMENT (EMERGENCY)
Dept: CT IMAGING | Facility: HOSPITAL | Age: 44
End: 2024-02-01
Payer: COMMERCIAL

## 2024-02-01 ENCOUNTER — HOSPITAL ENCOUNTER (EMERGENCY)
Facility: HOSPITAL | Age: 44
Discharge: HOME | End: 2024-02-01
Attending: EMERGENCY MEDICINE
Payer: COMMERCIAL

## 2024-02-01 ENCOUNTER — APPOINTMENT (EMERGENCY)
Dept: RADIOLOGY | Facility: HOSPITAL | Age: 44
End: 2024-02-01
Payer: COMMERCIAL

## 2024-02-01 VITALS
OXYGEN SATURATION: 93 % | TEMPERATURE: 97.9 F | BODY MASS INDEX: 36.69 KG/M2 | SYSTOLIC BLOOD PRESSURE: 121 MMHG | DIASTOLIC BLOOD PRESSURE: 65 MMHG | WEIGHT: 200.62 LBS | RESPIRATION RATE: 18 BRPM | HEART RATE: 79 BPM

## 2024-02-01 DIAGNOSIS — M54.12 CERVICAL RADICULOPATHY: Primary | ICD-10-CM

## 2024-02-01 DIAGNOSIS — G43.909 MIGRAINE HEADACHE: ICD-10-CM

## 2024-02-01 LAB
ALBUMIN SERPL BCP-MCNC: 4.5 G/DL (ref 3.5–5)
ALP SERPL-CCNC: 29 U/L (ref 34–104)
ALT SERPL W P-5'-P-CCNC: 15 U/L (ref 7–52)
ANION GAP SERPL CALCULATED.3IONS-SCNC: 6 MMOL/L
AST SERPL W P-5'-P-CCNC: 17 U/L (ref 13–39)
ATRIAL RATE: 88 BPM
BASOPHILS # BLD AUTO: 0.11 THOUSANDS/ÂΜL (ref 0–0.1)
BASOPHILS NFR BLD AUTO: 1 % (ref 0–1)
BILIRUB SERPL-MCNC: 0.76 MG/DL (ref 0.2–1)
BUN SERPL-MCNC: 9 MG/DL (ref 5–25)
CALCIUM SERPL-MCNC: 9.8 MG/DL (ref 8.4–10.2)
CARDIAC TROPONIN I PNL SERPL HS: 2 NG/L
CHLORIDE SERPL-SCNC: 105 MMOL/L (ref 96–108)
CO2 SERPL-SCNC: 26 MMOL/L (ref 21–32)
CREAT SERPL-MCNC: 0.99 MG/DL (ref 0.6–1.3)
EOSINOPHIL # BLD AUTO: 0.36 THOUSAND/ÂΜL (ref 0–0.61)
EOSINOPHIL NFR BLD AUTO: 4 % (ref 0–6)
ERYTHROCYTE [DISTWIDTH] IN BLOOD BY AUTOMATED COUNT: 13.4 % (ref 11.6–15.1)
GFR SERPL CREATININE-BSD FRML MDRD: 70 ML/MIN/1.73SQ M
GLUCOSE SERPL-MCNC: 83 MG/DL (ref 65–140)
HCT VFR BLD AUTO: 41.5 % (ref 34.8–46.1)
HGB BLD-MCNC: 13.7 G/DL (ref 11.5–15.4)
IMM GRANULOCYTES # BLD AUTO: 0.02 THOUSAND/UL (ref 0–0.2)
IMM GRANULOCYTES NFR BLD AUTO: 0 % (ref 0–2)
LYMPHOCYTES # BLD AUTO: 4.18 THOUSANDS/ÂΜL (ref 0.6–4.47)
LYMPHOCYTES NFR BLD AUTO: 52 % (ref 14–44)
MCH RBC QN AUTO: 29.3 PG (ref 26.8–34.3)
MCHC RBC AUTO-ENTMCNC: 33 G/DL (ref 31.4–37.4)
MCV RBC AUTO: 89 FL (ref 82–98)
MONOCYTES # BLD AUTO: 0.34 THOUSAND/ÂΜL (ref 0.17–1.22)
MONOCYTES NFR BLD AUTO: 4 % (ref 4–12)
NEUTROPHILS # BLD AUTO: 3.23 THOUSANDS/ÂΜL (ref 1.85–7.62)
NEUTS SEG NFR BLD AUTO: 39 % (ref 43–75)
NRBC BLD AUTO-RTO: 0 /100 WBCS
P AXIS: 82 DEGREES
PLATELET # BLD AUTO: 231 THOUSANDS/UL (ref 149–390)
PMV BLD AUTO: 10.4 FL (ref 8.9–12.7)
POTASSIUM SERPL-SCNC: 4.3 MMOL/L (ref 3.5–5.3)
PR INTERVAL: 140 MS
PROT SERPL-MCNC: 7.6 G/DL (ref 6.4–8.4)
QRS AXIS: 92 DEGREES
QRSD INTERVAL: 78 MS
QT INTERVAL: 384 MS
QTC INTERVAL: 464 MS
RBC # BLD AUTO: 4.67 MILLION/UL (ref 3.81–5.12)
SODIUM SERPL-SCNC: 137 MMOL/L (ref 135–147)
T WAVE AXIS: 57 DEGREES
VENTRICULAR RATE: 88 BPM
WBC # BLD AUTO: 8.24 THOUSAND/UL (ref 4.31–10.16)

## 2024-02-01 PROCEDURE — 85025 COMPLETE CBC W/AUTO DIFF WBC: CPT | Performed by: EMERGENCY MEDICINE

## 2024-02-01 PROCEDURE — 71045 X-RAY EXAM CHEST 1 VIEW: CPT

## 2024-02-01 PROCEDURE — 36415 COLL VENOUS BLD VENIPUNCTURE: CPT

## 2024-02-01 PROCEDURE — 80053 COMPREHEN METABOLIC PANEL: CPT | Performed by: EMERGENCY MEDICINE

## 2024-02-01 PROCEDURE — G1004 CDSM NDSC: HCPCS

## 2024-02-01 PROCEDURE — 99284 EMERGENCY DEPT VISIT MOD MDM: CPT

## 2024-02-01 PROCEDURE — 93005 ELECTROCARDIOGRAM TRACING: CPT

## 2024-02-01 PROCEDURE — 70450 CT HEAD/BRAIN W/O DYE: CPT

## 2024-02-01 PROCEDURE — 99285 EMERGENCY DEPT VISIT HI MDM: CPT | Performed by: EMERGENCY MEDICINE

## 2024-02-01 PROCEDURE — 84484 ASSAY OF TROPONIN QUANT: CPT | Performed by: EMERGENCY MEDICINE

## 2024-02-01 RX ORDER — OXYCODONE HYDROCHLORIDE AND ACETAMINOPHEN 5; 325 MG/1; MG/1
1 TABLET ORAL ONCE
Status: COMPLETED | OUTPATIENT
Start: 2024-02-01 | End: 2024-02-01

## 2024-02-01 RX ADMIN — OXYCODONE HYDROCHLORIDE AND ACETAMINOPHEN 1 TABLET: 5; 325 TABLET ORAL at 19:22

## 2024-02-01 NOTE — ED PROVIDER NOTES
Reports arm tingling history  Chief Complaint   Patient presents with    Tingling     C/o right arm tingling and tongue numbness for an hour.      HPI patient is a 43-year-old female reports for the last 2 hours she has had some right arm tingling.  Patient describes a uncomfortable numb type sensation in her right arm.  Patient reports normal use of her arm.  She reports that she feels like all of her fingers are cold on that hand.  She denies any trauma.  She denies any focal weakness.  Denies difficulty using the arm but reports that she has a tingling sensation down the arm from the right side of her neck to her right hand.  Patient reports at times after experiencing that she developed some numbness of her tongue.  She reports that seems normal now.  She never had any difficulty with speech or difficulty articulating her sentences.  Denies any weakness.  Denies any dizziness.  The patient reports some headache somewhat similar to a migraine usually her migraines are somewhat frontal this migraine seems to be more frontal and posterior.  Past medical history of anemia asthma migraines  Family history noncontributory  Social history, non-smoker no history of drug abuse    Prior to Admission Medications   Prescriptions Last Dose Informant Patient Reported? Taking?   SUMAtriptan (IMITREX) 25 mg tablet  Self Yes No   Sig: Take 2 tablets at onset of migraine and one tablet every 2 hours as needed, not more than 5 tablets in 24 hours   albuterol (2.5 mg/3 mL) 0.083 % nebulizer solution  Self Yes No   Sig: every 6 (six) hours as needed    albuterol (PROVENTIL HFA,VENTOLIN HFA) 90 mcg/act inhaler  Self No No   Sig: Inhale 2 puffs every 4 (four) hours as needed for wheezing   albuterol (ProAir HFA) 90 mcg/act inhaler   No No   Sig: Inhale 2 puffs every 6 (six) hours as needed for wheezing   budesonide-formoterol (SYMBICORT) 160-4.5 mcg/act inhaler  Self Yes No   Sig: Inhale 2 puffs   cetirizine (ZYRTEC ALLERGY) 10 mg  tablet  Self Yes No   Sig: Take 10 mg by mouth daily   cyclobenzaprine (FLEXERIL) 10 mg tablet   No No   Sig: Take 1 tablet (10 mg total) by mouth 3 (three) times a day as needed for muscle spasms   ipratropium (ATROVENT) 0.02 % nebulizer solution  Self No No   Sig: Take 1 vial (0.5 mg total) by nebulization 4 (four) times a day   montelukast (SINGULAIR) 10 mg tablet  Self Yes No   Sig: Take 10 mg by mouth daily at bedtime   naproxen (NAPROSYN) 500 mg tablet   No No   Sig: Take 1 tablet (500 mg total) by mouth 2 (two) times a day with meals As needed for pain in foot   phentermine 37.5 MG capsule  Self Yes No   Sig: Take 37.5 mg by mouth   predniSONE 20 mg tablet  Self No No   Sig: Take 2 tablets (40 mg total) by mouth daily   predniSONE 20 mg tablet   No No   Sig: Take 2 tablets (40 mg total) by mouth daily   tiotropium (Spiriva Respimat) 1.25 MCG/ACT AERS inhaler  Self Yes No   Sig: Inhale 2 puffs   topiramate (TOPAMAX) 50 MG tablet  Self Yes No   Sig: Take 50 mg by mouth      Facility-Administered Medications: None       Past Medical History:   Diagnosis Date    Anemia     Asthma     Meningitis     Migraines        Past Surgical History:   Procedure Laterality Date    DILATION AND CURETTAGE OF UTERUS      SURGICALLY INDUCED  BY [facility]    ECTOPIC PREGNANCY SURGERY Left     SALPINGECTOMY Left     RESOLVED 2010       Family History   Problem Relation Age of Onset    Breast cancer Mother     Diabetes Mother     Hypertension Mother     Osteoporosis Mother     Hypertension Father     Asthma Brother     Breast cancer Maternal Grandmother     Leukemia Maternal Grandmother     Hypertension Maternal Grandmother     Hypertension Paternal Grandmother     Heart disease Paternal Grandmother     Alzheimer's disease Paternal Grandmother     Dementia Paternal Grandmother     No Known Problems Paternal Grandfather     Breast cancer Maternal Aunt     Hypertension Family     Leukemia Daughter      I have reviewed and agree with  the history as documented.    E-Cigarette/Vaping    E-Cigarette Use Never User      E-Cigarette/Vaping Substances     Social History     Tobacco Use    Smoking status: Never    Smokeless tobacco: Never   Vaping Use    Vaping status: Never Used   Substance Use Topics    Alcohol use: Yes     Comment: socially    Drug use: No       Review of Systems   Constitutional:  Negative for fever.   HENT:  Negative for congestion.    Eyes:  Negative for pain and redness.   Respiratory:  Negative for cough and shortness of breath.    Cardiovascular:  Negative for chest pain.   Gastrointestinal:  Negative for abdominal pain and vomiting.   Neurological:  Positive for numbness and headaches. Negative for weakness.       Physical Exam  Physical Exam  Vitals and nursing note reviewed.   Constitutional:       Appearance: She is well-developed.   HENT:      Head: Normocephalic.      Right Ear: External ear normal.      Left Ear: External ear normal.      Nose: Nose normal.      Mouth/Throat:      Mouth: Mucous membranes are moist.      Pharynx: Oropharynx is clear.   Eyes:      General: Lids are normal.      Extraocular Movements: Extraocular movements intact.      Pupils: Pupils are equal, round, and reactive to light.   Cardiovascular:      Pulses: Normal pulses.      Heart sounds: Normal heart sounds.   Pulmonary:      Effort: Pulmonary effort is normal. No respiratory distress.      Breath sounds: Normal breath sounds.   Musculoskeletal:         General: No deformity. Normal range of motion.      Cervical back: Normal range of motion and neck supple.   Skin:     General: Skin is warm and dry.   Neurological:      Mental Status: She is alert and oriented to person, place, and time.      GCS: GCS eye subscore is 4. GCS verbal subscore is 5. GCS motor subscore is 6.      Cranial Nerves: Cranial nerves 2-12 are intact.      Motor: Motor function is intact.      Coordination: Romberg sign negative.      Comments: No ataxia, normal  finger-to-nose, normal ambulation.  Patient reports numbness but has 2 point sensation down her whole arm.  She reports a burning sensation from the right neck down her arm to her fingertips primarily in the thumb and index finger.   Psychiatric:         Mood and Affect: Mood normal.         Vital Signs  ED Triage Vitals   Temperature Pulse Respirations Blood Pressure SpO2   02/01/24 1554 02/01/24 1554 02/01/24 1554 02/01/24 1554 02/01/24 1554   97.9 °F (36.6 °C) 86 18 136/90 100 %      Temp Source Heart Rate Source Patient Position - Orthostatic VS BP Location FiO2 (%)   02/01/24 1554 02/01/24 1554 02/01/24 1554 02/01/24 1554 --   Temporal Monitor Sitting Left arm       Pain Score       02/01/24 1922       8           Vitals:    02/01/24 1554 02/01/24 1754   BP: 136/90 121/65   Pulse: 86 79   Patient Position - Orthostatic VS: Sitting Sitting         Visual Acuity      ED Medications  Medications   oxyCODONE-acetaminophen (PERCOCET) 5-325 mg per tablet 1 tablet (1 tablet Oral Given 2/1/24 1922)       Diagnostic Studies  Results Reviewed       Procedure Component Value Units Date/Time    HS Troponin 0hr (reflex protocol) [[id number]]  (Normal) Collected: 02/01/24 1602    Lab Status: Final result Specimen: Blood from Arm, Right Updated: 02/01/24 1635     hs TnI 0hr 2 ng/L     Comprehensive metabolic panel [[id number]]  (Abnormal) Collected: 02/01/24 1602    Lab Status: Final result Specimen: Blood from Arm, Right Updated: 02/01/24 1629     Sodium 137 mmol/L      Potassium 4.3 mmol/L      Chloride 105 mmol/L      CO2 26 mmol/L      ANION GAP 6 mmol/L      BUN 9 mg/dL      Creatinine 0.99 mg/dL      Glucose 83 mg/dL      Calcium 9.8 mg/dL      AST 17 U/L      ALT 15 U/L      Alkaline Phosphatase 29 U/L      Total Protein 7.6 g/dL      Albumin 4.5 g/dL      Total Bilirubin 0.76 mg/dL      eGFR 70 ml/min/1.73sq m     Narrative:      National Kid[employer] guidelines for Chronic Kidney Disease (CKD):     Stage 1  with normal or high GFR (GFR > 90 mL/min/1.73 square meters)    Stage 2 Mild CKD (GFR = 60-89 mL/min/1.73 square meters)    Stage 3A Moderate CKD (GFR = 45-59 mL/min/1.73 square meters)    Stage 3B Moderate CKD (GFR = 30-44 mL/min/1.73 square meters)    Stage 4 Severe CKD (GFR = 15-29 mL/min/1.73 square meters)    Stage 5 End Stage CKD (GFR <15 mL/min/1.73 square meters)  Note: GFR calculation is accurate only with a steady state creatinine    CBC and differential [[id number]]  (Abnormal) Collected: 02/01/24 1602    Lab Status: Final result Specimen: Blood from Arm, Right Updated: 02/01/24 1612     WBC 8.24 Thousand/uL      RBC 4.67 Million/uL      Hemoglobin 13.7 g/dL      Hematocrit 41.5 %      MCV 89 fL      MCH 29.3 pg      MCHC 33.0 g/dL      RDW 13.4 %      MPV 10.4 fL      Platelets 231 Thousands/uL      nRBC 0 /100 WBCs      Neutrophils Relative 39 %      Immat GRANS % 0 %      Lymphocytes Relative 52 %      Monocytes Relative 4 %      Eosinophils Relative 4 %      Basophils Relative 1 %      Neutrophils Absolute 3.23 Thousands/µL      Immature Grans Absolute 0.02 Thousand/uL      Lymphocytes Absolute 4.18 Thousands/µL      Monocytes Absolute 0.34 Thousand/µL      Eosinophils Absolute 0.36 Thousand/µL      Basophils Absolute 0.11 Thousands/µL                    CT head without contrast   Final Result by Jurgen Cobb MD (02/01 1841)      No acute intracranial abnormality.                  Workstation performed: [id number]         XR chest 1 view portable   Final Result by Bob Monge MD (02/02 0837)      No acute cardiopulmonary disease.      Findings are stable      Workstation performed: [id number]                    Procedures  Procedures         ED Course     Chest x-ray: Chest x-ray showed a normal cardiac silhouette, no pneumothorax no infiltrates, No sign of pathology, interpreted by me, I was the primary .      Nursing initially saw the patient in triage and ordered a cardiac workup  due to the arm tingling.  Patient had no chest pain.  EKG showed no acute changes.  Cardiac troponin was negative no sign of cardiac ischemia electrolytes are within normal limits.  White count was normal at 8.2 no sign inflammation hemoglobin was normal at 13 no sign of anemia.    Patient reported some headache she does have a history of migraines which could be consistent with her symptoms but patient reported her headache was somewhat different more posterior so CT was performed it was negative.  No sign of bleeding.  No sign of intracranial pathology.                    SBIRT 22yo+      Flowsheet Row Most Recent Value   Initial Alcohol Screen: US AUDIT-C     1. How often do you have a drink containing alcohol? 0 Filed at: 02/01/2024 1557   2. How many drinks containing alcohol do you have on a typical day you are drinking?  0 Filed at: 02/01/2024 1557   3a. Male UNDER 65: How often do you have five or more drinks on one occasion? 0 Filed at: 02/01/2024 1557   3b. FEMALE Any Age, or MALE 65+: How often do you have 4 or more drinks on one occassion? 0 Filed at: 02/01/2024 1557   Audit-C Score 0 Filed at: 02/01/2024 1557   HOA: How many times in the past year have you...    Used an illegal drug or used a prescription medication for non-medical reasons? Never Filed at: 02/01/2024 1557                      Medical Decision Making    Medical decision making 43-year-old female presents emergency department with some tingling sensation in her right arm, initial triage evaluation by nursing was for cardiac etiology but her EKG and cardiac troponin are normal.  Patient describes an uncomfortable burning sensation from her right neck down to the thumb and index finger more consistent with a cervical radiculopathy.  Patient had no other focal weakness.  She had normal ambulation.  Patient did have a headache which was somewhat consistent with her migraines although reports some different posterior head pain therefore CT  was done.  CT was negative.  No intracranial pathology.  Discussed with patient this is most consistent with a cervical radiculopathy not a central lesion.  It also could be consistent with symptoms related to her migraine.  We discussed analgesics.  Patient preferred a pill here and will go home to sleep.  We discussed outpatient treatment and follow-up we discussed indications to return.  No sign of acute intracranial pathology at this time.  No indication for further diagnostic workup or admission to the hospital.  No significant deficit.  Discussed outpatient treatment and follow-up such as MRI    Amount and/or Complexity of Data Reviewed  Labs: ordered.  Radiology: ordered.    Risk  Prescription drug management.             Disposition  Final diagnoses:   Cervical radiculopathy   Migraine headache     Time reflects when diagnosis was documented in both MDM as applicable and the Disposition within this note       Time User Action Codes Description Comment    2/1/2024  7:02 PM Gera Farias [M54.12] Cervical radiculopathy     2/1/2024  7:02 PM Gera Farias [G43.909] Migraine headache           ED Disposition       ED Disposition   Discharge    Condition   Stable    Date/Time   Thu Feb 1, 2024 1902    Comment   Maximo Livingston discharge to home/self care.                   Follow-up Information    None         Discharge Medication List as of 2/1/2024  7:04 PM        CONTINUE these medications which have NOT CHANGED    Details   albuterol (2.5 mg/3 mL) 0.083 % nebulizer solution every 6 (six) hours as needed , Starting Tue 7/18/2017, Historical Med      !! albuterol (ProAir HFA) 90 mcg/act inhaler Inhale 2 puffs every 6 (six) hours as needed for wheezing, Starting Wed 11/30/2022, Normal      !! albuterol (PROVENTIL HFA,VENTOLIN HFA) 90 mcg/act inhaler Inhale 2 puffs every 4 (four) hours as needed for wheezing, Starting Fri 6/8/2018, Print      budesonide-formoterol (SYMBICORT) 160-4.5 mcg/act inhaler  Inhale 2 puffs, Starting u 10/22/2020, Historical Med      cetirizine (ZYRTEC ALLERGY) 10 mg tablet Take 10 mg by mouth daily, Historical Med      cyclobenzaprine (FLEXERIL) 10 mg tablet Take 1 tablet (10 mg total) by mouth 3 (three) times a day as needed for muscle spasms, Starting Sun 7/10/2022, Print      ipratropium (ATROVENT) 0.02 % nebulizer solution Take 1 vial (0.5 mg total) by nebulization 4 (four) times a day, Starting Sat 1/12/2019, Normal      montelukast (SINGULAIR) 10 mg tablet Take 10 mg by mouth daily at bedtime, Historical Med      naproxen (NAPROSYN) 500 mg tablet Take 1 tablet (500 mg total) by mouth 2 (two) times a day with meals As needed for pain in foot, Starting Tue 7/27/2021, Normal      phentermine 37.5 MG capsule Take 37.5 mg by mouth, Starting Thu 10/22/2020, Historical Med      !! predniSONE 20 mg tablet Take 2 tablets (40 mg total) by mouth daily, Starting Sat 1/12/2019, Normal      !! predniSONE 20 mg tablet Take 2 tablets (40 mg total) by mouth daily, Starting Sun 7/10/2022, Print      SUMAtriptan (IMITREX) 25 mg tablet Take 2 tablets at onset of migraine and one tablet every 2 hours as needed, not more than 5 tablets in 24 hours, Historical Med      tiotropium (Spiriva Respimat) 1.25 MCG/ACT AERS inhaler Inhale 2 puffs, Starting Thu 10/22/2020, Historical Med      topiramate (TOPAMAX) 50 MG tablet Take 50 mg by mouth, Starting Thu 10/22/2020, Historical Med       !! - Potential duplicate medications found. Please discuss with provider.          No discharge procedures on file.    PDMP Review       None            ED Provider  Electronically Signed by             Gera Farias MD  02/02/24 [id number]

## 2024-02-02 NOTE — DISCHARGE INSTRUCTIONS
Home to sleep  Rest  Symptoms of the migraine should resolve after sleeping  Numbness may related to a pinched nerve on the right side of your neck and may require further imaging in the future  Follow-up with your provider or return worsening pain increasing numbness or any problems

## 2024-05-07 ENCOUNTER — APPOINTMENT (EMERGENCY)
Dept: RADIOLOGY | Facility: HOSPITAL | Age: 44
End: 2024-05-07
Payer: COMMERCIAL

## 2024-05-07 ENCOUNTER — HOSPITAL ENCOUNTER (EMERGENCY)
Facility: HOSPITAL | Age: 44
Discharge: HOME | End: 2024-05-08
Attending: EMERGENCY MEDICINE
Payer: COMMERCIAL

## 2024-05-07 DIAGNOSIS — M25.572 LEFT ANKLE PAIN: Primary | ICD-10-CM

## 2024-05-07 PROCEDURE — 99283 EMERGENCY DEPT VISIT LOW MDM: CPT

## 2024-05-07 PROCEDURE — 73630 X-RAY EXAM OF FOOT: CPT

## 2024-05-08 VITALS
OXYGEN SATURATION: 100 % | HEART RATE: 76 BPM | DIASTOLIC BLOOD PRESSURE: 80 MMHG | RESPIRATION RATE: 18 BRPM | SYSTOLIC BLOOD PRESSURE: 116 MMHG | TEMPERATURE: 97.8 F

## 2024-05-08 PROCEDURE — 99284 EMERGENCY DEPT VISIT MOD MDM: CPT | Performed by: EMERGENCY MEDICINE

## 2024-05-08 RX ORDER — METHOCARBAMOL 500 MG/1
500 TABLET, FILM COATED ORAL 2 TIMES DAILY
Qty: 20 TABLET | Refills: 0 | Status: SHIPPED | OUTPATIENT
Start: 2024-05-08

## 2024-05-08 NOTE — ED PROVIDER NOTES
History  Chief Complaint   Patient presents with    Foot Pain     Left foot pain for the past 4 days, denies injury      43-year-old female presenting to the emergency department for evaluation of left foot pain for the past 4 days, patient has some swelling and tenderness over the left lateral malleolus anteriorly, does not recall falling or injuring her ankle.        Prior to Admission Medications   Prescriptions Last Dose Informant Patient Reported? Taking?   SUMAtriptan (IMITREX) 25 mg tablet  Self Yes No   Sig: Take 2 tablets at onset of migraine and one tablet every 2 hours as needed, not more than 5 tablets in 24 hours   albuterol (2.5 mg/3 mL) 0.083 % nebulizer solution  Self Yes No   Sig: every 6 (six) hours as needed    albuterol (PROVENTIL HFA,VENTOLIN HFA) 90 mcg/act inhaler  Self No No   Sig: Inhale 2 puffs every 4 (four) hours as needed for wheezing   albuterol (ProAir HFA) 90 mcg/act inhaler   No No   Sig: Inhale 2 puffs every 6 (six) hours as needed for wheezing   budesonide-formoterol (SYMBICORT) 160-4.5 mcg/act inhaler  Self Yes No   Sig: Inhale 2 puffs   cetirizine (ZYRTEC ALLERGY) 10 mg tablet  Self Yes No   Sig: Take 10 mg by mouth daily   cyclobenzaprine (FLEXERIL) 10 mg tablet   No No   Sig: Take 1 tablet (10 mg total) by mouth 3 (three) times a day as needed for muscle spasms   ipratropium (ATROVENT) 0.02 % nebulizer solution  Self No No   Sig: Take 1 vial (0.5 mg total) by nebulization 4 (four) times a day   montelukast (SINGULAIR) 10 mg tablet  Self Yes No   Sig: Take 10 mg by mouth daily at bedtime   naproxen (NAPROSYN) 500 mg tablet   No No   Sig: Take 1 tablet (500 mg total) by mouth 2 (two) times a day with meals As needed for pain in foot   phentermine 37.5 MG capsule  Self Yes No   Sig: Take 37.5 mg by mouth   predniSONE 20 mg tablet  Self No No   Sig: Take 2 tablets (40 mg total) by mouth daily   predniSONE 20 mg tablet   No No   Sig: Take 2 tablets (40 mg total) by mouth daily    tiotropium (Spiriva Respimat) 1.25 MCG/ACT AERS inhaler  Self Yes No   Sig: Inhale 2 puffs   topiramate (TOPAMAX) 50 MG tablet  Self Yes No   Sig: Take 50 mg by mouth      Facility-Administered Medications: None       Past Medical History:   Diagnosis Date    Anemia     Asthma     Meningitis     Migraines        Past Surgical History:   Procedure Laterality Date    DILATION AND CURETTAGE OF UTERUS      SURGICALLY INDUCED  BY D&C    ECTOPIC PREGNANCY SURGERY Left     SALPINGECTOMY Left     RESOLVED 2010       Family History   Problem Relation Age of Onset    Breast cancer Mother     Diabetes Mother     Hypertension Mother     Osteoporosis Mother     Hypertension Father     Asthma Brother     Breast cancer Maternal Grandmother     Leukemia Maternal Grandmother     Hypertension Maternal Grandmother     Hypertension Paternal Grandmother     Heart disease Paternal Grandmother     Alzheimer's disease Paternal Grandmother     Dementia Paternal Grandmother     No Known Problems Paternal Grandfather     Breast cancer Maternal Aunt     Hypertension Family     Leukemia Daughter      I have reviewed and agree with the history as documented.    E-Cigarette/Vaping    E-Cigarette Use Never User      E-Cigarette/Vaping Substances     Social History     Tobacco Use    Smoking status: Never    Smokeless tobacco: Never   Vaping Use    Vaping status: Never Used   Substance Use Topics    Alcohol use: Yes     Comment: socially    Drug use: No       Review of Systems   Constitutional:  Negative for appetite change, chills, fatigue and fever.   HENT:  Negative for sneezing and sore throat.    Eyes:  Negative for visual disturbance.   Respiratory:  Negative for cough, choking, chest tightness, shortness of breath and wheezing.    Cardiovascular:  Negative for chest pain and palpitations.   Gastrointestinal:  Negative for abdominal pain, constipation, diarrhea, nausea and vomiting.   Genitourinary:  Negative for difficulty  urinating and dysuria.   Musculoskeletal:  Positive for arthralgias.   Neurological:  Negative for dizziness, weakness, light-headedness, numbness and headaches.   All other systems reviewed and are negative.      Physical Exam  Physical Exam  Vitals and nursing note reviewed.   Constitutional:       General: She is not in acute distress.     Appearance: She is well-developed. She is not diaphoretic.   HENT:      Head: Normocephalic and atraumatic.   Eyes:      Pupils: Pupils are equal, round, and reactive to light.   Neck:      Vascular: No JVD.      Trachea: No tracheal deviation.   Cardiovascular:      Rate and Rhythm: Normal rate and regular rhythm.      Heart sounds: Normal heart sounds. No murmur heard.     No friction rub. No gallop.   Pulmonary:      Effort: Pulmonary effort is normal. No respiratory distress.      Breath sounds: Normal breath sounds. No wheezing or rales.   Abdominal:      General: Bowel sounds are normal. There is no distension.      Palpations: Abdomen is soft.      Tenderness: There is no abdominal tenderness. There is no guarding or rebound.   Musculoskeletal:      Comments: Left lateral malleolus swelling.   Skin:     General: Skin is warm and dry.      Coloration: Skin is not pale.   Neurological:      Mental Status: She is alert and oriented to person, place, and time.      Cranial Nerves: No cranial nerve deficit.      Motor: No abnormal muscle tone.   Psychiatric:         Behavior: Behavior normal.         Vital Signs  ED Triage Vitals [05/07/24 2257]   Temperature Pulse Respirations Blood Pressure SpO2   97.8 °F (36.6 °C) 73 20 112/83 99 %      Temp Source Heart Rate Source Patient Position - Orthostatic VS BP Location FiO2 (%)   Temporal Monitor Sitting Left arm --      Pain Score       --           Vitals:    05/07/24 2257 05/08/24 0131   BP: 112/83 116/80   Pulse: 73 76   Patient Position - Orthostatic VS: Sitting          Visual Acuity      ED Medications  Medications - No  data to display    Diagnostic Studies  Results Reviewed       None                   XR foot 3+ views LEFT    (Results Pending)              Procedures  Procedures         ED Course                                             Medical Decision Making  43-year-old female with thickened pain will check x-ray reassess.     Amount and/or Complexity of Data Reviewed  Radiology: ordered.    Risk  Prescription drug management.             Disposition  Final diagnoses:   Left ankle pain     Time reflects when diagnosis was documented in both MDM as applicable and the Disposition within this note       Time User Action Codes Description Comment    5/8/2024 12:59 AM Cash Richards [M25.572] Left ankle pain           ED Disposition       ED Disposition   Discharge    Condition   Stable    Date/Time   Wed May 8, 2024 12:59 AM    Comment   Maximo Livingston discharge to home/self care.                   Follow-up Information       Follow up With Specialties Details Why Contact Info Additional Information    [facility]   [address]  [facility] [address]  [phone] [facility] [address], [place], [place], [address]   [phone]            Discharge Medication List as of 5/8/2024  1:00 AM        START taking these medications    Details   methocarbamol (ROBAXIN) 500 mg tablet Take 1 tablet (500 mg total) by mouth 2 (two) times a day, Starting Wed 5/8/2024, Normal           CONTINUE these medications which have NOT CHANGED    Details   albuterol (2.5 mg/3 mL) 0.083 % nebulizer solution every 6 (six) hours as needed , Starting Tue 7/18/2017, Historical Med      !! albuterol (ProAir HFA) 90 mcg/act inhaler Inhale 2 puffs every 6 (six) hours as needed for wheezing, Starting Wed 11/30/2022, Normal      !! albuterol (PROVENTIL HFA,VENTOLIN HFA) 90 mcg/act inhaler Inhale 2 puffs  every 4 (four) hours as needed for wheezing, Starting Fri 6/8/2018, Print      budesonide-formoterol (SYMBICORT) 160-4.5 mcg/act inhaler Inhale 2 puffs, Starting Thu 10/22/2020, Historical Med      cetirizine (ZYRTEC ALLERGY) 10 mg tablet Take 10 mg by mouth daily, Historical Med      cyclobenzaprine (FLEXERIL) 10 mg tablet Take 1 tablet (10 mg total) by mouth 3 (three) times a day as needed for muscle spasms, Starting Sun 7/10/2022, Print      ipratropium (ATROVENT) 0.02 % nebulizer solution Take 1 vial (0.5 mg total) by nebulization 4 (four) times a day, Starting Sat 1/12/2019, Normal      montelukast (SINGULAIR) 10 mg tablet Take 10 mg by mouth daily at bedtime, Historical Med      naproxen (NAPROSYN) 500 mg tablet Take 1 tablet (500 mg total) by mouth 2 (two) times a day with meals As needed for pain in foot, Starting Tue 7/27/2021, Normal      phentermine 37.5 MG capsule Take 37.5 mg by mouth, Starting u 10/22/2020, Historical Med      !! predniSONE 20 mg tablet Take 2 tablets (40 mg total) by mouth daily, Starting Sat 1/12/2019, Normal      !! predniSONE 20 mg tablet Take 2 tablets (40 mg total) by mouth daily, Starting Sun 7/10/2022, Print      SUMAtriptan (IMITREX) 25 mg tablet Take 2 tablets at onset of migraine and one tablet every 2 hours as needed, not more than 5 tablets in 24 hours, Historical Med      tiotropium (Spiriva Respimat) 1.25 MCG/ACT AERS inhaler Inhale 2 puffs, Starting u 10/22/2020, Historical Med      topiramate (TOPAMAX) 50 MG tablet Take 50 mg by mouth, Starting Thu 10/22/2020, Historical Med       !! - Potential duplicate medications found. Please discuss with provider.          No discharge procedures on file.    PDMP Review       None            ED Provider  Electronically Signed by             Cash Richards MD  05/08/24 5346

## 2024-07-31 ENCOUNTER — APPOINTMENT (EMERGENCY)
Dept: RADIOLOGY | Facility: HOSPITAL | Age: 44
End: 2024-07-31
Payer: COMMERCIAL

## 2024-07-31 ENCOUNTER — HOSPITAL ENCOUNTER (EMERGENCY)
Facility: HOSPITAL | Age: 44
Discharge: HOME | End: 2024-07-31
Attending: EMERGENCY MEDICINE
Payer: COMMERCIAL

## 2024-07-31 VITALS
OXYGEN SATURATION: 100 % | HEIGHT: 62 IN | SYSTOLIC BLOOD PRESSURE: 138 MMHG | RESPIRATION RATE: 18 BRPM | DIASTOLIC BLOOD PRESSURE: 74 MMHG | HEART RATE: 73 BPM | BODY MASS INDEX: 35.88 KG/M2 | WEIGHT: 195 LBS | TEMPERATURE: 98 F

## 2024-07-31 DIAGNOSIS — R07.9 CHEST PAIN: Primary | ICD-10-CM

## 2024-07-31 LAB
ALBUMIN SERPL BCG-MCNC: 4.4 G/DL (ref 3.5–5)
ALP SERPL-CCNC: 24 U/L (ref 34–104)
ALT SERPL W P-5'-P-CCNC: 12 U/L (ref 7–52)
ANION GAP SERPL CALCULATED.3IONS-SCNC: 8 MMOL/L (ref 4–13)
AST SERPL W P-5'-P-CCNC: 15 U/L (ref 13–39)
ATRIAL RATE: 97 BPM
BASOPHILS # BLD AUTO: 0.05 THOUSANDS/ÂΜL (ref 0–0.1)
BASOPHILS NFR BLD AUTO: 1 % (ref 0–1)
BILIRUB SERPL-MCNC: 0.37 MG/DL (ref 0.2–1)
BUN SERPL-MCNC: 10 MG/DL (ref 5–25)
CALCIUM SERPL-MCNC: 9.8 MG/DL (ref 8.4–10.2)
CARDIAC TROPONIN I PNL SERPL HS: <2 NG/L
CARDIAC TROPONIN I PNL SERPL HS: <2 NG/L
CHLORIDE SERPL-SCNC: 104 MMOL/L (ref 96–108)
CO2 SERPL-SCNC: 26 MMOL/L (ref 21–32)
CREAT SERPL-MCNC: 1.06 MG/DL (ref 0.6–1.3)
EOSINOPHIL # BLD AUTO: 0.3 THOUSAND/ÂΜL (ref 0–0.61)
EOSINOPHIL NFR BLD AUTO: 4 % (ref 0–6)
ERYTHROCYTE [DISTWIDTH] IN BLOOD BY AUTOMATED COUNT: 13.2 % (ref 11.6–15.1)
GFR SERPL CREATININE-BSD FRML MDRD: 64 ML/MIN/1.73SQ M
GLUCOSE SERPL-MCNC: 93 MG/DL (ref 65–140)
HCT VFR BLD AUTO: 36.3 % (ref 34.8–46.1)
HGB BLD-MCNC: 12.5 G/DL (ref 11.5–15.4)
IMM GRANULOCYTES # BLD AUTO: 0.02 THOUSAND/UL (ref 0–0.2)
IMM GRANULOCYTES NFR BLD AUTO: 0 % (ref 0–2)
LYMPHOCYTES # BLD AUTO: 4.1 THOUSANDS/ÂΜL (ref 0.6–4.47)
LYMPHOCYTES NFR BLD AUTO: 55 % (ref 14–44)
MCH RBC QN AUTO: 29.6 PG (ref 26.8–34.3)
MCHC RBC AUTO-ENTMCNC: 34.4 G/DL (ref 31.4–37.4)
MCV RBC AUTO: 86 FL (ref 82–98)
MONOCYTES # BLD AUTO: 0.3 THOUSAND/ÂΜL (ref 0.17–1.22)
MONOCYTES NFR BLD AUTO: 4 % (ref 4–12)
NEUTROPHILS # BLD AUTO: 2.69 THOUSANDS/ÂΜL (ref 1.85–7.62)
NEUTS SEG NFR BLD AUTO: 36 % (ref 43–75)
NRBC BLD AUTO-RTO: 0 /100 WBCS
P AXIS: 74 DEGREES
PLATELET # BLD AUTO: 198 THOUSANDS/UL (ref 149–390)
PMV BLD AUTO: 10.6 FL (ref 8.9–12.7)
POTASSIUM SERPL-SCNC: 3.5 MMOL/L (ref 3.5–5.3)
PR INTERVAL: 144 MS
PROT SERPL-MCNC: 7.3 G/DL (ref 6.4–8.4)
QRS AXIS: 81 DEGREES
QRSD INTERVAL: 86 MS
QT INTERVAL: 364 MS
QTC INTERVAL: 462 MS
RBC # BLD AUTO: 4.22 MILLION/UL (ref 3.81–5.12)
SODIUM SERPL-SCNC: 138 MMOL/L (ref 135–147)
T WAVE AXIS: 62 DEGREES
VENTRICULAR RATE: 97 BPM
WBC # BLD AUTO: 7.46 THOUSAND/UL (ref 4.31–10.16)

## 2024-07-31 PROCEDURE — 80053 COMPREHEN METABOLIC PANEL: CPT | Performed by: EMERGENCY MEDICINE

## 2024-07-31 PROCEDURE — 85025 COMPLETE CBC W/AUTO DIFF WBC: CPT | Performed by: EMERGENCY MEDICINE

## 2024-07-31 PROCEDURE — 93005 ELECTROCARDIOGRAM TRACING: CPT

## 2024-07-31 PROCEDURE — 99285 EMERGENCY DEPT VISIT HI MDM: CPT

## 2024-07-31 PROCEDURE — 36415 COLL VENOUS BLD VENIPUNCTURE: CPT | Performed by: EMERGENCY MEDICINE

## 2024-07-31 PROCEDURE — 71045 X-RAY EXAM CHEST 1 VIEW: CPT

## 2024-07-31 PROCEDURE — 99284 EMERGENCY DEPT VISIT MOD MDM: CPT | Performed by: EMERGENCY MEDICINE

## 2024-07-31 PROCEDURE — 93010 ELECTROCARDIOGRAM REPORT: CPT | Performed by: INTERNAL MEDICINE

## 2024-07-31 PROCEDURE — 96374 THER/PROPH/DIAG INJ IV PUSH: CPT

## 2024-07-31 PROCEDURE — 84484 ASSAY OF TROPONIN QUANT: CPT | Performed by: EMERGENCY MEDICINE

## 2024-07-31 RX ORDER — METHOCARBAMOL 500 MG/1
500 TABLET, FILM COATED ORAL 2 TIMES DAILY
Qty: 20 TABLET | Refills: 0 | Status: SHIPPED | OUTPATIENT
Start: 2024-07-31

## 2024-07-31 RX ORDER — IBUPROFEN 800 MG/1
800 TABLET ORAL 3 TIMES DAILY
Qty: 21 TABLET | Refills: 0 | Status: SHIPPED | OUTPATIENT
Start: 2024-07-31

## 2024-07-31 RX ORDER — KETOROLAC TROMETHAMINE 30 MG/ML
15 INJECTION, SOLUTION INTRAMUSCULAR; INTRAVENOUS ONCE
Status: COMPLETED | OUTPATIENT
Start: 2024-07-31 | End: 2024-07-31

## 2024-07-31 RX ADMIN — KETOROLAC TROMETHAMINE 15 MG: 30 INJECTION, SOLUTION INTRAMUSCULAR; INTRAVENOUS at 01:48

## 2024-08-04 NOTE — ED PROVIDER NOTES
History  Chief Complaint   Patient presents with    Chest Pain     Chest pain started 45 minutes in the center of the chest with heaviness.  Has had it over the last week but worse tonight.      43-year-old female present to the emergency department for evaluation of chest pain.  Patient had patient started 45 minutes ago.  Scribes it is substernal heaviness, intermittent over the past week but more persistent today.        Prior to Admission Medications   Prescriptions Last Dose Informant Patient Reported? Taking?   SUMAtriptan (IMITREX) 25 mg tablet  Self Yes No   Sig: Take 2 tablets at onset of migraine and one tablet every 2 hours as needed, not more than 5 tablets in 24 hours   albuterol (2.5 mg/3 mL) 0.083 % nebulizer solution  Self Yes No   Sig: every 6 (six) hours as needed    albuterol (PROVENTIL HFA,VENTOLIN HFA) 90 mcg/act inhaler  Self No No   Sig: Inhale 2 puffs every 4 (four) hours as needed for wheezing   albuterol (ProAir HFA) 90 mcg/act inhaler   No No   Sig: Inhale 2 puffs every 6 (six) hours as needed for wheezing   budesonide-formoterol (SYMBICORT) 160-4.5 mcg/act inhaler  Self Yes No   Sig: Inhale 2 puffs   cetirizine (ZYRTEC ALLERGY) 10 mg tablet  Self Yes No   Sig: Take 10 mg by mouth daily   cyclobenzaprine (FLEXERIL) 10 mg tablet   No No   Sig: Take 1 tablet (10 mg total) by mouth 3 (three) times a day as needed for muscle spasms   ipratropium (ATROVENT) 0.02 % nebulizer solution  Self No No   Sig: Take 1 vial (0.5 mg total) by nebulization 4 (four) times a day   methocarbamol (ROBAXIN) 500 mg tablet   No No   Sig: Take 1 tablet (500 mg total) by mouth 2 (two) times a day   montelukast (SINGULAIR) 10 mg tablet  Self Yes No   Sig: Take 10 mg by mouth daily at bedtime   naproxen (NAPROSYN) 500 mg tablet   No No   Sig: Take 1 tablet (500 mg total) by mouth 2 (two) times a day with meals As needed for pain in foot   phentermine 37.5 MG capsule  Self Yes No   Sig: Take 37.5 mg by mouth    predniSONE 20 mg tablet  Self No No   Sig: Take 2 tablets (40 mg total) by mouth daily   predniSONE 20 mg tablet   No No   Sig: Take 2 tablets (40 mg total) by mouth daily   tiotropium (Spiriva Respimat) 1.25 MCG/ACT AERS inhaler  Self Yes No   Sig: Inhale 2 puffs   topiramate (TOPAMAX) 50 MG tablet  Self Yes No   Sig: Take 50 mg by mouth      Facility-Administered Medications: None       Past Medical History:   Diagnosis Date    Anemia     Asthma     Meningitis     Migraines        Past Surgical History:   Procedure Laterality Date    DILATION AND CURETTAGE OF UTERUS      SURGICALLY INDUCED  BY D&C    ECTOPIC PREGNANCY SURGERY Left     SALPINGECTOMY Left     RESOLVED 2010       Family History   Problem Relation Age of Onset    Breast cancer Mother     Diabetes Mother     Hypertension Mother     Osteoporosis Mother     Hypertension Father     Asthma Brother     Breast cancer Maternal Grandmother     Leukemia Maternal Grandmother     Hypertension Maternal Grandmother     Hypertension Paternal Grandmother     Heart disease Paternal Grandmother     Alzheimer's disease Paternal Grandmother     Dementia Paternal Grandmother     No Known Problems Paternal Grandfather     Breast cancer Maternal Aunt     Hypertension Family     Leukemia Daughter      I have reviewed and agree with the history as documented.    E-Cigarette/Vaping    E-Cigarette Use Never User      E-Cigarette/Vaping Substances     Social History     Tobacco Use    Smoking status: Never    Smokeless tobacco: Never   Vaping Use    Vaping status: Never Used   Substance Use Topics    Alcohol use: Yes     Comment: socially    Drug use: No       Review of Systems   Constitutional:  Negative for appetite change, chills, fatigue and fever.   HENT:  Negative for sneezing and sore throat.    Eyes:  Negative for visual disturbance.   Respiratory:  Negative for cough, choking, chest tightness, shortness of breath and wheezing.    Cardiovascular:  Positive for  chest pain. Negative for palpitations.   Gastrointestinal:  Negative for abdominal pain, constipation, diarrhea, nausea and vomiting.   Genitourinary:  Negative for difficulty urinating and dysuria.   Neurological:  Negative for dizziness, weakness, light-headedness, numbness and headaches.   All other systems reviewed and are negative.      Physical Exam  Physical Exam  Vitals and nursing note reviewed.   Constitutional:       General: She is not in acute distress.     Appearance: She is well-developed. She is not diaphoretic.   HENT:      Head: Normocephalic and atraumatic.   Eyes:      Pupils: Pupils are equal, round, and reactive to light.   Neck:      Vascular: No JVD.      Trachea: No tracheal deviation.   Cardiovascular:      Rate and Rhythm: Normal rate and regular rhythm.      Heart sounds: Normal heart sounds. No murmur heard.     No friction rub. No gallop.   Pulmonary:      Effort: Pulmonary effort is normal. No respiratory distress.      Breath sounds: Normal breath sounds. No wheezing or rales.   Abdominal:      General: Bowel sounds are normal. There is no distension.      Palpations: Abdomen is soft.      Tenderness: There is no abdominal tenderness. There is no guarding or rebound.   Skin:     General: Skin is warm and dry.      Coloration: Skin is not pale.   Neurological:      Mental Status: She is alert and oriented to person, place, and time.      Cranial Nerves: No cranial nerve deficit.      Motor: No abnormal muscle tone.   Psychiatric:         Behavior: Behavior normal.         Vital Signs  ED Triage Vitals   Temperature Pulse Respirations Blood Pressure SpO2   07/31/24 0039 07/31/24 0039 07/31/24 0039 07/31/24 0039 07/31/24 0039   98 °F (36.7 °C) 96 18 131/92 100 %      Temp Source Heart Rate Source Patient Position - Orthostatic VS BP Location FiO2 (%)   07/31/24 0039 07/31/24 0039 07/31/24 0039 07/31/24 0150 --   Temporal Monitor Sitting Right arm       Pain Score       07/31/24 0148        5           Vitals:    07/31/24 0150 07/31/24 0200 07/31/24 0300 07/31/24 0400   BP: 122/82 118/76 124/79 138/74   Pulse: 87 82 69 73   Patient Position - Orthostatic VS: Lying            Visual Acuity      ED Medications  Medications   ketorolac (TORADOL) injection 15 mg (15 mg Intravenous Given 7/31/24 0148)       Diagnostic Studies  Results Reviewed       Procedure Component Value Units Date/Time    HS Troponin I 2hr [[id number]] Collected: 07/31/24 0303    Lab Status: Final result Specimen: Blood from Arm, Right Updated: 07/31/24 0331     hs TnI 2hr <2 ng/L      Delta 2hr hsTnI --    HS Troponin 0hr (reflex protocol) [[id number]]  (Normal) Collected: 07/31/24 0053    Lab Status: Final result Specimen: Blood from Arm, Right Updated: 07/31/24 0119     hs TnI 0hr <2 ng/L     Comprehensive metabolic panel [[id number]]  (Abnormal) Collected: 07/31/24 0053    Lab Status: Final result Specimen: Blood from Arm, Right Updated: 07/31/24 0112     Sodium 138 mmol/L      Potassium 3.5 mmol/L      Chloride 104 mmol/L      CO2 26 mmol/L      ANION GAP 8 mmol/L      BUN 10 mg/dL      Creatinine 1.06 mg/dL      Glucose 93 mg/dL      Calcium 9.8 mg/dL      AST 15 U/L      ALT 12 U/L      Alkaline Phosphatase 24 U/L      Total Protein 7.3 g/dL      Albumin 4.4 g/dL      Total Bilirubin 0.37 mg/dL      eGFR 64 ml/min/1.73sq m     Narrative:      National Kidney Disease Foundation guidelines for Chronic Kidney Disease (CKD):     Stage 1 with normal or high GFR (GFR > 90 mL/min/1.73 square meters)    Stage 2 Mild CKD (GFR = 60-89 mL/min/1.73 square meters)    Stage 3A Moderate CKD (GFR = 45-59 mL/min/1.73 square meters)    Stage 3B Moderate CKD (GFR = 30-44 mL/min/1.73 square meters)    Stage 4 Severe CKD (GFR = 15-29 mL/min/1.73 square meters)    Stage 5 End Stage CKD (GFR <15 mL/min/1.73 square meters)  Note: GFR calculation is accurate only with a steady state creatinine    CBC and differential [[id number]]  (Abnormal) Collected:  07/31/24 0053    Lab Status: Final result Specimen: Blood from Arm, Right Updated: 07/31/24 0058     WBC 7.46 Thousand/uL      RBC 4.22 Million/uL      Hemoglobin 12.5 g/dL      Hematocrit 36.3 %      MCV 86 fL      MCH 29.6 pg      MCHC 34.4 g/dL      RDW 13.2 %      MPV 10.6 fL      Platelets 198 Thousands/uL      nRBC 0 /100 WBCs      Segmented % 36 %      Immature Grans % 0 %      Lymphocytes % 55 %      Monocytes % 4 %      Eosinophils Relative 4 %      Basophils Relative 1 %      Absolute Neutrophils 2.69 Thousands/µL      Absolute Immature Grans 0.02 Thousand/uL      Absolute Lymphocytes 4.10 Thousands/µL      Absolute Monocytes 0.30 Thousand/µL      Eosinophils Absolute 0.30 Thousand/µL      Basophils Absolute 0.05 Thousands/µL                    XR chest 1 view portable   Final Result by Shyam Deluna MD (07/31 0726)      No acute cardiopulmonary disease.            Workstation performed: [id number]                    Procedures  Procedures         ED Course               HEART Risk Score      Flowsheet Row Most Recent Value   Heart Score Risk Calculator    History 0 Filed at: 07/31/2024 0349   ECG 0 Filed at: 07/31/2024 0349   Age 0 Filed at: 07/31/2024 0349   Risk Factors 1 Filed at: 07/31/2024 0349   Troponin 0 Filed at: 07/31/2024 0349   HEART Score 1 Filed at: 07/31/2024 0349                          SBIRT 20yo+      Flowsheet Row Most Recent Value   Initial Alcohol Screen: US AUDIT-C     1. How often do you have a drink containing alcohol? 0 Filed at: 07/31/2024 0044   2. How many drinks containing alcohol do you have on a typical day you are drinking?  0 Filed at: 07/31/2024 0044   3b. FEMALE Any Age, or MALE 65+: How often do you have 4 or more drinks on one occassion? 0 Filed at: 07/31/2024 0044   Audit-C Score 0 Filed at: 07/31/2024 0044   HOA: How many times in the past year have you...    Used an illegal drug or used a prescription medication for non-medical reasons? Never Filed at:  07/31/2024 0044                      Medical Decision Making  41-year-old female with chest pain will check labs EKG serial troponins monitor and reassess.    Amount and/or Complexity of Data Reviewed  Labs: ordered.  Radiology: ordered.    Risk  Prescription drug management.                 Disposition  Final diagnoses:   Chest pain     Time reflects when diagnosis was documented in both MDM as applicable and the Disposition within this note       Time User Action Codes Description Comment    7/31/2024  3:44 AM Cash Richards [R07.9] Chest pain           ED Disposition       ED Disposition   Discharge    Condition   Stable    Date/Time   Wed Jul 31, 2024 0344    Comment   Maximo Livingston discharge to home/self care.                   Follow-up Information       Follow up With Specialties Details Why Contact Info    Liz Arango MD Family Medicine   [address] [place] [id number]  [phone]              Discharge Medication List as of 7/31/2024  3:52 AM        CONTINUE these medications which have NOT CHANGED    Details   albuterol (2.5 mg/3 mL) 0.083 % nebulizer solution every 6 (six) hours as needed , Starting Tue 7/18/2017, Historical Med      !! albuterol (ProAir HFA) 90 mcg/act inhaler Inhale 2 puffs every 6 (six) hours as needed for wheezing, Starting Wed 11/30/2022, Normal      !! albuterol (PROVENTIL HFA,VENTOLIN HFA) 90 mcg/act inhaler Inhale 2 puffs every 4 (four) hours as needed for wheezing, Starting Fri 6/8/2018, Print      budesonide-formoterol (SYMBICORT) 160-4.5 mcg/act inhaler Inhale 2 puffs, Starting Thu 10/22/2020, Historical Med      cetirizine (ZYRTEC ALLERGY) 10 mg tablet Take 10 mg by mouth daily, Historical Med      cyclobenzaprine (FLEXERIL) 10 mg tablet Take 1 tablet (10 mg total) by mouth 3 (three) times a day as needed for muscle spasms, Starting Sun 7/10/2022, Print      ipratropium (ATROVENT) 0.02 % nebulizer solution Take 1 vial (0.5 mg total) by nebulization 4  (four) times a day, Starting Sat 1/12/2019, Normal      methocarbamol (ROBAXIN) 500 mg tablet Take 1 tablet (500 mg total) by mouth 2 (two) times a day, Starting Wed 5/8/2024, Normal      montelukast (SINGULAIR) 10 mg tablet Take 10 mg by mouth daily at bedtime, Historical Med      naproxen (NAPROSYN) 500 mg tablet Take 1 tablet (500 mg total) by mouth 2 (two) times a day with meals As needed for pain in foot, Starting Tue 7/27/2021, Normal      phentermine 37.5 MG capsule Take 37.5 mg by mouth, Starting Thu 10/22/2020, Historical Med      !! predniSONE 20 mg tablet Take 2 tablets (40 mg total) by mouth daily, Starting Sat 1/12/2019, Normal      !! predniSONE 20 mg tablet Take 2 tablets (40 mg total) by mouth daily, Starting Sun 7/10/2022, Print      SUMAtriptan (IMITREX) 25 mg tablet Take 2 tablets at onset of migraine and one tablet every 2 hours as needed, not more than 5 tablets in 24 hours, Historical Med      tiotropium (Spiriva Respimat) 1.25 MCG/ACT AERS inhaler Inhale 2 puffs, Starting Thu 10/22/2020, Historical Med      topiramate (TOPAMAX) 50 MG tablet Take 50 mg by mouth, Starting Thu 10/22/2020, Historical Med       !! - Potential duplicate medications found. Please discuss with provider.          No discharge procedures on file.    PDMP Review       None            ED Provider  Electronically Signed by             Cash Richards MD  08/04/24 1306

## 2024-12-29 ENCOUNTER — APPOINTMENT (EMERGENCY)
Dept: RADIOLOGY | Facility: HOSPITAL | Age: 44
End: 2024-12-29
Payer: COMMERCIAL

## 2024-12-29 ENCOUNTER — HOSPITAL ENCOUNTER (EMERGENCY)
Facility: HOSPITAL | Age: 44
Discharge: HOME | End: 2024-12-29
Attending: EMERGENCY MEDICINE
Payer: COMMERCIAL

## 2024-12-29 VITALS
OXYGEN SATURATION: 98 % | RESPIRATION RATE: 18 BRPM | TEMPERATURE: 98.1 F | HEART RATE: 110 BPM | SYSTOLIC BLOOD PRESSURE: 117 MMHG | DIASTOLIC BLOOD PRESSURE: 84 MMHG

## 2024-12-29 DIAGNOSIS — S92.351A CLOSED FRACTURE OF BASE OF FIFTH METATARSAL BONE OF RIGHT FOOT: Primary | ICD-10-CM

## 2024-12-29 PROCEDURE — 99283 EMERGENCY DEPT VISIT LOW MDM: CPT

## 2024-12-29 PROCEDURE — 73610 X-RAY EXAM OF ANKLE: CPT

## 2024-12-29 PROCEDURE — 73630 X-RAY EXAM OF FOOT: CPT

## 2024-12-29 PROCEDURE — 99284 EMERGENCY DEPT VISIT MOD MDM: CPT

## 2024-12-29 RX ORDER — IBUPROFEN 600 MG/1
600 TABLET, FILM COATED ORAL ONCE
Status: COMPLETED | OUTPATIENT
Start: 2024-12-29 | End: 2024-12-29

## 2024-12-29 RX ORDER — ACETAMINOPHEN 325 MG/1
975 TABLET ORAL ONCE
Status: COMPLETED | OUTPATIENT
Start: 2024-12-29 | End: 2024-12-29

## 2024-12-29 RX ORDER — OXYCODONE HYDROCHLORIDE 5 MG/1
5 TABLET ORAL EVERY 6 HOURS PRN
Qty: 10 TABLET | Refills: 0 | Status: SHIPPED | OUTPATIENT
Start: 2024-12-29 | End: 2025-01-05

## 2024-12-29 RX ORDER — OXYCODONE HYDROCHLORIDE 5 MG/1
5 TABLET ORAL ONCE
Refills: 0 | Status: COMPLETED | OUTPATIENT
Start: 2024-12-29 | End: 2024-12-29

## 2024-12-29 RX ORDER — IBUPROFEN 600 MG/1
600 TABLET, FILM COATED ORAL EVERY 6 HOURS PRN
Qty: 30 TABLET | Refills: 0 | Status: SHIPPED | OUTPATIENT
Start: 2024-12-29

## 2024-12-29 RX ORDER — ACETAMINOPHEN 500 MG
1000 TABLET ORAL EVERY 8 HOURS
Qty: 40 TABLET | Refills: 0 | Status: SHIPPED | OUTPATIENT
Start: 2024-12-29

## 2024-12-29 RX ADMIN — OXYCODONE HYDROCHLORIDE 5 MG: 5 TABLET ORAL at 23:39

## 2024-12-29 RX ADMIN — ACETAMINOPHEN 975 MG: 325 TABLET, FILM COATED ORAL at 22:56

## 2024-12-29 RX ADMIN — IBUPROFEN 600 MG: 600 TABLET, FILM COATED ORAL at 22:57

## 2024-12-29 RX ADMIN — OXYCODONE HYDROCHLORIDE 5 MG: 5 TABLET ORAL at 22:56

## 2024-12-30 NOTE — ED PROVIDER NOTES
Time reflects when diagnosis was documented in both MDM as applicable and the Disposition within this note       Time User Action Codes Description Comment    12/29/2024 11:16 PM Alverto Varela Add [S92.351A] Closed fracture of base of fifth metatarsal bone of right foot           ED Disposition       ED Disposition   Discharge    Condition   Stable    Date/Time   Sun Dec 29, 2024 11:16 PM    Comment   Maximo Livingston discharge to home/self care.                   Assessment & Plan       Medical Decision Making  44-year-old female presents to ED for evaluation of right foot pain as seen in HPI.  On physical examination patient has tenderness to palpation of right foot most focally along the fifth metatarsal and dorsal aspect of foot.  2+ DP and PT pulses.  Brisk capillary refill.  Decreased range of motion due to pain.  Able to move toes.  No open wounds seen.  Obtained x-ray of right foot and ankle.  X-ray returned showing zone one 5th metatarsal fracture without displacement.  No other fracture seen.  Patient provided with acute pain control with Tylenol, ibuprofen, oxycodone.  Provided patient with a cam walking boot as well as crutches.  Referral to orthopedics provided.  Advised to use crutches until able to put light pressure on right foot while wearing walking boot.  Follow-up with orthopedics for further evaluation.  Prescription for Tylenol, ibuprofen, oxycodone sent to patient's pharmacy.  Ice, elevation to reduce inflammation.  Return to ED for new or worsening symptoms.  Patient agreeable to plan.  Patient discharged.     Prior to discharge, discharge instructions were discussed with patient at bedside. Patient was provided both verbal and written instructions. Patient is understanding of the discharge instructions and is agreeable to plan of care. Return precautions were discussed with patient bedside, patient verbalized understanding of signs and symptoms that would necessitate return to the ED. All  "questions were answered. Patient was comfortable with the plan of care and discharged to home.    Portions of this chart may have been written with voice recognition software.  Occasional grammatical errors, wrong word or \"sound a like\" substitutions may have occurred due to software limitations.  Please read carefully and use context to recognize where substitutions have occurred.     Amount and/or Complexity of Data Reviewed  Radiology: ordered and independent interpretation performed.    Risk  OTC drugs.  Prescription drug management.             Medications   acetaminophen (TYLENOL) tablet 975 mg (975 mg Oral Given 12/29/24 2256)   ibuprofen (MOTRIN) tablet 600 mg (600 mg Oral Given 12/29/24 2257)   oxyCODONE (ROXICODONE) IR tablet 5 mg (5 mg Oral Given 12/29/24 2256)   oxyCODONE (ROXICODONE) IR tablet 5 mg (5 mg Oral Given 12/29/24 2339)       ED Risk Strat Scores                          SBIRT 20yo+      Flowsheet Row Most Recent Value   Initial Alcohol Screen: US AUDIT-C     1. How often do you have a drink containing alcohol? 0 Filed at: 12/29/2024 2147   2. How many drinks containing alcohol do you have on a typical day you are drinking?  0 Filed at: 12/29/2024 2147   3a. Male UNDER 65: How often do you have five or more drinks on one occasion? 0 Filed at: 12/29/2024 2147   3b. FEMALE Any Age, or MALE 65+: How often do you have 4 or more drinks on one occassion? 0 Filed at: 12/29/2024 2147   Audit-C Score 0 Filed at: 12/29/2024 2147   HOA: How many times in the past year have you...    Used an illegal drug or used a prescription medication for non-medical reasons? Never Filed at: 12/29/2024 2147                            History of Present Illness       Chief Complaint   Patient presents with    Foot Injury     Pt reports going down the stairs to put on coat, pt report coming down on right foot wrong from the stairs and hearing a pop in her right root. Pt reports not being able to walk on it at all after. " Pt with +right pedal pulses, able to move toes and ankle reports pain.Pt denies any other injury.       Past Medical History:   Diagnosis Date    Anemia     Asthma     Meningitis     Migraines       Past Surgical History:   Procedure Laterality Date    DILATION AND CURETTAGE OF UTERUS      SURGICALLY INDUCED  BY D&C    ECTOPIC PREGNANCY SURGERY Left     SALPINGECTOMY Left     RESOLVED 2010      Family History   Problem Relation Age of Onset    Breast cancer Mother     Diabetes Mother     Hypertension Mother     Osteoporosis Mother     Hypertension Father     Asthma Brother     Breast cancer Maternal Grandmother     Leukemia Maternal Grandmother     Hypertension Maternal Grandmother     Hypertension Paternal Grandmother     Heart disease Paternal Grandmother     Alzheimer's disease Paternal Grandmother     Dementia Paternal Grandmother     No Known Problems Paternal Grandfather     Breast cancer Maternal Aunt     Hypertension Family     Leukemia Daughter       Social History     Tobacco Use    Smoking status: Never    Smokeless tobacco: Never   Vaping Use    Vaping status: Never Used   Substance Use Topics    Alcohol use: Yes     Comment: socially    Drug use: No      E-Cigarette/Vaping    E-Cigarette Use Never User       E-Cigarette/Vaping Substances      I have reviewed and agree with the history as documented.     44-year-old female presents to ED for evaluation of right foot injury.  Patient states that this evening she was walking down stairs at her home.  She came down wrong on her right foot.  She felt a pop in her foot.  Subsequently developed pain.  Unable to put weight on right foot.  Generalized foot pain.  Able to wiggle toes.  Denies numbness of her right foot.  No pain medication prior to arrival.  No other injuries to report.        Review of Systems   Musculoskeletal:         Positive right foot   All other systems reviewed and are negative.          Objective       ED Triage Vitals [24  2143]   Temperature Pulse Blood Pressure Respirations SpO2 Patient Position - Orthostatic VS   98.1 °F (36.7 °C) (!) 110 117/84 18 98 % Sitting      Temp Source Heart Rate Source BP Location FiO2 (%) Pain Score    Oral Monitor Left arm -- 8      Vitals      Date and Time Temp Pulse SpO2 Resp BP Pain Score FACES Pain Rating User   12/29/24 2339 -- -- -- -- -- 9 -- DERICK   12/29/24 2256 -- -- -- -- -- 9 -- DERICK   12/29/24 2143 98.1 °F (36.7 °C) 110 98 % 18 117/84 8 -- ELIECER            Physical Exam  Vitals and nursing note reviewed.   Constitutional:       General: She is in acute distress.      Appearance: She is well-developed. She is not toxic-appearing or diaphoretic.   HENT:      Head: Normocephalic and atraumatic.   Eyes:      Conjunctiva/sclera: Conjunctivae normal.   Cardiovascular:      Rate and Rhythm: Normal rate and regular rhythm.      Heart sounds: No murmur heard.  Pulmonary:      Effort: Pulmonary effort is normal. No respiratory distress.      Breath sounds: Normal breath sounds.   Musculoskeletal:      Cervical back: Neck supple.      Right foot: Decreased range of motion. Normal capillary refill. Tenderness and bony tenderness present. No swelling, deformity or laceration. Normal pulse.      Left foot: Normal.        Feet:    Skin:     General: Skin is warm and dry.      Capillary Refill: Capillary refill takes less than 2 seconds.   Neurological:      Mental Status: She is alert.   Psychiatric:         Mood and Affect: Mood normal.         Results Reviewed       None            XR foot 3+ views RIGHT   ED Interpretation by Alverto Varela PA-C (12/29 2332)   Zone 1 5th metatarsal fracture without displacement      XR ankle 3+ views RIGHT    (Results Pending)       Procedures    ED Medication and Procedure Management   Prior to Admission Medications   Prescriptions Last Dose Informant Patient Reported? Taking?   SUMAtriptan (IMITREX) 25 mg tablet  Self Yes No   Sig: Take 2 tablets at onset of  migraine and one tablet every 2 hours as needed, not more than 5 tablets in 24 hours   albuterol (2.5 mg/3 mL) 0.083 % nebulizer solution  Self Yes No   Sig: every 6 (six) hours as needed    albuterol (PROVENTIL HFA,VENTOLIN HFA) 90 mcg/act inhaler  Self No No   Sig: Inhale 2 puffs every 4 (four) hours as needed for wheezing   albuterol (ProAir HFA) 90 mcg/act inhaler   No No   Sig: Inhale 2 puffs every 6 (six) hours as needed for wheezing   budesonide-formoterol (SYMBICORT) 160-4.5 mcg/act inhaler  Self Yes No   Sig: Inhale 2 puffs   cetirizine (ZYRTEC ALLERGY) 10 mg tablet  Self Yes No   Sig: Take 10 mg by mouth daily   cyclobenzaprine (FLEXERIL) 10 mg tablet   No No   Sig: Take 1 tablet (10 mg total) by mouth 3 (three) times a day as needed for muscle spasms   ibuprofen (MOTRIN) 800 mg tablet   No No   Sig: Take 1 tablet (800 mg total) by mouth 3 (three) times a day   ipratropium (ATROVENT) 0.02 % nebulizer solution  Self No No   Sig: Take 1 vial (0.5 mg total) by nebulization 4 (four) times a day   methocarbamol (ROBAXIN) 500 mg tablet   No No   Sig: Take 1 tablet (500 mg total) by mouth 2 (two) times a day   methocarbamol (ROBAXIN) 500 mg tablet   No No   Sig: Take 1 tablet (500 mg total) by mouth 2 (two) times a day   montelukast (SINGULAIR) 10 mg tablet  Self Yes No   Sig: Take 10 mg by mouth daily at bedtime   naproxen (NAPROSYN) 500 mg tablet   No No   Sig: Take 1 tablet (500 mg total) by mouth 2 (two) times a day with meals As needed for pain in foot   phentermine 37.5 MG capsule  Self Yes No   Sig: Take 37.5 mg by mouth   predniSONE 20 mg tablet  Self No No   Sig: Take 2 tablets (40 mg total) by mouth daily   predniSONE 20 mg tablet   No No   Sig: Take 2 tablets (40 mg total) by mouth daily   tiotropium (Spiriva Respimat) 1.25 MCG/ACT AERS inhaler  Self Yes No   Sig: Inhale 2 puffs   topiramate (TOPAMAX) 50 MG tablet  Self Yes No   Sig: Take 50 mg by mouth      Facility-Administered Medications: None      Discharge Medication List as of 12/29/2024 11:21 PM        START taking these medications    Details   acetaminophen (TYLENOL) 500 mg tablet Take 2 tablets (1,000 mg total) by mouth every 8 (eight) hours, Starting Sun 12/29/2024, Normal      !! ibuprofen (MOTRIN) 600 mg tablet Take 1 tablet (600 mg total) by mouth every 6 (six) hours as needed for mild pain, Starting Sun 12/29/2024, Normal      oxyCODONE (ROXICODONE) 5 immediate release tablet Take 1 tablet (5 mg total) by mouth every 6 (six) hours as needed for severe pain for up to 7 days Max Daily Amount: 20 mg, Starting Sun 12/29/2024, Until Sun 1/5/2025 at 2359, Normal       !! - Potential duplicate medications found. Please discuss with provider.        CONTINUE these medications which have NOT CHANGED    Details   albuterol (2.5 mg/3 mL) 0.083 % nebulizer solution every 6 (six) hours as needed , Starting Tue 7/18/2017, Historical Med      !! albuterol (ProAir HFA) 90 mcg/act inhaler Inhale 2 puffs every 6 (six) hours as needed for wheezing, Starting Wed 11/30/2022, Normal      !! albuterol (PROVENTIL HFA,VENTOLIN HFA) 90 mcg/act inhaler Inhale 2 puffs every 4 (four) hours as needed for wheezing, Starting Fri 6/8/2018, Print      budesonide-formoterol (SYMBICORT) 160-4.5 mcg/act inhaler Inhale 2 puffs, Starting Thu 10/22/2020, Historical Med      cetirizine (ZYRTEC ALLERGY) 10 mg tablet Take 10 mg by mouth daily, Historical Med      cyclobenzaprine (FLEXERIL) 10 mg tablet Take 1 tablet (10 mg total) by mouth 3 (three) times a day as needed for muscle spasms, Starting Sun 7/10/2022, Print      !! ibuprofen (MOTRIN) 800 mg tablet Take 1 tablet (800 mg total) by mouth 3 (three) times a day, Starting Wed 7/31/2024, Normal      ipratropium (ATROVENT) 0.02 % nebulizer solution Take 1 vial (0.5 mg total) by nebulization 4 (four) times a day, Starting Sat 1/12/2019, Normal      !! methocarbamol (ROBAXIN) 500 mg tablet Take 1 tablet (500 mg total) by mouth 2 (two)  times a day, Starting Wed 5/8/2024, Normal      !! methocarbamol (ROBAXIN) 500 mg tablet Take 1 tablet (500 mg total) by mouth 2 (two) times a day, Starting Wed 7/31/2024, Normal      montelukast (SINGULAIR) 10 mg tablet Take 10 mg by mouth daily at bedtime, Historical Med      naproxen (NAPROSYN) 500 mg tablet Take 1 tablet (500 mg total) by mouth 2 (two) times a day with meals As needed for pain in foot, Starting Tue 7/27/2021, Normal      phentermine 37.5 MG capsule Take 37.5 mg by mouth, Starting Thu 10/22/2020, Historical Med      !! predniSONE 20 mg tablet Take 2 tablets (40 mg total) by mouth daily, Starting Sat 1/12/2019, Normal      !! predniSONE 20 mg tablet Take 2 tablets (40 mg total) by mouth daily, Starting Sun 7/10/2022, Print      SUMAtriptan (IMITREX) 25 mg tablet Take 2 tablets at onset of migraine and one tablet every 2 hours as needed, not more than 5 tablets in 24 hours, Historical Med      tiotropium (Spiriva Respimat) 1.25 MCG/ACT AERS inhaler Inhale 2 puffs, Starting Thu 10/22/2020, Historical Med      topiramate (TOPAMAX) 50 MG tablet Take 50 mg by mouth, Starting Thu 10/22/2020, Historical Med       !! - Potential duplicate medications found. Please discuss with provider.          ED SEPSIS DOCUMENTATION   Time reflects when diagnosis was documented in both MDM as applicable and the Disposition within this note       Time User Action Codes Description Comment    12/29/2024 11:16 PM Alverto Varela Add [S92.351A] Closed fracture of base of fifth metatarsal bone of right foot                  Alverto Varela PA-C  12/30/24 0742

## 2024-12-30 NOTE — DISCHARGE INSTRUCTIONS
Today I provided prescription for Tylenol, ibuprofen, oxycodone.  Use the Tylenol and ibuprofen first for pain control.  If further pain control needed consider using one of the oxycodone tablets.  Take oxycodone cautiously as it is a addictive substance.  Wear the walking boot with activity.  Use the crutches until able to put weight on right foot with the walking boot.  Follow-up with orthopedics for further evaluation of fracture.  Please do return to ED for new or worsening symptoms.

## 2025-01-07 ENCOUNTER — OFFICE VISIT (OUTPATIENT)
Dept: OBGYN CLINIC | Facility: CLINIC | Age: 45
End: 2025-01-07
Payer: COMMERCIAL

## 2025-01-07 VITALS — RESPIRATION RATE: 18 BRPM | BODY MASS INDEX: 35.67 KG/M2 | HEIGHT: 62 IN

## 2025-01-07 DIAGNOSIS — S92.351A CLOSED FRACTURE OF BASE OF FIFTH METATARSAL BONE OF RIGHT FOOT: ICD-10-CM

## 2025-01-07 PROCEDURE — 99203 OFFICE O/P NEW LOW 30 MIN: CPT | Performed by: STUDENT IN AN ORGANIZED HEALTH CARE EDUCATION/TRAINING PROGRAM

## 2025-01-07 NOTE — PROGRESS NOTES
ASSESSMENT/PLAN:    Diagnoses and all orders for this visit:    Closed fracture of base of fifth metatarsal bone of right foot  -     Ambulatory Referral to Orthopedic Surgery    Discussed history, exam, and imaging with patient. Presentation most consistent with incomplete 5th metatarsal base fracture and we will plan for nonoperative management at this time.  Discussed oral/topical medication regimen. Will plan for continued use of over-the-counter medications as needed.  Discussed performance of a home exercise program including motion of her ankle to prevent stiffness.    Discussed the importance of a balanced diet as well as vitamin D and calcium supplementation.  Follow-up with me in 5 weeks for repeat x-ray to ensure signs of healing and to allow for progression out of cam boot at that time.  She is aware that if she feels a sharp increase in her pain or she feels like she is unable to weight-bear she may call for earlier appointment and we will take repeat imaging to ensure that there is been no completion of her fracture and can also consider CT if needed for evaluation.  _____________________________________________________  CHIEF COMPLAINT:  Chief Complaint   Patient presents with    Right Foot - Pain, Numbness, Tingling, Swelling     DOI 12/29/24        SUBJECTIVE:  Maximo Livingston is a 44 y.o. year old female who presents for evaluation of right foot injury. The issue began December 29 when she slipped down the stairs and felt a crack to the lateral aspect of her foot.  She had difficulty weightbearing and went to the emergency department for evaluation.  She was there told that she had 1/5 metatarsal base fracture, she was provided with a cam boot and told that she may progress to weightbearing as tolerated when she felt comfortable.  She presents today for evaluation.  Since that time she was initially nonweightbearing with crutches for assistance, but she does start to walk yesterday and feels a  lot more comfortable.  No numbness or tingling to the foot.  No fragility fractures in the past.  She does currently supplement with vitamin D.    PAST MEDICAL HISTORY:  Past Medical History:   Diagnosis Date    Anemia     Asthma     Meningitis     Migraines        PAST SURGICAL HISTORY:  Past Surgical History:   Procedure Laterality Date    DILATION AND CURETTAGE OF UTERUS      SURGICALLY INDUCED  BY [facility]&C    ECTOPIC PREGNANCY SURGERY Left     SALPINGECTOMY Left     RESOLVED        FAMILY HISTORY:  Family History   Problem Relation Age of Onset    Breast cancer Mother     Diabetes Mother     Hypertension Mother     Osteoporosis Mother     Hypertension Father     Asthma Brother     Breast cancer Maternal Grandmother     Leukemia Maternal Grandmother     Hypertension Maternal Grandmother     Hypertension Paternal Grandmother     Heart disease Paternal Grandmother     Alzheimer's disease Paternal Grandmother     Dementia Paternal Grandmother     No Known Problems Paternal Grandfather     Breast cancer Maternal Aunt     Hypertension Family     Leukemia Daughter        SOCIAL HISTORY:  Social History     Tobacco Use    Smoking status: Never    Smokeless tobacco: Never   Vaping Use    Vaping status: Never Used   Substance Use Topics    Alcohol use: Yes     Comment: socially    Drug use: No       MEDICATIONS:    Current Outpatient Medications:     acetaminophen (TYLENOL) 500 mg tablet, Take 2 tablets (1,000 mg total) by mouth every 8 (eight) hours, Disp: 40 tablet, Rfl: 0    albuterol (2.5 mg/3 mL) 0.083 % nebulizer solution, every 6 (six) hours as needed , Disp: , Rfl:     albuterol (ProAir HFA) 90 mcg/act inhaler, Inhale 2 puffs every 6 (six) hours as needed for wheezing, Disp: 8.5 g, Rfl: 0    budesonide-formoterol (SYMBICORT) 160-4.5 mcg/act inhaler, Inhale 2 puffs, Disp: , Rfl:     cetirizine (ZYRTEC ALLERGY) 10 mg tablet, Take 10 mg by mouth daily, Disp: , Rfl:     Dupilumab 300 MG/2ML SOAJ, Inject under  the skin, Disp: , Rfl:     ibuprofen (MOTRIN) 600 mg tablet, Take 1 tablet (600 mg total) by mouth every 6 (six) hours as needed for mild pain, Disp: 30 tablet, Rfl: 0    ipratropium (ATROVENT) 0.02 % nebulizer solution, Take 1 vial (0.5 mg total) by nebulization 4 (four) times a day, Disp: 20 vial, Rfl: 0    montelukast (SINGULAIR) 10 mg tablet, Take 10 mg by mouth daily at bedtime, Disp: , Rfl:     phentermine 37.5 MG capsule, Take 37.5 mg by mouth, Disp: , Rfl:     albuterol (PROVENTIL HFA,VENTOLIN HFA) 90 mcg/act inhaler, Inhale 2 puffs every 4 (four) hours as needed for wheezing, Disp: 1 Inhaler, Rfl: 11    cyclobenzaprine (FLEXERIL) 10 mg tablet, Take 1 tablet (10 mg total) by mouth 3 (three) times a day as needed for muscle spasms (Patient not taking: Reported on 1/7/2025), Disp: 20 tablet, Rfl: 0    ibuprofen (MOTRIN) 800 mg tablet, Take 1 tablet (800 mg total) by mouth 3 (three) times a day (Patient not taking: Reported on 1/7/2025), Disp: 21 tablet, Rfl: 0    methocarbamol (ROBAXIN) 500 mg tablet, Take 1 tablet (500 mg total) by mouth 2 (two) times a day, Disp: 20 tablet, Rfl: 0    methocarbamol (ROBAXIN) 500 mg tablet, Take 1 tablet (500 mg total) by mouth 2 (two) times a day (Patient not taking: Reported on 1/7/2025), Disp: 20 tablet, Rfl: 0    naproxen (NAPROSYN) 500 mg tablet, Take 1 tablet (500 mg total) by mouth 2 (two) times a day with meals As needed for pain in foot, Disp: 30 tablet, Rfl: 0    predniSONE 20 mg tablet, Take 2 tablets (40 mg total) by mouth daily, Disp: 8 tablet, Rfl: 0    predniSONE 20 mg tablet, Take 2 tablets (40 mg total) by mouth daily, Disp: 10 tablet, Rfl: 0    SUMAtriptan (IMITREX) 25 mg tablet, Take 2 tablets at onset of migraine and one tablet every 2 hours as needed, not more than 5 tablets in 24 hours (Patient not taking: Reported on 1/7/2025), Disp: , Rfl:     tiotropium (Spiriva Respimat) 1.25 MCG/ACT AERS inhaler, Inhale 2 puffs (Patient not taking: Reported on  "1/7/2025), Disp: , Rfl:     topiramate (TOPAMAX) 50 MG tablet, Take 50 mg by mouth (Patient not taking: Reported on 1/7/2025), Disp: , Rfl:     ALLERGIES:  Allergies   Allergen Reactions    Cat Hair Extract     Dust Mite Extract     Pollen Extract Nasal Congestion     Seasonal allergies trigger asthma    Shellfish-Derived Products - Food Allergy Anaphylaxis    Shellfish-Derived Products - Food Allergy        Review of systems:   Constitutional: Negative for fatigue, fever or loss of apetite.   HENT: Negative.    Respiratory: Negative for shortness of breath, dyspnea.    Cardiovascular: Negative for chest pain/tightness.   Gastrointestinal: Negative for abdominal pain, N/V.   Endocrine: Negative for cold/heat intolerance, unexplained weight loss/gain.   Genitourinary: Negative for flank pain, dysuria, hematuria.   Musculoskeletal: As in HPI   Skin: Negative for rash.    Neurological: Negative for numbness tingling  Psychiatric/Behavioral: Negative for agitation.  _____________________________________________________  PHYSICAL EXAMINATION:    Resp. rate 18, height 5' 2\" (1.575 m), last menstrual period 11/28/2024, unknown if currently breastfeeding.    General: well developed and well nourished, alert, oriented times 3, and appears comfortable  HEENT: Benign, normocephalic, atraumatic  Cardiovascular: Regular rate    Pulmonary: No wheezing or stridor  Abdomen: Soft, Nontender  Skin: No masses, erythema, lacerations, fluctation, ulcerations  Neurovascular: as per [facility] exam below    MUSCULOSKELETAL EXAMINATION:    Right foot exam  No bruising or swelling.  Flatfoot deformity.  Passive dorsiflexion to neutral, passive plantarflexion 30 degrees  Tender to palpation over the fifth metatarsal base  Strength testing deferred given pain and known fracture  Sensation intact to light touch in saphenous, sural, superficial peroneal, deep peroneal, tibial  2+ DP pulse  _____________________________________________________  STUDIES " REVIEWED:  Images personally reviewed by me today     3 views of right foot including AP, oblique and lateral demonstrate there is an incomplete fracture of the lateral cortex of the fifth metatarsal base which extends towards the fourth and fifth articulation but not to the level of the medial cortex of the fifth metatarsal base.  No other acute injuries noted.    Roland Trevino MD

## 2025-01-09 ENCOUNTER — TELEPHONE (OUTPATIENT)
Dept: OBGYN CLINIC | Facility: HOSPITAL | Age: 45
End: 2025-01-09

## 2025-02-11 ENCOUNTER — APPOINTMENT (OUTPATIENT)
Dept: RADIOLOGY | Facility: CLINIC | Age: 45
End: 2025-02-11
Payer: COMMERCIAL

## 2025-02-11 ENCOUNTER — OFFICE VISIT (OUTPATIENT)
Dept: OBGYN CLINIC | Facility: CLINIC | Age: 45
End: 2025-02-11
Payer: COMMERCIAL

## 2025-02-11 VITALS — RESPIRATION RATE: 18 BRPM | HEIGHT: 62 IN | BODY MASS INDEX: 35.67 KG/M2

## 2025-02-11 DIAGNOSIS — S92.351A CLOSED FRACTURE OF BASE OF FIFTH METATARSAL BONE OF RIGHT FOOT: Primary | ICD-10-CM

## 2025-02-11 DIAGNOSIS — S92.351A CLOSED FRACTURE OF BASE OF FIFTH METATARSAL BONE OF RIGHT FOOT: ICD-10-CM

## 2025-02-11 PROCEDURE — 73630 X-RAY EXAM OF FOOT: CPT

## 2025-02-11 PROCEDURE — 99213 OFFICE O/P EST LOW 20 MIN: CPT | Performed by: STUDENT IN AN ORGANIZED HEALTH CARE EDUCATION/TRAINING PROGRAM

## 2025-02-11 NOTE — PROGRESS NOTES
ASSESSMENT/PLAN:    Diagnoses and all orders for this visit:    Closed fracture of base of fifth metatarsal bone of right foot  -     Ambulatory Referral to Orthopedic Surgery    Discussed history, exam, and imaging with patient. Presentation most consistent with incomplete 5th metatarsal base fracture and we will plan for CT scan to determine plan for continued non-operative care vs need for transition of plan to operative intervention.   Discussed oral/topical medication regimen. Will plan for continued use of over-the-counter medications as needed.  Discussed continued use of CAM boot for ambulation. Recommend continued performance of home exercise program including motion of her ankle to prevent stiffness.    Again discussed the importance of a balanced diet as well as vitamin D and calcium supplementation.  Follow-up with me after CT to discuss results and next steps in treatment.  _____________________________________________________  CHIEF COMPLAINT:  Chief Complaint   Patient presents with    Right Foot - Follow-up, Pain, Tingling      Patient feels a lump under the foot that causes pain with pressure        SUBJECTIVE:  Maximo Livingston is a 44 y.o. year old female who presents for follow-up evaluation of right foot injury.  DOI 12/29/2024.  Patient notes that overall pain has improved since her last visit, although it is still present when she attempts to weight-bear with her foot flat on the ground. She notes that at times she will walk with her foot inverted to avoid putting pressure throughout the foot.  She notes that swelling has improved significantly since her last visit.  She notes that she ambulates with a CAM boot when out of the house, but without at times while at home.  She notes that she works from home and has had no difficulty with her job.  She denies distal paresthesias.        PAST MEDICAL HISTORY:  Past Medical History:   Diagnosis Date    Anemia     Asthma     Meningitis      Migraines        PAST SURGICAL HISTORY:  Past Surgical History:   Procedure Laterality Date    DILATION AND CURETTAGE OF UTERUS      SURGICALLY INDUCED  BY D&C    ECTOPIC PREGNANCY SURGERY Left     SALPINGECTOMY Left     RESOLVED 2010       FAMILY HISTORY:  Family History   Problem Relation Age of Onset    Breast cancer Mother     Diabetes Mother     Hypertension Mother     Osteoporosis Mother     Hypertension Father     Asthma Brother     Breast cancer Maternal Grandmother     Leukemia Maternal Grandmother     Hypertension Maternal Grandmother     Hypertension Paternal Grandmother     Heart disease Paternal Grandmother     Alzheimer's disease Paternal Grandmother     Dementia Paternal Grandmother     No Known Problems Paternal Grandfather     Breast cancer Maternal Aunt     Hypertension Family     Leukemia Daughter        SOCIAL HISTORY:  Social History     Tobacco Use    Smoking status: Never    Smokeless tobacco: Never   Vaping Use    Vaping status: Never Used   Substance Use Topics    Alcohol use: Yes     Comment: socially    Drug use: No       MEDICATIONS:    Current Outpatient Medications:     acetaminophen (TYLENOL) 500 mg tablet, Take 2 tablets (1,000 mg total) by mouth every 8 (eight) hours, Disp: 40 tablet, Rfl: 0    albuterol (2.5 mg/3 mL) 0.083 % nebulizer solution, every 6 (six) hours as needed , Disp: , Rfl:     albuterol (ProAir HFA) 90 mcg/act inhaler, Inhale 2 puffs every 6 (six) hours as needed for wheezing, Disp: 8.5 g, Rfl: 0    budesonide-formoterol (SYMBICORT) 160-4.5 mcg/act inhaler, Inhale 2 puffs, Disp: , Rfl:     cetirizine (ZYRTEC ALLERGY) 10 mg tablet, Take 10 mg by mouth daily, Disp: , Rfl:     Dupilumab 300 MG/2ML SOAJ, Inject under the skin, Disp: , Rfl:     ibuprofen (MOTRIN) 600 mg tablet, Take 1 tablet (600 mg total) by mouth every 6 (six) hours as needed for mild pain, Disp: 30 tablet, Rfl: 0    ipratropium (ATROVENT) 0.02 % nebulizer solution, Take 1 vial (0.5 mg total) by  nebulization 4 (four) times a day (Patient taking differently: Take 0.5 mg by nebulization 4 (four) times a day), Disp: 20 vial, Rfl: 0    montelukast (SINGULAIR) 10 mg tablet, Take 10 mg by mouth daily at bedtime, Disp: , Rfl:     phentermine 37.5 MG capsule, Take 37.5 mg by mouth, Disp: , Rfl:     cyclobenzaprine (FLEXERIL) 10 mg tablet, Take 1 tablet (10 mg total) by mouth 3 (three) times a day as needed for muscle spasms (Patient not taking: Reported on 1/7/2025), Disp: 20 tablet, Rfl: 0    ibuprofen (MOTRIN) 800 mg tablet, Take 1 tablet (800 mg total) by mouth 3 (three) times a day (Patient not taking: Reported on 1/7/2025), Disp: 21 tablet, Rfl: 0    methocarbamol (ROBAXIN) 500 mg tablet, Take 1 tablet (500 mg total) by mouth 2 (two) times a day, Disp: 20 tablet, Rfl: 0    methocarbamol (ROBAXIN) 500 mg tablet, Take 1 tablet (500 mg total) by mouth 2 (two) times a day (Patient not taking: Reported on 1/7/2025), Disp: 20 tablet, Rfl: 0    naproxen (NAPROSYN) 500 mg tablet, Take 1 tablet (500 mg total) by mouth 2 (two) times a day with meals As needed for pain in foot, Disp: 30 tablet, Rfl: 0    predniSONE 20 mg tablet, Take 2 tablets (40 mg total) by mouth daily, Disp: 8 tablet, Rfl: 0    predniSONE 20 mg tablet, Take 2 tablets (40 mg total) by mouth daily, Disp: 10 tablet, Rfl: 0    SUMAtriptan (IMITREX) 25 mg tablet, Take 2 tablets at onset of migraine and one tablet every 2 hours as needed, not more than 5 tablets in 24 hours (Patient not taking: Reported on 1/7/2025), Disp: , Rfl:     tiotropium (Spiriva Respimat) 1.25 MCG/ACT AERS inhaler, Inhale 2 puffs (Patient not taking: Reported on 1/7/2025), Disp: , Rfl:     topiramate (TOPAMAX) 50 MG tablet, Take 50 mg by mouth (Patient not taking: Reported on 1/7/2025), Disp: , Rfl:     ALLERGIES:  Allergies   Allergen Reactions    Cat Dander     Dust Mite Extract     Pollen Extract Nasal Congestion     Seasonal allergies trigger asthma    Shellfish-Derived  "Products - Food Allergy Anaphylaxis    Shellfish-Derived Products - Food Allergy        Review of systems:   Constitutional: Negative for fatigue, fever or loss of apetite.   HENT: Negative.    Respiratory: Negative for shortness of breath, dyspnea.    Cardiovascular: Negative for chest pain/tightness.   Gastrointestinal: Negative for abdominal pain, N/V.   Endocrine: Negative for cold/heat intolerance, unexplained weight loss/gain.   Genitourinary: Negative for flank pain, dysuria, hematuria.   Musculoskeletal: As in HPI   Skin: Negative for rash.    Neurological: Negative for numbness tingling  Psychiatric/Behavioral: Negative for agitation.  _____________________________________________________  PHYSICAL EXAMINATION:    Resp. rate 18, height 5' 2\" (1.575 m), unknown if currently breastfeeding.    General: well developed and well nourished, alert, oriented times 3, and appears comfortable  HEENT: Benign, normocephalic, atraumatic  Cardiovascular: Regular rate    Pulmonary: No wheezing or stridor  Abdomen: Soft, Nontender  Skin: No masses, erythema, lacerations, fluctation, ulcerations  Neurovascular: as per [facility] exam below    MUSCULOSKELETAL EXAMINATION:    Right foot exam    No bruising or swelling.  Flatfoot deformity.  Mild tenderness to palpation over fifth metatarsal base  Passive dorsiflexion to 10 degrees  Passive plantarflexion to 35 degrees  Supple hindfoot motion  Sensation intact to light touch in saphenous, sural, superficial peroneal, deep peroneal, tibial  2+ DP pulse  _____________________________________________________  STUDIES REVIEWED:  Images personally reviewed by me today     Xrays of the right foot taken on 2/11/2025 demonstrate previously demonstrated incomplete fracture of the lateral cortex of the 5th metatarsal base with possible extension to the fourth and fifth articulation. No other acute fracture or dislocation is demonstrated      Scribe Attestation      I,:  Kiko Al PA-C am " acting as a scribe while in the presence of the attending physician.:       I,:  Roland Trevino MD personally performed the services described in this documentation    as scribed in my presence.:

## 2025-02-18 ENCOUNTER — HOSPITAL ENCOUNTER (OUTPATIENT)
Dept: CT IMAGING | Facility: CLINIC | Age: 45
Discharge: HOME | End: 2025-02-18
Payer: COMMERCIAL

## 2025-02-18 DIAGNOSIS — S92.351A CLOSED FRACTURE OF BASE OF FIFTH METATARSAL BONE OF RIGHT FOOT: ICD-10-CM

## 2025-02-18 PROCEDURE — 73700 CT LOWER EXTREMITY W/O DYE: CPT

## 2025-02-24 ENCOUNTER — OFFICE VISIT (OUTPATIENT)
Dept: OBGYN CLINIC | Facility: CLINIC | Age: 45
End: 2025-02-24
Payer: COMMERCIAL

## 2025-02-24 VITALS — BODY MASS INDEX: 35.88 KG/M2 | WEIGHT: 195 LBS | RESPIRATION RATE: 18 BRPM | HEIGHT: 62 IN

## 2025-02-24 DIAGNOSIS — S92.351A CLOSED FRACTURE OF BASE OF FIFTH METATARSAL BONE OF RIGHT FOOT: Primary | ICD-10-CM

## 2025-02-24 PROCEDURE — 99213 OFFICE O/P EST LOW 20 MIN: CPT | Performed by: STUDENT IN AN ORGANIZED HEALTH CARE EDUCATION/TRAINING PROGRAM

## 2025-02-24 NOTE — PROGRESS NOTES
ASSESSMENT/PLAN:    Diagnoses and all orders for this visit:    Closed fracture of base of fifth metatarsal bone of right foot  -     Ambulatory Referral to Orthopedic Surgery    Discussed history, exam, and imaging with patient. Presentation continues to be consistent with Zone 1 R 5th MT Base Fx.  Okay to progress out of boot and and gradually back to activities as tolerated  Discussed potential bony healing vs more likely fibrous union given appearance of CT.  Discussed that patients can often times be symptomatic up to 6 months after the injury.   F/u 3 months for repeat eval, XR 3v R foot, and last visit.   _____________________________________________________  CHIEF COMPLAINT:  Chief Complaint   Patient presents with    Right Foot - Follow-up, Results     CT scan 25       SUBJECTIVE:  Maximo Livingston is a 44 y.o. year old female who presents for follow-up evaluation of right foot injury.  DOI 2024.  She is here for CT review of foot. She was recently here for eval and notes her symptoms are generally unchanged and overall doing well.    Per last visit: Patient notes that overall pain has improved since her first visit, although it is still present when she attempts to weight-bear with her foot flat on the ground. She notes that at times she will walk with her foot inverted to avoid putting pressure throughout the foot.  She notes that swelling has improved significantly since her first visit.  She notes that she ambulates with a CAM boot when out of the house, but without at times while at home.  She notes that she works from home and has had no difficulty with her job.  She denies distal paresthesias.        PAST MEDICAL HISTORY:  Past Medical History:   Diagnosis Date    Anemia     Asthma     Meningitis     Migraines        PAST SURGICAL HISTORY:  Past Surgical History:   Procedure Laterality Date    DILATION AND CURETTAGE OF UTERUS      SURGICALLY INDUCED  BY D&C    ECTOPIC PREGNANCY  SURGERY Left     SALPINGECTOMY Left     RESOLVED 2010       FAMILY HISTORY:  Family History   Problem Relation Age of Onset    Breast cancer Mother     Diabetes Mother     Hypertension Mother     Osteoporosis Mother     Hypertension Father     Asthma Brother     Breast cancer Maternal Grandmother     Leukemia Maternal Grandmother     Hypertension Maternal Grandmother     Hypertension Paternal Grandmother     Heart disease Paternal Grandmother     Alzheimer's disease Paternal Grandmother     Dementia Paternal Grandmother     No Known Problems Paternal Grandfather     Breast cancer Maternal Aunt     Hypertension Family     Leukemia Daughter        SOCIAL HISTORY:  Social History     Tobacco Use    Smoking status: Never    Smokeless tobacco: Never   Vaping Use    Vaping status: Never Used   Substance Use Topics    Alcohol use: Yes     Comment: socially    Drug use: No       MEDICATIONS:    Current Outpatient Medications:     acetaminophen (TYLENOL) 500 mg tablet, Take 2 tablets (1,000 mg total) by mouth every 8 (eight) hours, Disp: 40 tablet, Rfl: 0    albuterol (2.5 mg/3 mL) 0.083 % nebulizer solution, every 6 (six) hours as needed , Disp: , Rfl:     albuterol (ProAir HFA) 90 mcg/act inhaler, Inhale 2 puffs every 6 (six) hours as needed for wheezing, Disp: 8.5 g, Rfl: 0    budesonide-formoterol (SYMBICORT) 160-4.5 mcg/act inhaler, Inhale 2 puffs, Disp: , Rfl:     cetirizine (ZYRTEC ALLERGY) 10 mg tablet, Take 10 mg by mouth daily, Disp: , Rfl:     Dupilumab 300 MG/2ML SOAJ, Inject under the skin, Disp: , Rfl:     ibuprofen (MOTRIN) 600 mg tablet, Take 1 tablet (600 mg total) by mouth every 6 (six) hours as needed for mild pain, Disp: 30 tablet, Rfl: 0    ipratropium (ATROVENT) 0.02 % nebulizer solution, Take 1 vial (0.5 mg total) by nebulization 4 (four) times a day, Disp: 20 vial, Rfl: 0    montelukast (SINGULAIR) 10 mg tablet, Take 10 mg by mouth daily at bedtime, Disp: , Rfl:     phentermine 37.5 MG capsule, Take  37.5 mg by mouth, Disp: , Rfl:     cyclobenzaprine (FLEXERIL) 10 mg tablet, Take 1 tablet (10 mg total) by mouth 3 (three) times a day as needed for muscle spasms (Patient not taking: Reported on 1/7/2025), Disp: 20 tablet, Rfl: 0    ibuprofen (MOTRIN) 800 mg tablet, Take 1 tablet (800 mg total) by mouth 3 (three) times a day (Patient not taking: Reported on 1/7/2025), Disp: 21 tablet, Rfl: 0    methocarbamol (ROBAXIN) 500 mg tablet, Take 1 tablet (500 mg total) by mouth 2 (two) times a day (Patient not taking: Reported on 1/7/2025), Disp: 20 tablet, Rfl: 0    SUMAtriptan (IMITREX) 25 mg tablet, Take 2 tablets at onset of migraine and one tablet every 2 hours as needed, not more than 5 tablets in 24 hours (Patient not taking: Reported on 1/7/2025), Disp: , Rfl:     tiotropium (Spiriva Respimat) 1.25 MCG/ACT AERS inhaler, Inhale 2 puffs (Patient not taking: Reported on 1/7/2025), Disp: , Rfl:     topiramate (TOPAMAX) 50 MG tablet, Take 50 mg by mouth (Patient not taking: Reported on 1/7/2025), Disp: , Rfl:     ALLERGIES:  Allergies   Allergen Reactions    Cat Dander     Dust Mite Extract     Pollen Extract Nasal Congestion     Seasonal allergies trigger asthma    Shellfish-Derived Products - Food Allergy Anaphylaxis    Shellfish-Derived Products - Food Allergy        Review of systems:   Constitutional: Negative for fatigue, fever or loss of apetite.   HENT: Negative.    Respiratory: Negative for shortness of breath, dyspnea.    Cardiovascular: Negative for chest pain/tightness.   Gastrointestinal: Negative for abdominal pain, N/V.   Endocrine: Negative for cold/heat intolerance, unexplained weight loss/gain.   Genitourinary: Negative for flank pain, dysuria, hematuria.   Musculoskeletal: As in HPI   Skin: Negative for rash.    Neurological: Negative for numbness tingling  Psychiatric/Behavioral: Negative for agitation.  _____________________________________________________  PHYSICAL EXAMINATION:    unknown if  currently breastfeeding.    General: well developed and well nourished, alert, oriented times 3, and appears comfortable  HEENT: Benign, normocephalic, atraumatic  Cardiovascular: Regular rate    Pulmonary: No wheezing or stridor  Abdomen: Soft, Nontender  Skin: No masses, erythema, lacerations, fluctation, ulcerations  Neurovascular: as per [facility] exam below    MUSCULOSKELETAL EXAMINATION:    Right foot exam    No bruising or swelling.  Flatfoot deformity.  Mild tenderness to palpation over fifth metatarsal base  Passive dorsiflexion to 10 degrees  Passive plantarflexion to 35 degrees  Supple hindfoot motion  Sensation intact to light touch in saphenous, sural, superficial peroneal, deep peroneal, tibial  2+ DP pulse  _____________________________________________________  STUDIES REVIEWED:  Images personally reviewed by me today     CT of the right foot taken on 2/18/25 demonstrates no evidence of osseous bridging to 5th mt base fracture, but confirmation of zone 1 injury, without involvement of 4th-5th articulation.         Scribe Attestation      I,:   am acting as a scribe while in the presence of the attending physician.:       I,:   personally performed the services described in this documentation    as scribed in my presence.:

## 2025-03-26 DIAGNOSIS — S92.351A CLOSED FRACTURE OF BASE OF FIFTH METATARSAL BONE OF RIGHT FOOT: Primary | ICD-10-CM

## 2025-04-28 ENCOUNTER — OFFICE VISIT (OUTPATIENT)
Dept: OBGYN CLINIC | Facility: CLINIC | Age: 45
End: 2025-04-28
Payer: COMMERCIAL

## 2025-04-28 ENCOUNTER — APPOINTMENT (OUTPATIENT)
Dept: RADIOLOGY | Facility: CLINIC | Age: 45
End: 2025-04-28
Attending: STUDENT IN AN ORGANIZED HEALTH CARE EDUCATION/TRAINING PROGRAM
Payer: COMMERCIAL

## 2025-04-28 VITALS — HEIGHT: 62 IN | BODY MASS INDEX: 36.99 KG/M2 | RESPIRATION RATE: 18 BRPM | WEIGHT: 201 LBS

## 2025-04-28 DIAGNOSIS — S92.351A CLOSED FRACTURE OF BASE OF FIFTH METATARSAL BONE OF RIGHT FOOT: Primary | ICD-10-CM

## 2025-04-28 PROCEDURE — 73630 X-RAY EXAM OF FOOT: CPT

## 2025-04-28 PROCEDURE — 99213 OFFICE O/P EST LOW 20 MIN: CPT | Performed by: STUDENT IN AN ORGANIZED HEALTH CARE EDUCATION/TRAINING PROGRAM

## 2025-04-28 NOTE — PROGRESS NOTES
ASSESSMENT/PLAN:    Diagnoses and all orders for this visit:    Closed fracture of base of fifth metatarsal bone of right foot  -     Ambulatory Referral to Orthopedic Surgery    Discussed history, exam, and imaging with patient. Presentation continues to be consistent with Zone 1 R 5th MT Base Fx.  Continue progression to weightbearing and activity as tolerated with regular shoewear.  Discussed potential bony healing vs more likely fibrous union.  Discussed that patients can often times be symptomatic up to 6 months after the injury and continue to make improvements in her symptoms for a year after injury.   Discussed foot and ankle rehab program which may help to expedite her recovery.  Program was included in AVS today.  Discussed bone stimulator use.  She met with our DME representative to discuss possibly getting this ordered.  F/u 3 months for repeat eval, XR 3v R foot.   _____________________________________________________  CHIEF COMPLAINT:  Chief Complaint   Patient presents with    Right Foot - Follow-up, Numbness, Tingling, Pain     Patient c/o pain score 8 when putting pressure , no pain when sitting and not active       SUBJECTIVE:  Maximo Livingston is a 44 y.o. year old female who presents for follow-up evaluation of right foot injury.  DOI 12/29/2024.  She notes that she has been doing better, but she is still symptomatic to her right foot and has been limiting her lifestyle.  She notes that she is very active with her kids and it has been difficult to stand for long periods of time as she has increased pain as well as some swelling.  She notes that she effectively had to stay in bed for an entire day prior to her son's football game, as she would have been unable to stand for any prolonged period of time as she recently been using her foot.  She continues to get some swelling after prolonged activity.  Her pain is localized to the lateral aspect of her midfoot.    Per last visit: Patient notes that  overall pain has improved since her first visit, although it is still present when she attempts to weight-bear with her foot flat on the ground. She notes that at times she will walk with her foot inverted to avoid putting pressure throughout the foot.  She notes that swelling has improved significantly since her first visit.  She notes that she ambulates with a CAM boot when out of the house, but without at times while at home.  She notes that she works from home and has had no difficulty with her job.  She denies distal paresthesias.        PAST MEDICAL HISTORY:  Past Medical History:   Diagnosis Date    Anemia     Asthma     Meningitis     Migraines        PAST SURGICAL HISTORY:  Past Surgical History:   Procedure Laterality Date    DILATION AND CURETTAGE OF UTERUS      SURGICALLY INDUCED  BY [facility]&C    ECTOPIC PREGNANCY SURGERY Left     SALPINGECTOMY Left     RESOLVED        FAMILY HISTORY:  Family History   Problem Relation Age of Onset    Breast cancer Mother     Diabetes Mother     Hypertension Mother     Osteoporosis Mother     Hypertension Father     Asthma Brother     Breast cancer Maternal Grandmother     Leukemia Maternal Grandmother     Hypertension Maternal Grandmother     Hypertension Paternal Grandmother     Heart disease Paternal Grandmother     Alzheimer's disease Paternal Grandmother     Dementia Paternal Grandmother     No Known Problems Paternal Grandfather     Breast cancer Maternal Aunt     Hypertension Family     Leukemia Daughter        SOCIAL HISTORY:  Social History     Tobacco Use    Smoking status: Never    Smokeless tobacco: Never   Vaping Use    Vaping status: Never Used   Substance Use Topics    Alcohol use: Yes     Comment: socially    Drug use: No       MEDICATIONS:    Current Outpatient Medications:     acetaminophen (TYLENOL) 500 mg tablet, Take 2 tablets (1,000 mg total) by mouth every 8 (eight) hours, Disp: 40 tablet, Rfl: 0    albuterol (2.5 mg/3 mL) 0.083 % nebulizer  solution, every 6 (six) hours as needed , Disp: , Rfl:     albuterol (ProAir HFA) 90 mcg/act inhaler, Inhale 2 puffs every 6 (six) hours as needed for wheezing, Disp: 8.5 g, Rfl: 0    budesonide-formoterol (SYMBICORT) 160-4.5 mcg/act inhaler, Inhale 2 puffs, Disp: , Rfl:     cetirizine (ZYRTEC ALLERGY) 10 mg tablet, Take 10 mg by mouth daily, Disp: , Rfl:     Dupilumab 300 MG/2ML SOAJ, Inject under the skin, Disp: , Rfl:     ibuprofen (MOTRIN) 600 mg tablet, Take 1 tablet (600 mg total) by mouth every 6 (six) hours as needed for mild pain, Disp: 30 tablet, Rfl: 0    ipratropium (ATROVENT) 0.02 % nebulizer solution, Take 1 vial (0.5 mg total) by nebulization 4 (four) times a day, Disp: 20 vial, Rfl: 0    montelukast (SINGULAIR) 10 mg tablet, Take 10 mg by mouth daily at bedtime, Disp: , Rfl:     phentermine 37.5 MG capsule, Take 37.5 mg by mouth, Disp: , Rfl:     cyclobenzaprine (FLEXERIL) 10 mg tablet, Take 1 tablet (10 mg total) by mouth 3 (three) times a day as needed for muscle spasms (Patient not taking: Reported on 1/7/2025), Disp: 20 tablet, Rfl: 0    ibuprofen (MOTRIN) 800 mg tablet, Take 1 tablet (800 mg total) by mouth 3 (three) times a day, Disp: 21 tablet, Rfl: 0    methocarbamol (ROBAXIN) 500 mg tablet, Take 1 tablet (500 mg total) by mouth 2 (two) times a day (Patient not taking: Reported on 1/7/2025), Disp: 20 tablet, Rfl: 0    SUMAtriptan (IMITREX) 25 mg tablet, Take 2 tablets at onset of migraine and one tablet every 2 hours as needed, not more than 5 tablets in 24 hours (Patient not taking: Reported on 1/7/2025), Disp: , Rfl:     tiotropium (Spiriva Respimat) 1.25 MCG/ACT AERS inhaler, Inhale 2 puffs (Patient not taking: Reported on 1/7/2025), Disp: , Rfl:     topiramate (TOPAMAX) 50 MG tablet, Take 50 mg by mouth (Patient not taking: Reported on 1/7/2025), Disp: , Rfl:     ALLERGIES:  Allergies   Allergen Reactions    Cat Dander Other (See Comments)    Dust Mite Extract Other (See Comments)     "Pollen Extract Nasal Congestion     Seasonal allergies trigger asthma    Shellfish-Derived Products - Food Allergy Anaphylaxis    Shellfish-Derived Products - Food Allergy        Review of systems:   Constitutional: Negative for fatigue, fever or loss of apetite.   HENT: Negative.    Respiratory: Negative for shortness of breath, dyspnea.    Cardiovascular: Negative for chest pain/tightness.   Gastrointestinal: Negative for abdominal pain, N/V.   Endocrine: Negative for cold/heat intolerance, unexplained weight loss/gain.   Genitourinary: Negative for flank pain, dysuria, hematuria.   Musculoskeletal: As in HPI   Skin: Negative for rash.    Neurological: Negative for numbness tingling  Psychiatric/Behavioral: Negative for agitation.  _____________________________________________________  PHYSICAL EXAMINATION:    Resp. rate 18, height 5' 2\" (1.575 m), weight 91.2 kg (201 lb), unknown if currently breastfeeding.    General: well developed and well nourished, alert, oriented times 3, and appears comfortable  HEENT: Benign, normocephalic, atraumatic  Cardiovascular: Regular rate    Pulmonary: No wheezing or stridor  Abdomen: Soft, Nontender  Skin: No masses, erythema, lacerations, fluctation, ulcerations  Neurovascular: as per [facility] exam below    MUSCULOSKELETAL EXAMINATION:    Right foot exam    No bruising or swelling.  Mild flatfoot  Mild tenderness to palpation over fifth metatarsal base  Passive dorsiflexion to 10 degrees  Passive plantarflexion to 35 degrees  Supple hindfoot motion  Sensation intact to light touch in saphenous, sural, superficial peroneal, deep peroneal, tibial  2+ DP pulse  _____________________________________________________  STUDIES REVIEWED:  Images personally reviewed by me today     X-ray of the right foot taken today April 28, 2025 demonstrate AP oblique and lateral of the right foot showing fracture to fifth metatarsal base with some increased consolidation compared to prior x-ray.  Still " present fracture line to the lateral margin of the fifth metatarsal base.

## 2025-05-28 ENCOUNTER — TELEPHONE (OUTPATIENT)
Dept: OBGYN CLINIC | Facility: HOSPITAL | Age: 45
End: 2025-05-28

## 2025-05-28 ENCOUNTER — TELEPHONE (OUTPATIENT)
Age: 45
End: 2025-05-28

## 2025-05-28 DIAGNOSIS — S92.351A CLOSED FRACTURE OF BASE OF FIFTH METATARSAL BONE OF RIGHT FOOT: Primary | ICD-10-CM

## 2025-05-28 NOTE — TELEPHONE ENCOUNTER
Caller: Patient     Doctor: Dr. Trevino     Reason for call: Patient is asking for instructions for her Osteogenesic Stimulator order script and she is also asking for a PT order script please contact patient     Call back#: [phone]

## 2025-05-28 NOTE — TELEPHONE ENCOUNTER
Caller: [employer]    Doctor/Office: Dr garnica    CB#: [phone]      What needs to be faxed: ONN 2/11, 2/24, 4/28    ATTN to: Roque    Fax#: [phone]      Documents were successfully e-faxed

## 2025-06-25 ENCOUNTER — EVALUATION (OUTPATIENT)
Dept: PHYSICAL THERAPY | Facility: CLINIC | Age: 45
End: 2025-06-25
Payer: COMMERCIAL

## 2025-06-25 DIAGNOSIS — S92.351A CLOSED FRACTURE OF BASE OF FIFTH METATARSAL BONE OF RIGHT FOOT: Primary | ICD-10-CM

## 2025-06-25 PROCEDURE — 97110 THERAPEUTIC EXERCISES: CPT

## 2025-06-25 PROCEDURE — 97112 NEUROMUSCULAR REEDUCATION: CPT

## 2025-06-25 PROCEDURE — 97161 PT EVAL LOW COMPLEX 20 MIN: CPT

## 2025-06-25 NOTE — PROGRESS NOTES
PT Evaluation     Today's date: 2025  Patient name: Maximo Livingston  : 1980  MRN: [id number]  Referring provider: Roland Trevino MD  Dx:   Encounter Diagnosis     ICD-10-CM    1. Closed fracture of base of fifth metatarsal bone of right foot  S92.351A Ambulatory Referral to Physical Therapy          Start Time: 0930  Stop Time: 1015  Total time in clinic (min): 45 minutes    Assessment  Impairments: abnormal gait, abnormal muscle tone, abnormal or restricted ROM, abnormal movement, activity intolerance, impaired physical strength, lacks appropriate home exercise program, pain with function and weight-bearing intolerance    Assessment details: Patient is a 45 y/o female reporting to physical therapy with R lateral foot pain s/p base of 5th metatarsal fx. Upon examination, patient demonstrates impairments of increased pain, decreased ROM, decreased strength, and antalgic gait pattern which are resulting in functional limitations of her performance of ADL's/self-care and household chores. PT plan of care will focus on pain reduction, ROM, and strengthening to improve her impairments to be able to return to her prior level of function. Patient is a good candidate for and would benefit from skilled physical therapy to improve above listed impairments to facilitate a return to her prior level of function.     Understanding of Dx/Px/POC: good     Prognosis: good    Goals  ST weeks  1. Patient's subjective reported pain levels at worst will decrease by at least 2 levels.  2. Patient will become independent with her HEP.     LT weeks  1. Patient's R foot and ankle strength will improve to WFL for improved tolerance to ambulation and performance of daily activities.   2. Patient's FOTO score will improve from a 29 upon initial evaluation to a 58 or better indicating improvements in her performance of functional activities.     Plan  Patient would benefit from: skilled physical therapy and PT  eval  Planned modality interventions: cryotherapy, low level laser therapy, thermotherapy: hydrocollator packs and unattended electrical stimulation    Planned therapy interventions: IASTM, joint mobilization, kinesiology taping, manual therapy, nerve gliding, neuromuscular re-education, patient/caregiver education, postural training, strengthening, stretching, therapeutic activities, therapeutic exercise, home exercise program, functional ROM exercises, flexibility, balance/weight bearing training and activity modification    Frequency: 2x week  Duration in weeks: 8  Plan of Care beginning date: 6/25/2025  Plan of Care expiration date: 8/20/2025  Treatment plan discussed with: patient      Subjective Evaluation    History of Present Illness  Mechanism of injury: Pt is a 45 y/o female presenting to PT with complaints of R foot pain s/p R base of 5th metatarsal fx which occurred on 12/29/24. She reports she slipped on wet stairs outside when her foot buckled which resulted in her fx. She reports since then she has had continual pain in the bottom of her R foot and has now been radiating along the lateral border of her foot and up into her ankle. She also reports she feels decreased sensation near her fx site. Prior to her injury, she was very active with walking and being on her feet or long periods with her children participating in sports. She notes she can currently only tolerate 10-15 minutes on her feet. She reports when walking she feels that she is walking on the inside of her R foot and walks on her R heel at times to not put pressure on her fx site. She reports she has only been able to wear Crocs. She states using heat, icy hot, and a massager for pain relief, but they do not provide her with lasting relief. She notes she has also tried to obtain a bone stimulator for healing of her fx, but it was denied by her insurance. Pt states her goals for PT are to be able to walk properly and return to wearing shoes  "other than crocs without pain.   Quality of life: good    Pain  Current pain rating: 3  At best pain ratin  At worst pain ratin  Quality: throbbing and sharp  Relieving factors: heat  Aggravating factors: walking and lifting          Objective     Static Posture     Ankle/Foot   Ankle/Foot (Left): Pes planus.   Ankle/Foot (Right): Pes planus.     Tenderness     Right Ankle/Foot   Tenderness in the fifth metatarsal base.     Active Range of Motion   Left Ankle/Foot   Dorsiflexion (ke): 25 degrees   Plantar flexion: 20 degrees   Inversion: 25 degrees   Eversion: 25 degrees     Right Ankle/Foot   Dorsiflexion (ke): 15 degrees with pain  Plantar flexion: 8 degrees with pain  Inversion: 10 degrees with pain  Eversion: 5 degrees with pain    Strength/Myotome Testing     Left Ankle/Foot   Dorsiflexion: 5  Plantar flexion: 5  Inversion: 5  Eversion: 5    Right Ankle/Foot   Dorsiflexion: 4  Plantar flexion: 4-  Inversion: 4  Eversion: 3+    Ambulation     Observational Gait   Gait: antalgic   Decreased walking speed and stride length.     Additional Observational Gait Details  Heel to forefoot pattern noted with R foot during gait cycle with decreased push off of R foot.     Quality of Movement During Gait     Foot Alignment    Foot Alignment (Left): Positive flat foot.   Foot Alignment (Right): Positive flat foot.              Precautions: Hx 5th met fx 2924, asthma, chronic pain, lumbar DJD    POC expires Unit limit Auth Expiration date PT/OT + Visit Limit?    N/A Pend  N/A                 Visit/Unit Tracking  AUTH Status:  Date               Pend Used 1               Remaining                    FOTO:       [employer] Access Code: [email or web address]    Manuals             R ankle PROM TB                                                   Neuro Re-Ed             Pt education regarding HEP, POC, and dx  5'             Ankle circles  3x10            Short foot X15 5\"                                                 "                 Ther Ex             Ankle pumps  3x10                                                                                                        Ther Activity                                       Gait Training                                       Modalities

## 2025-06-30 ENCOUNTER — OFFICE VISIT (OUTPATIENT)
Dept: OBGYN CLINIC | Facility: CLINIC | Age: 45
End: 2025-06-30
Payer: COMMERCIAL

## 2025-06-30 ENCOUNTER — APPOINTMENT (OUTPATIENT)
Dept: RADIOLOGY | Facility: CLINIC | Age: 45
End: 2025-06-30
Attending: STUDENT IN AN ORGANIZED HEALTH CARE EDUCATION/TRAINING PROGRAM
Payer: COMMERCIAL

## 2025-06-30 VITALS — HEIGHT: 62 IN | BODY MASS INDEX: 36.62 KG/M2 | WEIGHT: 199 LBS | RESPIRATION RATE: 18 BRPM

## 2025-06-30 DIAGNOSIS — S92.351A CLOSED FRACTURE OF BASE OF FIFTH METATARSAL BONE OF RIGHT FOOT: Primary | ICD-10-CM

## 2025-06-30 DIAGNOSIS — S92.351A CLOSED FRACTURE OF BASE OF FIFTH METATARSAL BONE OF RIGHT FOOT: ICD-10-CM

## 2025-06-30 PROCEDURE — 73630 X-RAY EXAM OF FOOT: CPT

## 2025-06-30 PROCEDURE — 99213 OFFICE O/P EST LOW 20 MIN: CPT | Performed by: STUDENT IN AN ORGANIZED HEALTH CARE EDUCATION/TRAINING PROGRAM

## 2025-06-30 NOTE — ASSESSMENT & PLAN NOTE
Follow-up for closed fracture base of fifth metatarsal right foot, with continued healing demonstrated on x-ray  Activity recommendations: Continue weightbearing and activity as tolerated in regular shoewear.  Physical Therapy: Continue formal physical therapy and home exercise program.  Imaging recommendations: Repeat x-rays reviewed at today's visit.  Medication recommendations: Continue use of over-the-counter oral analgesics as needed.  Patient notes that she spoke with our DME representative last week, and bone stimulator was ordered and is scheduled to ship this week.

## 2025-06-30 NOTE — PROGRESS NOTES
"[facility] Follow-up Note    Patient Name:  Maximo Livingston  MRN:  [id number]  Date of last visit: 4/28/25     Assessment/Plan:     Assessment & Plan  Closed fracture of base of fifth metatarsal bone of right foot  Follow-up for closed fracture base of fifth metatarsal right foot, with continued healing demonstrated on x-ray  Activity recommendations: Continue weightbearing and activity as tolerated in regular shoewear.  Physical Therapy: Continue formal physical therapy and home exercise program.  Imaging recommendations: Repeat x-rays reviewed at today's visit.  Medication recommendations: Continue use of over-the-counter oral analgesics as needed.  Patient notes that she spoke with our DME representative last week, and bone stimulator was ordered and is scheduled to ship this week.      Return in 2 months (on 8/30/2025).      Subjective   Maximo Livingston returns for follow-up of closed fracture base of right fifth metatarsal, approximately 2 month(s) since last visit. At that time, in brief the plan was for: Continued nonoperative management with oral medication regimen, weightbearing as tolerated, physical therapy, and bone stimulator.      Currently patient presents noting: She notes that she continues to have pain in the right foot, with minimal improvement since her last visit.  She notes that wearing regular sneakers has been uncomfortable as it presses too much on her lateral foot.  She has mainly been ambulating in crocs or more comfortable shoes, but notes that she is slightly altering her gait, and putting more pressure on the inside of her foot as this helps with pain.      Objective     Resp 18   Ht 5' 2\" (1.575 m)   Wt 90.3 kg (199 lb)   BMI 36.40 kg/m²     Right foot exam     No bruising or swelling.  Mild flatfoot   Mild tenderness to palpation over fifth metatarsal base  Passive dorsiflexion to 10 degrees  Passive plantarflexion to 35 degrees  Supple hindfoot " motion  Sensation intact to light touch in saphenous, sural, superficial peroneal, deep peroneal, tibial  2+ DP pulse    Data Review     I have personally reviewed pertinent films in PACS:    Xrays of the right foot taken on 6/30/25 demonstrate fracture of the fifth metatarsal base with continued consolidation compared to prior x-ray.     Pertinent PMH/Meds/Allergies reviewed as listed below:  Past Medical History[1] Current Medications[2] Allergies[3]     Scribe Attestation      I,:  Kiko Al PA-C am acting as a scribe while in the presence of the attending physician.:       I,:  Roland Trevino MD personally performed the services described in this documentation    as scribed in my presence.:                  [1]   Past Medical History:  Diagnosis Date    Anemia     Asthma     Meningitis     Migraines    [2]   Current Outpatient Medications:     acetaminophen (TYLENOL) 500 mg tablet, Take 2 tablets (1,000 mg total) by mouth every 8 (eight) hours, Disp: 40 tablet, Rfl: 0    albuterol (ProAir HFA) 90 mcg/act inhaler, Inhale 2 puffs every 6 (six) hours as needed for wheezing, Disp: 8.5 g, Rfl: 0    budesonide-formoterol (SYMBICORT) 160-4.5 mcg/act inhaler, Inhale 2 puffs, Disp: , Rfl:     Dupilumab 300 MG/2ML SOAJ, Inject under the skin, Disp: , Rfl:     ipratropium (ATROVENT) 0.02 % nebulizer solution, Take 1 vial (0.5 mg total) by nebulization 4 (four) times a day, Disp: 20 vial, Rfl: 0    montelukast (SINGULAIR) 10 mg tablet, Take 10 mg by mouth daily at bedtime, Disp: , Rfl:     phentermine 37.5 MG capsule, Take 37.5 mg by mouth, Disp: , Rfl:     albuterol (2.5 mg/3 mL) 0.083 % nebulizer solution, every 6 (six) hours as needed , Disp: , Rfl:     cetirizine (ZYRTEC ALLERGY) 10 mg tablet, Take 10 mg by mouth daily, Disp: , Rfl:     cyclobenzaprine (FLEXERIL) 10 mg tablet, Take 1 tablet (10 mg total) by mouth 3 (three) times a day as needed for muscle spasms (Patient not taking: Reported on 1/7/2025), Disp: 20  tablet, Rfl: 0    ibuprofen (MOTRIN) 600 mg tablet, Take 1 tablet (600 mg total) by mouth every 6 (six) hours as needed for mild pain, Disp: 30 tablet, Rfl: 0    ibuprofen (MOTRIN) 800 mg tablet, Take 1 tablet (800 mg total) by mouth 3 (three) times a day, Disp: 21 tablet, Rfl: 0    methocarbamol (ROBAXIN) 500 mg tablet, Take 1 tablet (500 mg total) by mouth 2 (two) times a day (Patient not taking: Reported on 1/7/2025), Disp: 20 tablet, Rfl: 0    SUMAtriptan (IMITREX) 25 mg tablet, Take 2 tablets at onset of migraine and one tablet every 2 hours as needed, not more than 5 tablets in 24 hours (Patient not taking: Reported on 1/7/2025), Disp: , Rfl:     tiotropium (Spiriva Respimat) 1.25 MCG/ACT AERS inhaler, Inhale 2 puffs (Patient not taking: Reported on 1/7/2025), Disp: , Rfl:     topiramate (TOPAMAX) 50 MG tablet, Take 50 mg by mouth (Patient not taking: Reported on 1/7/2025), Disp: , Rfl:   [3]   Allergies  Allergen Reactions    Cat Dander Other (See Comments)    Dust Mite Extract Other (See Comments)    Pollen Extract Nasal Congestion     Seasonal allergies trigger asthma    Shellfish-[employer] Products - Food Allergy Anaphylaxis    Shellfish-Derived Products - Food Allergy

## 2025-07-01 ENCOUNTER — OFFICE VISIT (OUTPATIENT)
Dept: PHYSICAL THERAPY | Facility: CLINIC | Age: 45
End: 2025-07-01
Attending: STUDENT IN AN ORGANIZED HEALTH CARE EDUCATION/TRAINING PROGRAM
Payer: COMMERCIAL

## 2025-07-01 DIAGNOSIS — S92.351A CLOSED FRACTURE OF BASE OF FIFTH METATARSAL BONE OF RIGHT FOOT: Primary | ICD-10-CM

## 2025-07-01 PROCEDURE — 97140 MANUAL THERAPY 1/> REGIONS: CPT

## 2025-07-01 PROCEDURE — 97110 THERAPEUTIC EXERCISES: CPT

## 2025-07-01 PROCEDURE — 97112 NEUROMUSCULAR REEDUCATION: CPT

## 2025-07-01 NOTE — PROGRESS NOTES
Daily Note     Today's date: 2025  Patient name: Maximo Livingston  : 1980  MRN: [id number]  Referring provider: Roland Trevino MD  Dx:   Encounter Diagnosis     ICD-10-CM    1. Closed fracture of base of fifth metatarsal bone of right foot  S92.351A           Start Time: 0800  Stop Time: 0845  Total time in clinic (min): 45 minutes    Subjective: Patient reports she had some discomfort after her first session. Notes her orthopedic appointment went well and she is supposed to finally get her bone stimulator today. She is wearing [employer] sneakers this session and notes improved lateral foot pain, but still has pain on the plantar aspect of her foot near her fx site.       Objective: See treatment diary below      Assessment: Tolerated treatment well. Patient demonstrated fatigue post treatment, exhibited good technique with therapeutic exercises, and would benefit from continued PT. Continues to provide good effort during performance of exercises. Noted discomfort with counterclockwise ankle circles with pain traveling distally to the head of her 5th metatarsal. Added inversion and eversion AROM, seated toe raises, and towel scrunches to which patient noted slight discomfort, but responded well. Visual and verbal cues provided to ensure correct form during performance of new exercises. Added KT tape at base of 5th met this session. Assess response to tape at next session. Will continue to assess patient tolerance and progress next visit, as able.         Plan: Continue per plan of care.      Precautions: Hx 5th met fx 2924, asthma, chronic pain, lumbar DJD    POC expires Unit limit Auth Expiration date PT/OT + Visit Limit?    N/A   N/A                 Visit/Unit Tracking  AUTH Status:  Date              15 visits Used 1 2              Remaining  14 13                 FOTO:       [employer] Access Code: [email or web address]    Manuals            R ankle PROM TB TB           KT tape R base of  "5th met  TB                                     Neuro Re-Ed             Pt education regarding HEP, POC, and dx  5'  5'            Ankle circles  3x10 3x10            Short foot X15 5\"  X20 5\"            Towel scrunch   2'            BAPS board                                        Ther Ex             Ankle pumps  3x10  3x10           Ankle inv/eversion AROM   3x10            Seated toe raise   2x10                                                                            Ther Activity                                       Gait Training                                       Modalities                                            "

## 2025-07-09 ENCOUNTER — OFFICE VISIT (OUTPATIENT)
Dept: PHYSICAL THERAPY | Facility: CLINIC | Age: 45
End: 2025-07-09
Attending: STUDENT IN AN ORGANIZED HEALTH CARE EDUCATION/TRAINING PROGRAM
Payer: COMMERCIAL

## 2025-07-09 DIAGNOSIS — S92.351A CLOSED FRACTURE OF BASE OF FIFTH METATARSAL BONE OF RIGHT FOOT: Primary | ICD-10-CM

## 2025-07-09 PROCEDURE — 97112 NEUROMUSCULAR REEDUCATION: CPT

## 2025-07-09 PROCEDURE — 97140 MANUAL THERAPY 1/> REGIONS: CPT

## 2025-07-09 PROCEDURE — 97110 THERAPEUTIC EXERCISES: CPT

## 2025-07-09 NOTE — PROGRESS NOTES
"Daily Note     Today's date: 2025  Patient name: Maximo Livingston  : 1980  MRN: [id number]  Referring provider: Roland Trevino MD  Dx:   Encounter Diagnosis     ICD-10-CM    1. Closed fracture of base of fifth metatarsal bone of right foot  S92.351A           Start Time: 1230  Stop Time: 1315  Total time in clinic (min): 45 minutes    Subjective: Patient reports feeling a little better with the pulling sensation in her R foot. Notes has been using bone stimulator 2x/day.       Objective: See treatment diary below      Assessment: Tolerated treatment well. Patient demonstrated fatigue post treatment, exhibited good technique with therapeutic exercises, and would benefit from continued PT. Continues to provide good effort during performance of exercises. Minimal verbal cues needed to correct patient form during performance of exercises. Will continue to assess patient tolerance and progress next visit, as able.         Plan: Continue per plan of care.      Precautions: Hx 5th met fx 2924, asthma, chronic pain, lumbar DJD    POC expires Unit limit Auth Expiration date PT/OT + Visit Limit?    N/A   N/A                 Visit/Unit Tracking  AUTH Status:  Date             15 visits Used 1 2 3             Remaining  14 13 12                FOTO:       [email or web address] Access Code: [email or web address]    Manuals           R ankle PROM TB TB TB          KT tape R base of 5th met  TB TB                                    Neuro Re-Ed             Pt education regarding HEP, POC, and dx  5'  5'  5'           Ankle circles  3x10 3x10  3x10           Short foot X15 5\"  X20 5\"  X20 5\"           Towel scrunch   2'  2'           BAPS board                                        Ther Ex             Ankle pumps  3x10  3x10 3x10           Ankle inv/eversion AROM   3x10  3x10           Seated toe raise   2x10 2x10                                                                            Ther Activity      "                                  Gait Training                                       Modalities

## 2025-07-17 ENCOUNTER — OFFICE VISIT (OUTPATIENT)
Dept: PHYSICAL THERAPY | Facility: CLINIC | Age: 45
End: 2025-07-17
Attending: STUDENT IN AN ORGANIZED HEALTH CARE EDUCATION/TRAINING PROGRAM
Payer: COMMERCIAL

## 2025-07-17 DIAGNOSIS — S92.351A CLOSED FRACTURE OF BASE OF FIFTH METATARSAL BONE OF RIGHT FOOT: Primary | ICD-10-CM

## 2025-07-17 PROCEDURE — 97110 THERAPEUTIC EXERCISES: CPT

## 2025-07-17 PROCEDURE — 97112 NEUROMUSCULAR REEDUCATION: CPT

## 2025-07-17 NOTE — PROGRESS NOTES
"Daily Note     Today's date: 2025  Patient name: Maximo Livingston  : 1980  MRN: [id number]  Referring provider: Roland Trevino MD  Dx:   Encounter Diagnosis     ICD-10-CM    1. Closed fracture of base of fifth metatarsal bone of right foot  S92.351A           Start Time: 1800  Stop Time: 1846  Total time in clinic (min): 46 minutes    Subjective: Patient reports she has been able to ambulate better, but still continues with some pain.       Objective: See treatment diary below      Assessment: Tolerated treatment well. Patient demonstrated fatigue post treatment, exhibited good technique with therapeutic exercises, and would benefit from continued PT. Continues to provide good effort during performance of exercises. Added BAPS board this session to which patient noted increased challenge, but responded well. Visual and verbal cues provided to ensure correct form during performance of new exercises. Will continue to assess patient tolerance and progress next visit, as able.         Plan: Continue per plan of care.      Precautions: Hx 5th met fx 2924, asthma, chronic pain, lumbar DJD    POC expires Unit limit Auth Expiration date PT/OT + Visit Limit?    N/A   N/A                 Visit/Unit Tracking  AUTH Status:  Date            15 visits Used 1 2 3 4            Remaining  14 13 12 11               FOTO:       [employer] Access Code: [email or web address]    Manuals          R ankle PROM TB TB TB TB         KT tape R base of 5th met  TB TB TB                                   Neuro Re-Ed             Pt education regarding HEP, POC, and dx  5'  5'  5'  5'          Ankle circles  3x10 3x10  3x10  3x10          Short foot X15 5\"  X20 5\"  X20 5\"  X20 5\"          Towel scrunch   2'  2'  3'         BAPS board     2x10 ea DF/PF and Inv/eversion                                   Ther Ex             Ankle pumps  3x10  3x10 3x10  3x10         Ankle inv/eversion AROM   3x10  3x10  " 3x10          Seated toe raise   2x10 2x10  2x10                                                                          Ther Activity                                       Gait Training                                       Modalities